# Patient Record
Sex: FEMALE | Race: WHITE | NOT HISPANIC OR LATINO | Employment: OTHER | URBAN - METROPOLITAN AREA
[De-identification: names, ages, dates, MRNs, and addresses within clinical notes are randomized per-mention and may not be internally consistent; named-entity substitution may affect disease eponyms.]

---

## 2017-08-07 ENCOUNTER — TELEPHONE (OUTPATIENT)
Dept: INTERNAL MEDICINE | Facility: CLINIC | Age: 77
End: 2017-08-07

## 2017-08-07 ENCOUNTER — OFFICE VISIT (OUTPATIENT)
Dept: INTERNAL MEDICINE | Facility: CLINIC | Age: 77
End: 2017-08-07
Payer: MEDICARE

## 2017-08-07 VITALS
OXYGEN SATURATION: 97 % | HEIGHT: 65 IN | DIASTOLIC BLOOD PRESSURE: 72 MMHG | BODY MASS INDEX: 22.66 KG/M2 | TEMPERATURE: 97 F | SYSTOLIC BLOOD PRESSURE: 118 MMHG | HEART RATE: 62 BPM | WEIGHT: 136 LBS

## 2017-08-07 DIAGNOSIS — B00.9 HSV-2 INFECTION: ICD-10-CM

## 2017-08-07 DIAGNOSIS — E78.5 HYPERLIPIDEMIA, UNSPECIFIED HYPERLIPIDEMIA TYPE: ICD-10-CM

## 2017-08-07 DIAGNOSIS — K21.9 GASTROESOPHAGEAL REFLUX DISEASE, ESOPHAGITIS PRESENCE NOT SPECIFIED: ICD-10-CM

## 2017-08-07 DIAGNOSIS — Z12.31 ENCOUNTER FOR SCREENING MAMMOGRAM FOR MALIGNANT NEOPLASM OF BREAST: ICD-10-CM

## 2017-08-07 DIAGNOSIS — N32.81 OVERACTIVE BLADDER: Primary | ICD-10-CM

## 2017-08-07 DIAGNOSIS — F41.9 ANXIETY: ICD-10-CM

## 2017-08-07 DIAGNOSIS — M85.80 OSTEOPENIA, UNSPECIFIED LOCATION: ICD-10-CM

## 2017-08-07 DIAGNOSIS — I10 ESSENTIAL HYPERTENSION: ICD-10-CM

## 2017-08-07 DIAGNOSIS — M89.9 BONE DISORDER: ICD-10-CM

## 2017-08-07 DIAGNOSIS — Z91.09 ENVIRONMENTAL ALLERGIES: ICD-10-CM

## 2017-08-07 PROBLEM — F32.A DEPRESSION: Status: ACTIVE | Noted: 2017-08-07

## 2017-08-07 PROCEDURE — 1126F AMNT PAIN NOTED NONE PRSNT: CPT | Mod: ,,, | Performed by: INTERNAL MEDICINE

## 2017-08-07 PROCEDURE — 99204 OFFICE O/P NEW MOD 45 MIN: CPT | Mod: S$PBB,,, | Performed by: INTERNAL MEDICINE

## 2017-08-07 PROCEDURE — 99203 OFFICE O/P NEW LOW 30 MIN: CPT | Mod: PBBFAC,PO | Performed by: INTERNAL MEDICINE

## 2017-08-07 PROCEDURE — 99999 PR PBB SHADOW E&M-NEW PATIENT-LVL III: CPT | Mod: PBBFAC,,, | Performed by: INTERNAL MEDICINE

## 2017-08-07 PROCEDURE — 3008F BODY MASS INDEX DOCD: CPT | Mod: ,,, | Performed by: INTERNAL MEDICINE

## 2017-08-07 PROCEDURE — 1159F MED LIST DOCD IN RCRD: CPT | Mod: ,,, | Performed by: INTERNAL MEDICINE

## 2017-08-07 RX ORDER — TRAZODONE HYDROCHLORIDE 50 MG/1
50 TABLET ORAL NIGHTLY PRN
Qty: 90 TABLET | Refills: 0 | Status: SHIPPED | OUTPATIENT
Start: 2017-08-07 | End: 2017-12-09 | Stop reason: SDUPTHER

## 2017-08-07 RX ORDER — RAMIPRIL 5 MG/1
5 CAPSULE ORAL DAILY
Qty: 90 CAPSULE | Refills: 1 | Status: SHIPPED | OUTPATIENT
Start: 2017-08-07 | End: 2018-04-18 | Stop reason: SDUPTHER

## 2017-08-07 RX ORDER — LORAZEPAM 1 MG/1
1 TABLET ORAL DAILY
COMMUNITY
Start: 2017-07-31 | End: 2017-09-01

## 2017-08-07 RX ORDER — ESCITALOPRAM OXALATE 20 MG/1
20 TABLET ORAL DAILY
Qty: 90 TABLET | Refills: 1 | Status: SHIPPED | OUTPATIENT
Start: 2017-08-07 | End: 2018-02-20 | Stop reason: SDUPTHER

## 2017-08-07 RX ORDER — OXYBUTYNIN CHLORIDE 5 MG/1
5 TABLET, EXTENDED RELEASE ORAL DAILY
COMMUNITY
Start: 2017-07-17 | End: 2017-08-07 | Stop reason: SDUPTHER

## 2017-08-07 RX ORDER — ESCITALOPRAM OXALATE 20 MG/1
1 TABLET ORAL DAILY
COMMUNITY
Start: 2017-07-21 | End: 2017-08-07 | Stop reason: SDUPTHER

## 2017-08-07 RX ORDER — ATORVASTATIN CALCIUM 10 MG/1
10 TABLET, FILM COATED ORAL DAILY
Qty: 90 TABLET | Refills: 1 | Status: SHIPPED | OUTPATIENT
Start: 2017-08-07 | End: 2018-02-08 | Stop reason: SDUPTHER

## 2017-08-07 RX ORDER — OXYBUTYNIN CHLORIDE 5 MG/1
5 TABLET, EXTENDED RELEASE ORAL DAILY
Qty: 90 TABLET | Refills: 1 | Status: SHIPPED | OUTPATIENT
Start: 2017-08-07 | End: 2018-04-03

## 2017-08-07 RX ORDER — PANTOPRAZOLE SODIUM 40 MG/1
1 TABLET, DELAYED RELEASE ORAL 2 TIMES DAILY
COMMUNITY
Start: 2017-07-23 | End: 2017-08-07 | Stop reason: SDUPTHER

## 2017-08-07 RX ORDER — FAMCICLOVIR 500 MG/1
1000 TABLET ORAL 2 TIMES DAILY
Qty: 8 TABLET | Refills: 2 | Status: SHIPPED | OUTPATIENT
Start: 2017-08-07 | End: 2018-01-05 | Stop reason: SDUPTHER

## 2017-08-07 RX ORDER — PANTOPRAZOLE SODIUM 40 MG/1
40 TABLET, DELAYED RELEASE ORAL 2 TIMES DAILY
Qty: 90 TABLET | Refills: 1 | Status: SHIPPED | OUTPATIENT
Start: 2017-08-07 | End: 2018-01-13 | Stop reason: SDUPTHER

## 2017-08-07 RX ORDER — RAMIPRIL 5 MG/1
1 CAPSULE ORAL DAILY
COMMUNITY
Start: 2017-07-17 | End: 2017-08-07 | Stop reason: SDUPTHER

## 2017-08-07 RX ORDER — EPINEPHRINE 0.3 MG/.3ML
INJECTION SUBCUTANEOUS
COMMUNITY
Start: 2016-09-01 | End: 2018-04-03

## 2017-08-07 RX ORDER — FAMCICLOVIR 250 MG/1
1 TABLET ORAL 2 TIMES DAILY
COMMUNITY
Start: 2016-09-26 | End: 2017-08-07 | Stop reason: SDUPTHER

## 2017-08-07 RX ORDER — BETAMETHASONE DIPROPIONATE 0.5 MG/G
CREAM TOPICAL
Refills: 6 | COMMUNITY
Start: 2017-05-04 | End: 2020-10-30

## 2017-08-07 RX ORDER — ATORVASTATIN CALCIUM 10 MG/1
10 TABLET, FILM COATED ORAL DAILY
COMMUNITY
Start: 2017-07-12 | End: 2017-08-07 | Stop reason: SDUPTHER

## 2017-08-07 NOTE — PROGRESS NOTES
Subjective:      Patient ID: Nerissa Burnham is a 77 y.o. female.    Chief Complaint: Establish Care    76 yo with Patient Active Problem List:     Hypertension     Hyperlipidemia     Depression     Osteopenia     HSV-2 infection     Anxiety     GERD (gastroesophageal reflux disease)    Here today for multiple medical problems as outlined below present for years.  She reports approximately 2 HSV outbreaks per year which resolved with Famvir.  She reports that she is compliant with her medications without significant side effects.  She ports that her anxiety worsened after her  became ill 6 or 7 years ago.  Lexapro is working well for her symptoms but she still takes Ativan nightly for sleep.    Past Surgical History:  No date: BREAST SURGERY      Comment: Augmentation  No date: HYSTERECTOMY    Social History    Marital status:              Spouse name:                       Years of education:                 Number of children:               Occupational History    None on file    Social History Main Topics    Smoking status: Never Smoker                                                                Smokeless tobacco: Never Used                        Alcohol use: Yes           8.4 oz/week       Glasses of wine: 14 per week    Drug use: No              Sexual activity: Not on file          Other Topics            Concern    None on file    Social History Narrative    None on file      family history includes Breast cancer in her mother; Cancer in her mother; No Known Problems in her father.    Immunization History    Administered Date(s) Administered    Influenza Split (PF) Quad Greater Than or Equal to 3 YO 10/07/2013, 10/31/2016    Influenza Split 6-35 MO OR > or = to 3 YO 10/11/2012    Pneumococcal Conjugate 01/05/2017    Pneumococcal Polysaccharide 11/17/2011    Tdap 07/08/2014             Hypertension   This is a chronic problem. The current episode started more than 1 year ago. The problem  has been gradually improving since onset. The problem is controlled. Associated symptoms include anxiety. Pertinent negatives include no blurred vision, chest pain, palpitations, peripheral edema or shortness of breath. There are no associated agents to hypertension. Risk factors for coronary artery disease include stress. Past treatments include ACE inhibitors. The current treatment provides significant improvement. There are no compliance problems.  There is no history of kidney disease, CAD/MI, PVD or a thyroid problem.   Hyperlipidemia   This is a chronic problem. The current episode started more than 1 year ago. The problem is controlled. Recent lipid tests were reviewed and are normal. She has no history of diabetes, hypothyroidism or obesity. There are no known factors aggravating her hyperlipidemia. Pertinent negatives include no chest pain or shortness of breath. Current antihyperlipidemic treatment includes statins. The current treatment provides significant improvement of lipids. There are no compliance problems.  Risk factors for coronary artery disease include hypertension and stress.     Review of Systems   Constitutional: Negative for chills and fever.   HENT: Negative for ear pain and sore throat.    Eyes: Negative for blurred vision.   Respiratory: Negative for cough, shortness of breath and wheezing.    Cardiovascular: Negative for chest pain and palpitations.   Gastrointestinal: Negative for abdominal pain and blood in stool.   Genitourinary: Negative for dysuria and hematuria.   Neurological: Negative for seizures and syncope.   Psychiatric/Behavioral: Negative for decreased concentration and hallucinations. The patient is not nervous/anxious.         Lipid panel (08/01/2017 8:15 AM)  Lipid panel (08/01/2017 8:15 AM)   Component Value Ref Range   Cholesterol, Total 181 140 - 200 mg/dL   Triglycerides 103 35 - 150 mg/dL   HDL 73 >50 mg/dL   LDL Calculated 87 60 - 135   Risk 2.48 0.00 - 4.45  "    Lipid panel (08/01/2017 8:15 AM)   Specimen Performing Laboratory   Ser[facility] - Vein [facility]    [address] [place] [address]     Back to top of Lab Results      Comprehensive metabolic panel (08/01/2017 8:15 AM)  Comprehensive metabolic panel (08/01/2017 8:15 AM)   Component Value Ref Range   Glucose 91 <100 mg/dL   BUN 23 (H) 6 - 22 mg/dL   Creatinine, Ser 0.93 0.50 - 1.20 mg/dL   Total Bilirubin 0.7 0.2 - 1.1 mg/dL   Alkaline Phosphatase 60 42 - 121 IU/L   AST 18 10 - 42 IU/L   ALT 11 10 - 60 IU/L   Calcium 10.2 8.4 - 10.5 mg/dL   Sodium 141 134 - 146 meq/L   Potassium 4.6 3.6 - 5.3 meq/L   Chloride 104.0 98.0 - 111.0 meq/dL   CO2 Total 29 20 - 34 meq/L   Total Protein 6.8 6.1 - 8.5 g/dL   Albumin, Ser 4.8 3.2 - 5.5 g/dL   eGFR Non- 59     eGFR  71     Note: INTERPRETATION:   THE eGFR VALUE SHOULD NOT BE USED IN PATIENTS WITH ACUTE RENAL FAILURE.                           STAGE ONE/TWO    >60 eGFR                           STAGE THREE    30-59 eGFR                           STAGE FOUR     15-29 eGFR                           STAGE FIVE       <15 eGFR .       Comprehensive metabolic panel (08/01/2017 8:15 AM)   Specimen Performing Laboratory   Serum - Vein [facility]    [address] [place] [id number]       Comprehensive metabolic panel (08/01/2017 8:15 AM)   Narrative   SLIGHT HEMOLYSIS    Glucose: Fasting Normal Range           Objective:   /72 (BP Location: Right arm, Patient Position: Sitting)   Pulse 62   Temp 97.2 °F (36.2 °C) (Tympanic)   Ht 5' 5" (1.651 m)   Wt 61.7 kg (136 lb 0.4 oz)   SpO2 97%   BMI 22.64 kg/m²     Physical Exam   Constitutional: She is oriented to person, place, and time. She appears well-developed and well-nourished. No distress.   HENT:   Head: Normocephalic and atraumatic.   Mouth/Throat: Oropharynx is clear and moist.   Eyes: EOM are normal. Pupils are equal, round, and reactive to " light.   Neck: Neck supple. Carotid bruit is not present. No thyromegaly present.   Cardiovascular: Normal rate and regular rhythm.    Pulmonary/Chest: Effort normal and breath sounds normal. She has no wheezes. She has no rales.   Abdominal: Soft. Bowel sounds are normal. There is no tenderness.   Lymphadenopathy:     She has no cervical adenopathy.   Neurological: She is alert and oriented to person, place, and time.   Skin: Skin is warm and dry.   Psychiatric: She has a normal mood and affect. Her behavior is normal.       Assessment:     1. Overactive bladder    2. Essential hypertension    3. Hyperlipidemia, unspecified hyperlipidemia type    4. Osteopenia, unspecified location    5. HSV-2 infection    6. Anxiety    7. Gastroesophageal reflux disease, esophagitis presence not specified    8. Environmental allergies    9. Bone disorder    10. Encounter for screening mammogram for malignant neoplasm of breast       Plan:   Overactive bladder  stable  -     oxybutynin (DITROPAN-XL) 5 MG TR24; Take 1 tablet (5 mg total) by mouth once daily.  Dispense: 90 tablet; Refill: 1    Essential hypertension  stable  -     ramipril (ALTACE) 5 MG capsule; Take 1 capsule (5 mg total) by mouth once daily.  Dispense: 90 capsule; Refill: 1    Hyperlipidemia, unspecified hyperlipidemia type  -     atorvastatin (LIPITOR) 10 MG tablet; Take 1 tablet (10 mg total) by mouth once daily.  Dispense: 90 tablet; Refill: 1    Osteopenia, unspecified location  Due for dexa    HSV-2 infection  famvir 1000 mg bid x 1 day prn outbreak  -     famciclovir (FAMVIR) 500 MG tablet; Take 2 tablets (1,000 mg total) by mouth 2 (two) times daily.  Dispense: 8 tablet; Refill: 2    Anxiety  Advised decrease dose of Ativan and try to decrease frequency to when necessary  I wrote down Rozerem and trazodone for patient to check with insurance and cost of these alternatives  -     escitalopram oxalate (LEXAPRO) 20 MG tablet; Take 1 tablet (20 mg total) by  mouth once daily.  Dispense: 90 tablet; Refill: 1    Gastroesophageal reflux disease, esophagitis presence not specified  stable  -     pantoprazole (PROTONIX) 40 MG tablet; Take 1 tablet (40 mg total) by mouth 2 (two) times daily.  Dispense: 90 tablet; Refill: 1    Environmental allergies  History of immunotherapy but has now discontinued    Bone disorder  -     DXA Bone Density Spine And Hip; Future; Expected date: 08/07/2017    Encounter for screening mammogram for malignant neoplasm of breast   -     Mammo Digital Screening Bilateral With CAD; Future; Expected date: 08/07/2017              Return in about 6 months (around 2/7/2018), or if symptoms worsen or fail to improve.

## 2017-08-07 NOTE — TELEPHONE ENCOUNTER
Notified pt that trazodone script has been sent to [facility].  Pt verbalized understanding.

## 2017-08-07 NOTE — TELEPHONE ENCOUNTER
----- Message from Alec Byrnes sent at 8/7/2017 12:06 PM CDT -----  Contact: [phone]   Pt would like to consult with the nurse about Rx medication.  Pt would like trazodone.  Please call back at [phone].  Thanks-[facility].      Pt Uses:  .  [facility]pharm[id number] - [place] - [address]  [address]  [address]  [place] [address]  Phone: [phone] Fax: [phone]

## 2017-08-07 NOTE — TELEPHONE ENCOUNTER
Received colonoscopy report of 05/15/2009 from Dr. Demetris Medrano at [facility].    IMPRESSION:  1.  Mild diverticulosis in the sigmoid colon.  2.  Hemorrhoids, medium internal.  3.  Normal colon otherwise.    RECOMMENDATIONS:  1.  Fiber rich diet.  2.  Repeat colonoscopy in 5 years.

## 2017-08-24 ENCOUNTER — HOSPITAL ENCOUNTER (OUTPATIENT)
Dept: RADIOLOGY | Facility: HOSPITAL | Age: 77
Discharge: HOME | End: 2017-08-24
Attending: INTERNAL MEDICINE
Payer: MEDICARE

## 2017-08-24 DIAGNOSIS — Z12.31 ENCOUNTER FOR SCREENING MAMMOGRAM FOR MALIGNANT NEOPLASM OF BREAST: ICD-10-CM

## 2017-08-24 PROCEDURE — 77063 BREAST TOMOSYNTHESIS BI: CPT | Mod: 26,,, | Performed by: RADIOLOGY

## 2017-08-24 PROCEDURE — 77067 SCR MAMMO BI INCL CAD: CPT | Mod: 26,,, | Performed by: RADIOLOGY

## 2017-08-24 PROCEDURE — 77067 SCR MAMMO BI INCL CAD: CPT | Mod: TC

## 2017-09-01 ENCOUNTER — TELEPHONE (OUTPATIENT)
Dept: INTERNAL MEDICINE | Facility: CLINIC | Age: 77
End: 2017-09-01

## 2017-09-01 ENCOUNTER — OFFICE VISIT (OUTPATIENT)
Dept: INTERNAL MEDICINE | Facility: CLINIC | Age: 77
End: 2017-09-01
Payer: MEDICARE

## 2017-09-01 VITALS
SYSTOLIC BLOOD PRESSURE: 104 MMHG | HEIGHT: 65 IN | BODY MASS INDEX: 22.7 KG/M2 | OXYGEN SATURATION: 96 % | WEIGHT: 136.25 LBS | DIASTOLIC BLOOD PRESSURE: 62 MMHG | HEART RATE: 68 BPM | TEMPERATURE: 97 F

## 2017-09-01 DIAGNOSIS — M85.80 OSTEOPENIA, UNSPECIFIED LOCATION: Primary | ICD-10-CM

## 2017-09-01 PROCEDURE — 99214 OFFICE O/P EST MOD 30 MIN: CPT | Mod: PBBFAC,PO | Performed by: INTERNAL MEDICINE

## 2017-09-01 PROCEDURE — 99999 PR PBB SHADOW E&M-EST. PATIENT-LVL IV: CPT | Mod: PBBFAC,,, | Performed by: INTERNAL MEDICINE

## 2017-09-01 PROCEDURE — 99213 OFFICE O/P EST LOW 20 MIN: CPT | Mod: S$PBB,,, | Performed by: INTERNAL MEDICINE

## 2017-09-01 PROCEDURE — 1159F MED LIST DOCD IN RCRD: CPT | Mod: ,,, | Performed by: INTERNAL MEDICINE

## 2017-09-01 PROCEDURE — 1126F AMNT PAIN NOTED NONE PRSNT: CPT | Mod: ,,, | Performed by: INTERNAL MEDICINE

## 2017-09-01 RX ORDER — ALENDRONATE SODIUM 70 MG/1
70 TABLET ORAL
Qty: 12 TABLET | Refills: 3 | Status: SHIPPED | OUTPATIENT
Start: 2017-09-01 | End: 2018-12-03 | Stop reason: SDUPTHER

## 2017-09-01 NOTE — TELEPHONE ENCOUNTER
Spoke with pt's  and notified him of Dr. Gudino's instructions for pt to get 5351-5825 mg of calcium thru diet, and if not able to get enough calcium thru diet, to take 500-600 mg of calcium supplement and to check vit d level at her convenience.  Pt's  verbalized understanding and stated he will convey message to pt.

## 2017-09-01 NOTE — PROGRESS NOTES
"Subjective:      Patient ID: Nerissa Burnham is a 77 y.o. female.    Chief Complaint: Follow-up (discuss DEXA scan)    78 yo with Patient Active Problem List:     Hypertension     Hyperlipidemia     Depression     HSV-2 infection     Anxiety     GERD (gastroesophageal reflux disease)    Here today for men of new diagnosis of osteopenia with high fracture risk.  Patient reports her mother had osteoporosis.  The patient has had no treatment for this in the past.  She is not planning to have any dental work done in the near future.      Review of Systems   Constitutional: Negative for chills and fever.   Respiratory: Negative for cough.    Cardiovascular: Negative for chest pain.   Gastrointestinal: Negative for abdominal pain.     Objective:   /62 (BP Location: Right arm, Patient Position: Sitting)   Pulse 68   Temp 97.4 °F (36.3 °C) (Tympanic)   Ht 5' 5" (1.651 m)   Wt 61.8 kg (136 lb 3.9 oz)   SpO2 96%   BMI 22.67 kg/m²     Physical Exam   Constitutional: She appears well-developed and well-nourished. No distress.   Cardiovascular: Normal rate.    Pulmonary/Chest: Effort normal.   Neurological: She is alert.   Skin: Skin is warm and dry.   Psychiatric: She has a normal mood and affect. Her behavior is normal.       Assessment:     1. Osteopenia, unspecified location      Plan:   Osteopenia, unspecified location  -     alendronate (FOSAMAX) 70 MG tablet; Take 1 tablet (70 mg total) by mouth every 7 days.  Dispense: 12 tablet; Refill: 3  -     Vitamin D; Future; Expected date: 09/01/2017     6517-8328 mg of calcium daily. Preferably through diet. If use a supplement, take calcium citrate 500-600 mg per dose.       Taking bisphosphonates pills  Always read medicine information closely. Certain bisphosphonates must be taken:  · On an empty stomach.  · With a full glass of water (8 oz) first thing in the morning.  · At least 30 minutes to one hour before any food, drink, or other medications.  · While sitting " or standing. You should not lie down for at least 30 minutes after taking the medicine.    Problem List Items Addressed This Visit        Orthopedic    Osteopenia - Primary    Relevant Medications    alendronate (FOSAMAX) 70 MG tablet    Other Relevant Orders    Vitamin D      Other Visit Diagnoses    None.         Return if symptoms worsen or fail to improve.

## 2017-09-01 NOTE — PATIENT INSTRUCTIONS
Taking bisphosphonates pills  Always read medicine information closely. Certain bisphosphonates must be taken:  · On an empty stomach.  · With a full glass of water (8 oz) first thing in the morning.  · At least 30 minutes to one hour before any food, drink, or other medications.  · While sitting or standing. You should not lie down for at least 30 minutes after taking the medicine.  Newer medicines can be taken weekly or monthly. Talk with your doctor to find out which one is right for you.   Osteoporosis Medications: Bisphosphonates  Depending on your needs, your healthcare provider may prescribe medicines to prevent or treat osteoporosis.    Bisphosphonates  Several medicines make up the class of drugs known as bisphosphonates. Bisphosphonates are the most common type of medicine used to help prevent and treat bone loss. Bisphosphonates are given in pill or injectable form as an IV infusion. They must be taken exactly as directed. Benefits may include:  · Reducing bone loss  · Increasing bone density in the hip and spine  · Reducing risk of fractures in the spine, hip, and wrist  Side effects may include:  · Heartburn  · Nausea  · Abdominal pain  · Bone or muscle pain  Taking bisphosphonates pills  Always read medicine information closely. Certain bisphosphonates must be taken:  · On an empty stomach.  · With a full glass of water (8 oz) first thing in the morning.  · At least 30 minutes to one hour before any food, drink, or other medications.  · While sitting or standing. You should not lie down for at least 30 minutes after taking the medicine.  Newer medicines can be taken weekly or monthly. Talk with your doctor to find out which one is right for you.   Date Last Reviewed: 10/11/2015  © 7686-4253 [employer]. [address] [place] [address], [place] [address]. All rights reserved. This information is not intended as a substitute for professional medical care. Always follow your healthcare professional's  instructions.

## 2017-12-10 RX ORDER — TRAZODONE HYDROCHLORIDE 50 MG/1
TABLET ORAL
Qty: 90 TABLET | Refills: 0 | Status: SHIPPED | OUTPATIENT
Start: 2017-12-10 | End: 2018-03-09 | Stop reason: SDUPTHER

## 2018-01-05 DIAGNOSIS — B00.9 HSV-2 INFECTION: ICD-10-CM

## 2018-01-05 RX ORDER — FAMCICLOVIR 500 MG/1
1000 TABLET ORAL 2 TIMES DAILY
Qty: 8 TABLET | Refills: 2 | Status: SHIPPED | OUTPATIENT
Start: 2018-01-05 | End: 2018-05-14 | Stop reason: SDUPTHER

## 2018-01-13 DIAGNOSIS — K21.9 GASTROESOPHAGEAL REFLUX DISEASE, ESOPHAGITIS PRESENCE NOT SPECIFIED: ICD-10-CM

## 2018-01-13 RX ORDER — PANTOPRAZOLE SODIUM 40 MG/1
TABLET, DELAYED RELEASE ORAL
Qty: 90 TABLET | Refills: 1 | Status: SHIPPED | OUTPATIENT
Start: 2018-01-13 | End: 2018-04-18 | Stop reason: SDUPTHER

## 2018-02-08 DIAGNOSIS — E78.5 HYPERLIPIDEMIA, UNSPECIFIED HYPERLIPIDEMIA TYPE: ICD-10-CM

## 2018-02-09 RX ORDER — ATORVASTATIN CALCIUM 10 MG/1
TABLET, FILM COATED ORAL
Qty: 90 TABLET | Refills: 0 | Status: SHIPPED | OUTPATIENT
Start: 2018-02-09 | End: 2018-04-18 | Stop reason: SDUPTHER

## 2018-02-20 DIAGNOSIS — F41.9 ANXIETY: ICD-10-CM

## 2018-02-20 RX ORDER — ESCITALOPRAM OXALATE 20 MG/1
TABLET ORAL
Qty: 90 TABLET | Refills: 1 | Status: SHIPPED | OUTPATIENT
Start: 2018-02-20 | End: 2018-04-10 | Stop reason: SDUPTHER

## 2018-03-09 RX ORDER — TRAZODONE HYDROCHLORIDE 50 MG/1
TABLET ORAL
Qty: 90 TABLET | Refills: 0 | Status: SHIPPED | OUTPATIENT
Start: 2018-03-09 | End: 2018-04-18 | Stop reason: SDUPTHER

## 2018-04-02 ENCOUNTER — TELEPHONE (OUTPATIENT)
Dept: INTERNAL MEDICINE | Facility: CLINIC | Age: 78
End: 2018-04-02

## 2018-04-02 NOTE — TELEPHONE ENCOUNTER
----- Message from Cassidy Mcallister sent at 4/2/2018  8:15 AM CDT -----  Patient requesting orders for a urine specimen. State she may have a bladder infection. Please adv/call [phone].//thanks. cw

## 2018-04-03 ENCOUNTER — OFFICE VISIT (OUTPATIENT)
Dept: INTERNAL MEDICINE | Facility: CLINIC | Age: 78
End: 2018-04-03
Payer: MEDICARE

## 2018-04-03 ENCOUNTER — LAB VISIT (OUTPATIENT)
Dept: LAB | Facility: HOSPITAL | Age: 78
End: 2018-04-03
Attending: INTERNAL MEDICINE
Payer: MEDICARE

## 2018-04-03 VITALS
OXYGEN SATURATION: 98 % | TEMPERATURE: 97 F | DIASTOLIC BLOOD PRESSURE: 70 MMHG | HEART RATE: 61 BPM | BODY MASS INDEX: 21.33 KG/M2 | HEIGHT: 65 IN | WEIGHT: 128 LBS | SYSTOLIC BLOOD PRESSURE: 112 MMHG

## 2018-04-03 DIAGNOSIS — R39.89 SENSATION OF PRESSURE IN BLADDER AREA: Primary | ICD-10-CM

## 2018-04-03 DIAGNOSIS — R39.89 SENSATION OF PRESSURE IN BLADDER AREA: ICD-10-CM

## 2018-04-03 LAB
BILIRUB UR QL STRIP: NEGATIVE
CLARITY UR: CLEAR
COLOR UR: YELLOW
GLUCOSE UR QL STRIP: NEGATIVE
HGB UR QL STRIP: NEGATIVE
KETONES UR QL STRIP: NEGATIVE
LEUKOCYTE ESTERASE UR QL STRIP: ABNORMAL
MICROSCOPIC COMMENT: NORMAL
NITRITE UR QL STRIP: NEGATIVE
PH UR STRIP: 7 [PH] (ref 5–8)
PROT UR QL STRIP: NEGATIVE
RBC #/AREA URNS HPF: 1 /HPF (ref 0–4)
SP GR UR STRIP: 1.01 (ref 1–1.03)
SQUAMOUS #/AREA URNS HPF: 3 /HPF
URN SPEC COLLECT METH UR: ABNORMAL
WBC #/AREA URNS HPF: 4 /HPF (ref 0–5)

## 2018-04-03 PROCEDURE — 99999 PR PBB SHADOW E&M-EST. PATIENT-LVL III: CPT | Mod: PBBFAC,,, | Performed by: INTERNAL MEDICINE

## 2018-04-03 PROCEDURE — 99213 OFFICE O/P EST LOW 20 MIN: CPT | Mod: PBBFAC,PO | Performed by: INTERNAL MEDICINE

## 2018-04-03 PROCEDURE — 81000 URINALYSIS NONAUTO W/SCOPE: CPT | Mod: PO

## 2018-04-03 PROCEDURE — 99213 OFFICE O/P EST LOW 20 MIN: CPT | Mod: S$PBB,,, | Performed by: INTERNAL MEDICINE

## 2018-04-03 RX ORDER — FAMCICLOVIR 500 MG/1
TABLET ORAL
Refills: 2 | COMMUNITY
Start: 2018-03-18 | End: 2018-04-03 | Stop reason: ALTCHOICE

## 2018-04-03 RX ORDER — CEPHALEXIN 500 MG/1
CAPSULE ORAL
Refills: 0 | COMMUNITY
Start: 2018-03-19 | End: 2018-04-03 | Stop reason: ALTCHOICE

## 2018-04-04 ENCOUNTER — TELEPHONE (OUTPATIENT)
Dept: INTERNAL MEDICINE | Facility: CLINIC | Age: 78
End: 2018-04-04

## 2018-04-04 NOTE — TELEPHONE ENCOUNTER
----- Message from Lula Banda sent at 4/4/2018  1:29 PM CDT -----  Contact: Pt  Please give pt a call at [phone] regarding the results of her test.

## 2018-04-05 NOTE — PROGRESS NOTES
"Subjective:      Patient ID: Nerissa Burnham is a 78 y.o. female.    Chief Complaint: Follow-up    77 yo with Patient Active Problem List:     Hypertension     Hyperlipidemia     Depression     Osteopenia     HSV-2 infection     Anxiety     GERD (gastroesophageal reflux disease)    Here today c/o nocturia, bladder pressure x 3 days. No burning, fever, flank pain. There is mild frequency.       Review of Systems   Constitutional: Negative for chills and fever.   Respiratory: Negative for cough.    Cardiovascular: Negative for chest pain.   Gastrointestinal: Negative for abdominal pain.     Objective:   /70 (BP Location: Right arm, Patient Position: Sitting)   Pulse 61   Temp 97.2 °F (36.2 °C) (Tympanic)   Ht 5' 5" (1.651 m)   Wt 58 kg (127 lb 15.6 oz)   SpO2 98%   BMI 21.30 kg/m²     Physical Exam   Constitutional: She appears well-developed and well-nourished. No distress.   Cardiovascular: Normal rate.    Pulmonary/Chest: Effort normal.   Abdominal: Soft. Bowel sounds are normal. There is no tenderness.   Musculoskeletal: She exhibits no edema.   Neurological: She is alert.   Skin: Skin is warm and dry.   Psychiatric: She has a normal mood and affect. Her behavior is normal.       Assessment:     1. Sensation of pressure in bladder area      Plan:   Sensation of pressure in bladder area  -     Urinalysis; Future; Expected date: 04/03/2018        Lab Frequency Next Occurrence   Vitamin D Once 09/01/2017       Problem List Items Addressed This Visit     None      Visit Diagnoses     Sensation of pressure in bladder area    -  Primary    Relevant Orders    Urinalysis (Completed)          Follow-up in about 4 weeks (around 5/1/2018), or if symptoms worsen or fail to improve.  "

## 2018-04-10 DIAGNOSIS — F41.9 ANXIETY: ICD-10-CM

## 2018-04-10 RX ORDER — ESCITALOPRAM OXALATE 20 MG/1
20 TABLET ORAL DAILY
Qty: 90 TABLET | Refills: 0 | Status: SHIPPED | OUTPATIENT
Start: 2018-04-10 | End: 2018-04-18 | Stop reason: SDUPTHER

## 2018-04-10 NOTE — TELEPHONE ENCOUNTER
Patient states that she has been doubling her dose of Lexapro for a month since her  passed away. She only has 1 week left. Informed pt that prescription was sent in on 2/20/18 for a 90 day supply and 1 refill. Patient stated the pharmacy told her there are no refills on file.

## 2018-04-10 NOTE — TELEPHONE ENCOUNTER
----- Message from Damari MERA Romeo sent at 4/10/2018  8:31 AM CDT -----  Contact: pt   States she's calling to get a refill and can be reached at [phone]//thanks/dbjosh     1. What is the name of the medication you are requesting? lexapro  2. What is the dose? 20mg   3. How do you take the medication? Orally, topically, etc? Orally   4. How often do you take this medication? Once   5. Do you need a 30 day or 90 day supply? 30 days  6. How many refills are you requesting?   7. What is your preferred pharmacy and location of the pharmacy?   8. Who can we contact with further questions? Pt    [id number] - [place] - [address]  [address] [facility].  [address]  [place] [address]  Phone: [phone] Fax: [phone]

## 2018-04-18 ENCOUNTER — OFFICE VISIT (OUTPATIENT)
Dept: INTERNAL MEDICINE | Facility: CLINIC | Age: 78
End: 2018-04-18
Payer: MEDICARE

## 2018-04-18 VITALS
WEIGHT: 127.44 LBS | SYSTOLIC BLOOD PRESSURE: 142 MMHG | BODY MASS INDEX: 21.23 KG/M2 | DIASTOLIC BLOOD PRESSURE: 76 MMHG | OXYGEN SATURATION: 98 % | TEMPERATURE: 98 F | HEART RATE: 68 BPM | HEIGHT: 65 IN

## 2018-04-18 DIAGNOSIS — M81.0 OSTEOPOROSIS, UNSPECIFIED OSTEOPOROSIS TYPE, UNSPECIFIED PATHOLOGICAL FRACTURE PRESENCE: ICD-10-CM

## 2018-04-18 DIAGNOSIS — F32.A DEPRESSION, UNSPECIFIED DEPRESSION TYPE: ICD-10-CM

## 2018-04-18 DIAGNOSIS — F41.9 ANXIETY: ICD-10-CM

## 2018-04-18 DIAGNOSIS — Z23 NEED FOR PNEUMOCOCCAL VACCINATION: ICD-10-CM

## 2018-04-18 DIAGNOSIS — K21.9 GASTROESOPHAGEAL REFLUX DISEASE, ESOPHAGITIS PRESENCE NOT SPECIFIED: ICD-10-CM

## 2018-04-18 DIAGNOSIS — M85.80 OSTEOPENIA, UNSPECIFIED LOCATION: ICD-10-CM

## 2018-04-18 DIAGNOSIS — E78.5 HYPERLIPIDEMIA, UNSPECIFIED HYPERLIPIDEMIA TYPE: ICD-10-CM

## 2018-04-18 DIAGNOSIS — I10 ESSENTIAL HYPERTENSION: Primary | ICD-10-CM

## 2018-04-18 PROCEDURE — 99213 OFFICE O/P EST LOW 20 MIN: CPT | Mod: PBBFAC,PO,25 | Performed by: INTERNAL MEDICINE

## 2018-04-18 PROCEDURE — G0009 ADMIN PNEUMOCOCCAL VACCINE: HCPCS | Mod: PBBFAC,PO

## 2018-04-18 PROCEDURE — 99214 OFFICE O/P EST MOD 30 MIN: CPT | Mod: S$PBB,,, | Performed by: INTERNAL MEDICINE

## 2018-04-18 PROCEDURE — 99999 PR PBB SHADOW E&M-EST. PATIENT-LVL III: CPT | Mod: PBBFAC,,, | Performed by: INTERNAL MEDICINE

## 2018-04-18 RX ORDER — RAMIPRIL 5 MG/1
5 CAPSULE ORAL DAILY
Qty: 90 CAPSULE | Refills: 1 | Status: SHIPPED | OUTPATIENT
Start: 2018-04-18 | End: 2018-11-15 | Stop reason: ALTCHOICE

## 2018-04-18 RX ORDER — ATORVASTATIN CALCIUM 10 MG/1
10 TABLET, FILM COATED ORAL DAILY
Qty: 90 TABLET | Refills: 1 | Status: SHIPPED | OUTPATIENT
Start: 2018-04-18 | End: 2018-11-08 | Stop reason: SDUPTHER

## 2018-04-18 RX ORDER — PANTOPRAZOLE SODIUM 40 MG/1
40 TABLET, DELAYED RELEASE ORAL 2 TIMES DAILY
Qty: 90 TABLET | Refills: 1 | Status: SHIPPED | OUTPATIENT
Start: 2018-04-18 | End: 2018-07-24

## 2018-04-18 RX ORDER — TRAZODONE HYDROCHLORIDE 50 MG/1
50 TABLET ORAL NIGHTLY
Qty: 90 TABLET | Refills: 1 | Status: SHIPPED | OUTPATIENT
Start: 2018-04-18 | End: 2019-02-13 | Stop reason: SDUPTHER

## 2018-04-18 RX ORDER — ESCITALOPRAM OXALATE 20 MG/1
20 TABLET ORAL DAILY
Qty: 90 TABLET | Refills: 1 | Status: SHIPPED | OUTPATIENT
Start: 2018-04-18 | End: 2019-04-18 | Stop reason: SDUPTHER

## 2018-04-18 RX ORDER — VIT C/E/ZN/COPPR/LUTEIN/ZEAXAN 250MG-90MG
1000 CAPSULE ORAL DAILY
COMMUNITY
End: 2023-04-17

## 2018-04-19 ENCOUNTER — PATIENT OUTREACH (OUTPATIENT)
Dept: ADMINISTRATIVE | Facility: HOSPITAL | Age: 78
End: 2018-04-19

## 2018-04-19 NOTE — PROGRESS NOTES
Subjective:      Patient ID: Nerissa Burnham is a 78 y.o. female.    Chief Complaint: Follow-up    77 yo with Patient Active Problem List:     Hypertension     Hyperlipidemia     Depression     Osteopenia     HSV-2 infection     Anxiety     GERD (gastroesophageal reflux disease)    Here today for management of mult med problems as outlined below.  Compliant with meds without significant side effects. Feeling well without new c/o. Non smoker.     Social History    Marital status:              Spouse name:                       Years of education:                 Number of children:               Occupational History    None on file    Social History Main Topics    Smoking status: Never Smoker                                                                Smokeless tobacco: Never Used                        Alcohol use: Yes           8.4 oz/week       Glasses of wine: 14 per week    Drug use: No              Sexual activity: Not on file          Other Topics            Concern    None on file    Social History Narrative    None on file            Review of Systems   Constitutional: Negative for activity change and unexpected weight change.   HENT: Negative for hearing loss, rhinorrhea and trouble swallowing.    Eyes: Negative for discharge and visual disturbance.   Respiratory: Negative for chest tightness and wheezing.    Cardiovascular: Negative for chest pain.   Gastrointestinal: Negative for blood in stool, constipation, diarrhea and vomiting.   Endocrine: Negative for polydipsia and polyuria.   Genitourinary: Negative for difficulty urinating, dysuria, hematuria and menstrual problem.   Musculoskeletal: Negative for arthralgias, joint swelling and neck pain.   Skin: Negative for rash and wound.   Neurological: Negative for weakness and headaches.   Psychiatric/Behavioral: Negative for confusion and dysphoric mood.     Objective:   BP (!) 142/76 (BP Location: Right arm, Patient Position: Sitting)   Pulse 68   " Temp 97.7 °F (36.5 °C) (Tympanic)   Ht 5' 5" (1.651 m)   Wt 57.8 kg (127 lb 6.8 oz)   SpO2 98%   BMI 21.20 kg/m²     Physical Exam   Constitutional: She is oriented to person, place, and time. She appears well-developed and well-nourished. No distress.   HENT:   Head: Normocephalic and atraumatic.   Mouth/Throat: Oropharynx is clear and moist.   Eyes: EOM are normal. Pupils are equal, round, and reactive to light.   Neck: Neck supple. No thyromegaly present.   Cardiovascular: Normal rate and regular rhythm.    Pulmonary/Chest: Breath sounds normal. She has no wheezes. She has no rales.   Abdominal: Soft. Bowel sounds are normal. There is no tenderness.   Musculoskeletal: She exhibits no edema.   Lymphadenopathy:     She has no cervical adenopathy.   Neurological: She is alert and oriented to person, place, and time.   Skin: Skin is warm and dry.   Psychiatric: She has a normal mood and affect. Her behavior is normal.       Assessment:     1. Essential hypertension    2. Need for pneumococcal vaccination    3. Hyperlipidemia, unspecified hyperlipidemia type    4. Osteopenia, unspecified location    5. Gastroesophageal reflux disease, esophagitis presence not specified    6. Depression, unspecified depression type    7. Anxiety    8. Osteoporosis, unspecified osteoporosis type, unspecified pathological fracture presence      Plan:   Essential hypertension  Average bp at goal  -     ramipril (ALTACE) 5 MG capsule; Take 1 capsule (5 mg total) by mouth once daily.  Dispense: 90 capsule; Refill: 1    Need for pneumococcal vaccination  -     (In Office Administered) Pneumococcal Conjugate Vaccine (13 Valent) (IM)    Hyperlipidemia, unspecified hyperlipidemia type  stable  -     atorvastatin (LIPITOR) 10 MG tablet; Take 1 tablet (10 mg total) by mouth once daily.  Dispense: 90 tablet; Refill: 1  -     Lipid panel; Future; Expected date: 10/15/2018  -     TSH; Future; Expected date: 10/15/2018  -     CBC auto " differential; Future; Expected date: 10/15/2018  -     Comprehensive metabolic panel; Future; Expected date: 10/15/2018    Osteopenia, unspecified location  Cont fosamax    Gastroesophageal reflux disease, esophagitis presence not specified  Discussed trying drug holiday  -     pantoprazole (PROTONIX) 40 MG tablet; Take 1 tablet (40 mg total) by mouth 2 (two) times daily.  Dispense: 90 tablet; Refill: 1    Depression, unspecified depression type  stable  -     escitalopram oxalate (LEXAPRO) 20 MG tablet; Take 1 tablet (20 mg total) by mouth once daily.  Dispense: 90 tablet; Refill: 1    Anxiety  stable  -     escitalopram oxalate (LEXAPRO) 20 MG tablet; Take 1 tablet (20 mg total) by mouth once daily.  Dispense: 90 tablet; Refill: 1  -     traZODone (DESYREL) 50 MG tablet; Take 1 tablet (50 mg total) by mouth nightly.  Dispense: 90 tablet; Refill: 1    Osteoporosis, unspecified osteoporosis type, unspecified pathological fracture presence  -     Vitamin D; Future; Expected date: 10/15/2018        Lab Frequency Next Occurrence   Lipid panel Once 10/15/2018   TSH Once 10/15/2018   CBC auto differential Once 10/15/2018   Comprehensive metabolic panel Once 10/15/2018   Vitamin D Once 10/15/2018       Problem List Items Addressed This Visit        Psychiatric    Depression    Relevant Medications    escitalopram oxalate (LEXAPRO) 20 MG tablet    Anxiety    Relevant Medications    escitalopram oxalate (LEXAPRO) 20 MG tablet    traZODone (DESYREL) 50 MG tablet       Cardiac/Vascular    Hypertension - Primary    Relevant Medications    ramipril (ALTACE) 5 MG capsule    Hyperlipidemia    Relevant Medications    atorvastatin (LIPITOR) 10 MG tablet    Other Relevant Orders    Lipid panel    TSH    CBC auto differential    Comprehensive metabolic panel       GI    GERD (gastroesophageal reflux disease)    Relevant Medications    pantoprazole (PROTONIX) 40 MG tablet       Orthopedic    Osteopenia      Other Visit Diagnoses      Need for pneumococcal vaccination        Relevant Orders    (In Office Administered) Pneumococcal Conjugate Vaccine (13 Valent) (IM) (Completed)    Osteoporosis, unspecified osteoporosis type, unspecified pathological fracture presence        Relevant Orders    Vitamin D          Follow-up in about 6 months (around 10/18/2018), or if symptoms worsen or fail to improve.

## 2018-05-14 DIAGNOSIS — B00.9 HSV-2 INFECTION: ICD-10-CM

## 2018-05-14 RX ORDER — FAMCICLOVIR 500 MG/1
1000 TABLET ORAL 2 TIMES DAILY
Qty: 8 TABLET | Refills: 2 | Status: SHIPPED | OUTPATIENT
Start: 2018-05-14 | End: 2018-08-30 | Stop reason: SDUPTHER

## 2018-06-09 RX ORDER — TRAZODONE HYDROCHLORIDE 50 MG/1
TABLET ORAL
Qty: 90 TABLET | Refills: 0 | Status: SHIPPED | OUTPATIENT
Start: 2018-06-09 | End: 2018-07-24 | Stop reason: SDUPTHER

## 2018-07-24 ENCOUNTER — HOSPITAL ENCOUNTER (OUTPATIENT)
Dept: RADIOLOGY | Facility: HOSPITAL | Age: 78
Discharge: HOME | End: 2018-07-24
Attending: INTERNAL MEDICINE
Payer: MEDICARE

## 2018-07-24 ENCOUNTER — OFFICE VISIT (OUTPATIENT)
Dept: INTERNAL MEDICINE | Facility: CLINIC | Age: 78
End: 2018-07-24
Payer: MEDICARE

## 2018-07-24 ENCOUNTER — OFFICE VISIT (OUTPATIENT)
Dept: CARDIOLOGY | Facility: CLINIC | Age: 78
End: 2018-07-24
Payer: MEDICARE

## 2018-07-24 VITALS
BODY MASS INDEX: 22 KG/M2 | SYSTOLIC BLOOD PRESSURE: 118 MMHG | HEART RATE: 66 BPM | HEIGHT: 65 IN | TEMPERATURE: 97 F | OXYGEN SATURATION: 97 % | DIASTOLIC BLOOD PRESSURE: 74 MMHG | WEIGHT: 132.06 LBS

## 2018-07-24 VITALS
HEIGHT: 65 IN | HEART RATE: 61 BPM | WEIGHT: 131.63 LBS | DIASTOLIC BLOOD PRESSURE: 68 MMHG | BODY MASS INDEX: 21.93 KG/M2 | SYSTOLIC BLOOD PRESSURE: 150 MMHG

## 2018-07-24 DIAGNOSIS — Z00.00 PREVENTATIVE HEALTH CARE: ICD-10-CM

## 2018-07-24 DIAGNOSIS — R00.2 PALPITATION: Primary | ICD-10-CM

## 2018-07-24 DIAGNOSIS — I10 ESSENTIAL HYPERTENSION: ICD-10-CM

## 2018-07-24 DIAGNOSIS — R19.7 DIARRHEA, UNSPECIFIED TYPE: Primary | ICD-10-CM

## 2018-07-24 DIAGNOSIS — R00.2 PALPITATIONS: ICD-10-CM

## 2018-07-24 DIAGNOSIS — Z71.84 TRAVEL ADVICE ENCOUNTER: ICD-10-CM

## 2018-07-24 DIAGNOSIS — E78.5 HYPERLIPIDEMIA, UNSPECIFIED HYPERLIPIDEMIA TYPE: ICD-10-CM

## 2018-07-24 PROCEDURE — 93005 ELECTROCARDIOGRAM TRACING: CPT | Mod: PBBFAC,PO | Performed by: INTERNAL MEDICINE

## 2018-07-24 PROCEDURE — 90471 IMMUNIZATION ADMIN: CPT | Mod: PBBFAC,PO

## 2018-07-24 PROCEDURE — 99204 OFFICE O/P NEW MOD 45 MIN: CPT | Mod: S$PBB,,, | Performed by: INTERNAL MEDICINE

## 2018-07-24 PROCEDURE — 99999 PR PBB SHADOW E&M-EST. PATIENT-LVL III: CPT | Mod: PBBFAC,,, | Performed by: INTERNAL MEDICINE

## 2018-07-24 PROCEDURE — 99214 OFFICE O/P EST MOD 30 MIN: CPT | Mod: S$PBB,,, | Performed by: INTERNAL MEDICINE

## 2018-07-24 PROCEDURE — 99999 PR PBB SHADOW E&M-EST. PATIENT-LVL IV: CPT | Mod: PBBFAC,,, | Performed by: INTERNAL MEDICINE

## 2018-07-24 PROCEDURE — 71046 X-RAY EXAM CHEST 2 VIEWS: CPT | Mod: TC,FY,PO

## 2018-07-24 PROCEDURE — 93010 ELECTROCARDIOGRAM REPORT: CPT | Mod: S$PBB,,, | Performed by: INTERNAL MEDICINE

## 2018-07-24 PROCEDURE — 99213 OFFICE O/P EST LOW 20 MIN: CPT | Mod: PBBFAC,25,PO | Performed by: INTERNAL MEDICINE

## 2018-07-24 PROCEDURE — 99214 OFFICE O/P EST MOD 30 MIN: CPT | Mod: PBBFAC,25,27,PO | Performed by: INTERNAL MEDICINE

## 2018-07-24 PROCEDURE — 71046 X-RAY EXAM CHEST 2 VIEWS: CPT | Mod: 26,,, | Performed by: RADIOLOGY

## 2018-07-24 RX ORDER — FAMCICLOVIR 500 MG/1
2 TABLET ORAL
Refills: 2 | COMMUNITY
Start: 2018-07-14 | End: 2019-05-14

## 2018-07-24 NOTE — PROGRESS NOTES
Subjective:   Patient ID:  Nerissa Burnham is a 78 y.o. female who presents for evaluation of Establish Care and Palpitations      79 yo female referred for palpitation.  PMH HTN, HLD and depression.  No smoking.  Two drinking daily.   States that having palpitation if drinking fast.  Now became more frequent.  No chest pain, dyspnea, dizziness, orthopnea.   passed away in . Since then, the palpitation seems worse.         Past Medical History:   Diagnosis Date    Depression     Hyperlipidemia     Hypertension        Past Surgical History:   Procedure Laterality Date    BREAST SURGERY Bilateral     Augmentation    HYSTERECTOMY      OOPHORECTOMY         Social History   Substance Use Topics    Smoking status: Never Smoker    Smokeless tobacco: Never Used    Alcohol use 8.4 oz/week     14 Glasses of wine per week       Family History   Problem Relation Age of Onset    Cancer Mother         Breast    Breast cancer Mother     No Known Problems Father        Review of Systems   Constitution: Negative for decreased appetite, diaphoresis, fever, weakness, malaise/fatigue and night sweats.   HENT: Negative for nosebleeds.    Eyes: Negative for blurred vision and double vision.   Cardiovascular: Positive for palpitations. Negative for chest pain, claudication, dyspnea on exertion, irregular heartbeat, leg swelling, near-syncope, orthopnea, paroxysmal nocturnal dyspnea and syncope.   Respiratory: Negative for cough, shortness of breath, sleep disturbances due to breathing, snoring, sputum production and wheezing.    Endocrine: Negative for cold intolerance and polyuria.   Hematologic/Lymphatic: Does not bruise/bleed easily.   Skin: Negative for rash.   Musculoskeletal: Negative for back pain, falls, joint pain, joint swelling and neck pain.   Gastrointestinal: Negative for abdominal pain, heartburn, nausea and vomiting.   Genitourinary: Negative for dysuria, frequency and hematuria.   Neurological:  Negative for difficulty with concentration, dizziness, focal weakness, headaches, light-headedness, numbness and seizures.   Psychiatric/Behavioral: Negative for depression, memory loss and substance abuse. The patient does not have insomnia.    Allergic/Immunologic: Negative for HIV exposure and hives.       Objective:   Physical Exam   Constitutional: She is oriented to person, place, and time. She appears well-nourished.   HENT:   Head: Normocephalic.   Eyes: Pupils are equal, round, and reactive to light.   Neck: Normal carotid pulses and no JVD present. Carotid bruit is not present. No thyromegaly present.   Cardiovascular: Normal rate, regular rhythm, normal heart sounds and normal pulses.   No extrasystoles are present. PMI is not displaced.  Exam reveals no gallop and no S3.    No murmur heard.  Pulmonary/Chest: Breath sounds normal. No stridor. No respiratory distress.   Abdominal: Soft. Bowel sounds are normal. There is no tenderness. There is no rebound.   Musculoskeletal: Normal range of motion.   Neurological: She is alert and oriented to person, place, and time.   Skin: Skin is intact. No rash noted.   Psychiatric: Her behavior is normal.       No results found for: CHOL  No results found for: HDL  No results found for: LDLCALC  No results found for: TRIG  No results found for: CHOLHDL    Chemistry    No results found for: NA, K, CL, CO2, BUN, CREATININE, GLU No results found for: CALCIUM, ALKPHOS, AST, ALT, BILITOT, ESTGFRAFRICA, EGFRNONAA       No results found for: LABA1C, HGBA1C  No results found for: TSH  No results found for: INR, PROTIME  No results found for: WBC, HGB, HCT, MCV, PLT  BNP  @LABRCNTIP(BNP,BNPTRIAGEBLO)@  CrCl cannot be calculated (No order found.).  X-ray Chest Pa And Lateral    Result Date: 7/24/2018  1.  No acute cardiopulmonary process. Electronically signed by: Amor Jackson DO Date:    07/24/2018 Time:    13:59    No results found in the last 24 hours.  No results found in  the last 24 hours.    Assessment:      1. Palpitation    2. Essential hypertension    3. Hyperlipidemia, unspecified hyperlipidemia type       BP high today    Plan:   Palpitation  -     IN OFFICE EKG 12-LEAD (to Muse)  -     Holter Monitor - 3-14 Day Adult; Future  -     2D echo with color flow doppler; Future    Essential hypertension    Hyperlipidemia, unspecified hyperlipidemia type      DASH  Continue current meds.  Recommend heart-healthy diet, weight control and regular exercise.  Dian. Risk modification.   RTC in 6 months    I have reviewed all pertinent labs and cardiac studies. Plans and recommendations have been formulated under my direct supervision. All questions answered and patient voiced understanding. Patient to continue current medications.     ADDENDUM ON 08/14/2018  HOLTER SHOWED PAF.  ADD TOPROLXL 25 MG DAILY  CHADSVAC SCORE 4  ADD ELIQUIS 5 MG BID

## 2018-07-24 NOTE — LETTER
July 24, 2018      Avelino Gudino MD  [address] [facility]  [place] [address]           [facility] - [facility]  [address] [facility] [address] [place] [address]  Phone: [phone]  Fax: [phone]          Patient: Nerissa Burnham   MR Number: [id number]   YOB: 1940   Date of Visit: 7/24/2018       Dear Dr. Avelino Gudino:    Thank you for referring Nerissa Burnham to me for evaluation. Attached you will find relevant portions of my assessment and plan of care.    If you have questions, please do not hesitate to call me. I look forward to following Nerissa Burnham along with you.    Sincerely,    Duke Cueva MD    Enclosure  CC:  No Recipients    If you would like to receive this communication electronically, please contact [email or web address] or [phone] to request more information on [employer] Link access.    For providers and/or their staff who would like to refer a patient to [facility], please contact us through our one-stop-shop provider referral line, [facility] , at [phone].    If you feel you have received this communication in error or would no longer like to receive these types of communications, please e-mail [email or web address]

## 2018-07-25 NOTE — PROGRESS NOTES
Subjective:      Patient ID: Nerissa Burnham is a 78 y.o. female.    Chief Complaint: Diarrhea and Gas    77 yo with Patient Active Problem List:     Hypertension     Hyperlipidemia     Depression     Osteopenia     HSV-2 infection     Anxiety     GERD (gastroesophageal reflux disease)     Palpitation    Past Medical History:  No date: Depression  No date: Hyperlipidemia  No date: Hypertension    Here today c/o chronic diarrhea. New onset palpitations. patientalso here for travel advice due to upcoming cruise to [place], [place], [place], and [place]. She'll be flying to [place].  She is not planning to participate in any animal or humanitarian aid.  She will be sleeping and eating on the cruise ship or at restaurants.      Diarrhea    This is a chronic problem. The current episode started more than 1 year ago. The problem occurs less than 2 times per day. The problem has been unchanged. The stool consistency is described as watery. The patient states that diarrhea does not awaken her from sleep. Associated symptoms include increased flatus. Pertinent negatives include no abdominal pain, bloating, coughing, fever, headaches, myalgias, sweats, URI, vomiting or weight loss. Nothing aggravates the symptoms. There are no known risk factors. Treatments tried: gas ex. The treatment provided no relief. There is no history of bowel resection, inflammatory bowel disease or a recent abdominal surgery.     Review of Systems   Constitutional: Negative for fever and weight loss.   Respiratory: Negative for cough, shortness of breath and wheezing.    Cardiovascular: Positive for palpitations. Negative for chest pain and leg swelling.   Gastrointestinal: Positive for diarrhea and flatus. Negative for abdominal pain, bloating and vomiting.   Musculoskeletal: Negative for myalgias.   Skin: Negative for rash.   Neurological: Negative for headaches.     Objective:   /74 (BP Location: Right arm, Patient Position:  "Sitting)   Pulse 66   Temp 97.1 °F (36.2 °C) (Tympanic)   Ht 5' 5" (1.651 m)   Wt 59.9 kg (132 lb 0.9 oz)   SpO2 97%   BMI 21.98 kg/m²     Physical Exam   Constitutional: She is oriented to person, place, and time. She appears well-developed and well-nourished. No distress.   HENT:   Head: Normocephalic and atraumatic.   Mouth/Throat: Oropharynx is clear and moist.   Eyes: EOM are normal. Pupils are equal, round, and reactive to light.   Neck: Neck supple. No thyromegaly present.   Cardiovascular: Normal rate and regular rhythm.    Pulmonary/Chest: Effort normal and breath sounds normal. She has no wheezes. She has no rales.   Abdominal: Soft. Bowel sounds are normal. There is no tenderness.   Musculoskeletal: She exhibits no edema.   Lymphadenopathy:     She has no cervical adenopathy.   Neurological: She is alert and oriented to person, place, and time.   Skin: Skin is warm and dry.   Psychiatric: She has a normal mood and affect. Her behavior is normal.       Assessment:     1. Diarrhea, unspecified type    2. Preventative health care    3. Palpitations    4. Travel advice encounter      Plan:   Diarrhea, unspecified type  -     Stool culture; Future; Expected date: 07/24/2018  -     Occult blood x 1, stool; Future; Expected date: 07/24/2018  -     Stool Exam-Ova,Cysts,Parasites; Future; Expected date: 07/24/2018  -     WBC, Stool; Future; Expected date: 07/24/2018  -     Clostridium difficile EIA; Future; Expected date: 07/24/2018  -     Giardia / Cryptosporidum, EIA; Future; Expected date: 07/24/2018  -     Pancreatic elastase, fecal; Future; Expected date: 07/24/2018  -     Celiac Disease Panel; Future; Expected date: 07/24/2018  -     typhoid (VIVOTIF) DR capsule; Take 1 capsule by mouth every other day. For 4 doses. Complete one week prior to travel.  Dispense: 4 capsule; Refill: 0  -     Ambulatory referral to Gastroenterology    Preventative health care  -     (In Office Administered) Hepatitis A " Vaccine (Adult) (IM)    Palpitations  -     Ambulatory referral to Cardiology  -     X-Ray Chest PA And Lateral; Future; Expected date: 07/24/2018  -     Comprehensive metabolic panel; Future; Expected date: 07/24/2018  -     CBC auto differential; Future; Expected date: 07/24/2018  -     TSH; Future; Expected date: 07/24/2018    Travel advice encounter    typhoid vaccine and hep a vaccine  frequent ambulation and calf exercises and graduated compression stockings discussed with pt in effort to avoid dvt and edema.       Lab Frequency Next Occurrence   Lipid panel Once 10/15/2018   TSH Once 10/15/2018   CBC auto differential Once 10/15/2018   Comprehensive metabolic panel Once 10/15/2018   Vitamin D Once 10/15/2018   Stool culture Once 07/24/2018   Occult blood x 1, stool Once 07/24/2018   Stool Exam-Ova,Cysts,Parasites Once 07/24/2018   WBC, Stool Once 07/24/2018   Clostridium difficile EIA Once 07/24/2018   Giardia / Cryptosporidum, EIA Once 07/24/2018   Pancreatic elastase, fecal Once 07/24/2018       Problem List Items Addressed This Visit     None      Visit Diagnoses     Diarrhea, unspecified type    -  Primary    Relevant Medications    typhoid (VIVOTIF) DR weston    Other Relevant Orders    Stool culture    Occult blood x 1, stool    Stool Exam-Ova,Cysts,Parasites    WBC, Stool    Clostridium difficile EIA    Giardia / Cryptosporidum, EIA    Pancreatic elastase, fecal    Celiac Disease Panel    Ambulatory referral to Gastroenterology    Preventative health care        Relevant Orders    (In Office Administered) Hepatitis A Vaccine (Adult) (IM) (Completed)    Palpitations        Relevant Orders    Ambulatory referral to Cardiology    X-Ray Chest PA And Lateral (Completed)    Comprehensive metabolic panel (Completed)    CBC auto differential    TSH (Completed)    Travel advice encounter              Follow-up if symptoms worsen or fail to improve.

## 2018-07-26 ENCOUNTER — LAB VISIT (OUTPATIENT)
Dept: LAB | Facility: HOSPITAL | Age: 78
End: 2018-07-26
Attending: INTERNAL MEDICINE
Payer: MEDICARE

## 2018-07-26 DIAGNOSIS — R19.7 DIARRHEA, UNSPECIFIED TYPE: ICD-10-CM

## 2018-07-26 LAB — OB PNL STL: NEGATIVE

## 2018-07-26 PROCEDURE — 87045 FECES CULTURE AEROBIC BACT: CPT

## 2018-07-26 PROCEDURE — 87427 SHIGA-LIKE TOXIN AG IA: CPT | Mod: 59

## 2018-07-26 PROCEDURE — 87209 SMEAR COMPLEX STAIN: CPT

## 2018-07-26 PROCEDURE — 89055 LEUKOCYTE ASSESSMENT FECAL: CPT

## 2018-07-26 PROCEDURE — 82272 OCCULT BLD FECES 1-3 TESTS: CPT

## 2018-07-26 PROCEDURE — 87328 CRYPTOSPORIDIUM AG IA: CPT

## 2018-07-26 PROCEDURE — 87046 STOOL CULTR AEROBIC BACT EA: CPT

## 2018-07-26 PROCEDURE — 82656 EL-1 FECAL QUAL/SEMIQ: CPT

## 2018-07-27 LAB
CRYPTOSP AG STL QL IA: NEGATIVE
G LAMBLIA AG STL QL IA: NEGATIVE
O+P STL TRI STN: NORMAL
WBC #/AREA STL HPF: NORMAL /[HPF]

## 2018-07-30 ENCOUNTER — CLINICAL SUPPORT (OUTPATIENT)
Dept: CARDIOLOGY | Facility: CLINIC | Age: 78
End: 2018-07-30
Attending: INTERNAL MEDICINE
Payer: MEDICARE

## 2018-07-30 DIAGNOSIS — R00.2 PALPITATION: ICD-10-CM

## 2018-07-30 LAB
AORTIC VALVE REGURGITATION: ABNORMAL
BACTERIA STL CULT: NORMAL
E COLI SXT1 STL QL IA: NEGATIVE
E COLI SXT2 STL QL IA: NEGATIVE
ESTIMATED PA SYSTOLIC PRESSURE: 26.81
MITRAL VALVE MOBILITY: NORMAL
RETIRED EF AND QEF - SEE NOTES: 60 (ref 55–65)

## 2018-07-30 PROCEDURE — 93306 TTE W/DOPPLER COMPLETE: CPT | Mod: PBBFAC,PO | Performed by: NUCLEAR MEDICINE

## 2018-07-30 PROCEDURE — 0298T HOLTER MONITOR - 3-14 DAY ADULT: CPT | Mod: ,,, | Performed by: INTERNAL MEDICINE

## 2018-07-30 PROCEDURE — 0296T HOLTER MONITOR - 3-14 DAY ADULT: CPT | Mod: PBBFAC,PO | Performed by: INTERNAL MEDICINE

## 2018-08-03 LAB — ELASTASE 1, FECAL: 327 MCG/G

## 2018-08-07 ENCOUNTER — TELEPHONE (OUTPATIENT)
Dept: CARDIOLOGY | Facility: CLINIC | Age: 78
End: 2018-08-07

## 2018-08-07 ENCOUNTER — INITIAL CONSULT (OUTPATIENT)
Dept: GASTROENTEROLOGY | Facility: CLINIC | Age: 78
End: 2018-08-07
Payer: MEDICARE

## 2018-08-07 VITALS
HEART RATE: 62 BPM | HEIGHT: 65 IN | SYSTOLIC BLOOD PRESSURE: 140 MMHG | BODY MASS INDEX: 23.28 KG/M2 | DIASTOLIC BLOOD PRESSURE: 68 MMHG | WEIGHT: 139.75 LBS

## 2018-08-07 DIAGNOSIS — R14.3 FLATULENCE: ICD-10-CM

## 2018-08-07 DIAGNOSIS — K52.9 CHRONIC DIARRHEA: Primary | ICD-10-CM

## 2018-08-07 PROCEDURE — 99999 PR PBB SHADOW E&M-EST. PATIENT-LVL III: CPT | Mod: PBBFAC,,, | Performed by: NURSE PRACTITIONER

## 2018-08-07 PROCEDURE — 99213 OFFICE O/P EST LOW 20 MIN: CPT | Mod: PBBFAC,PO | Performed by: NURSE PRACTITIONER

## 2018-08-07 PROCEDURE — 99214 OFFICE O/P EST MOD 30 MIN: CPT | Mod: S$PBB,,, | Performed by: NURSE PRACTITIONER

## 2018-08-07 NOTE — TELEPHONE ENCOUNTER
----- Message from Duke Cueva MD sent at 8/6/2018  6:16 PM CDT -----  Echo showed normal EF, mild LAE and mild AI

## 2018-08-07 NOTE — PROGRESS NOTES
Clinic Consult:  [facility] Consultation Note    Reason for Consult:  The primary encounter diagnosis was Chronic diarrhea. A diagnosis of Flatulence was also pertinent to this visit.    PCP: Avelino Gudino   [address] [place] [address]    HPI:  This is a 78 y.o. female here for evaluation of the above. She was referred to me by Dr. Gudino. She presents to clinic with complaints of chronic diarrhea. Onset was about 2-3 years ago. She reports having 1-2 loose to watery bowel movements per day. She denies any associated abdominal pain, hematochezia, or melena. No nocturnal diarrhea. Symptoms have not worsened since onset. She had two previous colonoscopies but were for screening only. Per patient, last one was with [facility] about 3 years ago. She does have a history of colon polyps. She does admit to having increase gas. She is going on an extended cruise for 2 weeks out of the country. She is concerned about the gas as she will be sharing a room with a friend. She has tried imodium for diarrhea but reports it will constipate her for 3 days.     Review of Systems   Constitutional: Negative for fever, malaise/fatigue and weight loss.   HENT: Negative for sore throat.    Respiratory: Negative for cough and wheezing.    Cardiovascular: Negative for chest pain and palpitations.   Gastrointestinal: Positive for diarrhea. Negative for abdominal pain, blood in stool, constipation, heartburn, melena, nausea and vomiting.        Flatulence    Genitourinary: Negative for dysuria and frequency.   Musculoskeletal: Negative for back pain, joint pain, myalgias and neck pain.   Skin: Negative for itching and rash.   Neurological: Negative for dizziness, speech change, seizures, loss of consciousness and headaches.   Psychiatric/Behavioral: Negative for depression and substance abuse. The patient is not nervous/anxious.        Medical History:  has a past medical history of Depression;  "Hyperlipidemia; and Hypertension.    Surgical History:  has a past surgical history that includes Hysterectomy; Oophorectomy; and Breast surgery (Bilateral).    Family History: family history includes Breast cancer in her mother; Cancer in her mother; No Known Problems in her father..     Social History:  reports that she has never smoked. She has never used smokeless tobacco. She reports that she drinks about 8.4 oz of alcohol per week . She reports that she does not use drugs.    Allergies: Reviewed    Home Medications:   Current Outpatient Prescriptions on File Prior to Visit   Medication Sig Dispense Refill    alendronate (FOSAMAX) 70 MG tablet Take 1 tablet (70 mg total) by mouth every 7 days. 12 tablet 3    atorvastatin (LIPITOR) 10 MG tablet Take 1 tablet (10 mg total) by mouth once daily. 90 tablet 1    betamethasone dipropionate (DIPROLENE) 0.05 % cream Apply topically as needed.  6    cholecalciferol, vitamin D3, (VITAMIN D3) 1,000 unit capsule Take 1,000 Units by mouth once daily.      escitalopram oxalate (LEXAPRO) 20 MG tablet Take 1 tablet (20 mg total) by mouth once daily. 90 tablet 1    famciclovir (FAMVIR) 500 MG tablet Take 2 tablets by mouth as needed.  2    psyllium husk (METAMUCIL ORAL) Take by mouth. 2 wafers once daily      ramipril (ALTACE) 5 MG capsule Take 1 capsule (5 mg total) by mouth once daily. 90 capsule 1    traZODone (DESYREL) 50 MG tablet Take 1 tablet (50 mg total) by mouth nightly. 90 tablet 1    typhoid (VIVOTIF) DR capsule Take 1 capsule by mouth every other day. For 4 doses. Complete one week prior to travel. 4 capsule 0     No current facility-administered medications on file prior to visit.        Physical Exam:  BP (!) 140/68   Pulse 62   Ht 5' 4.5" (1.638 m)   Wt 63.4 kg (139 lb 12.4 oz)   BMI 23.62 kg/m²   Body mass index is 23.62 kg/m².  Physical Exam   Constitutional: She is oriented to person, place, and time and well-developed, well-nourished, and in no " distress. No distress.   HENT:   Head: Normocephalic.   Eyes: Conjunctivae are normal. Pupils are equal, round, and reactive to light.   Cardiovascular: Normal rate, regular rhythm and normal heart sounds.    Pulmonary/Chest: Effort normal and breath sounds normal. No respiratory distress.   Abdominal: Soft. Bowel sounds are normal. She exhibits no distension. There is no tenderness.   Neurological: She is alert and oriented to person, place, and time. No cranial nerve deficit.   Skin: Skin is warm and dry. No rash noted.   Psychiatric: Mood and affect normal.       Labs: Pertinent labs reviewed.  CRC Screening: up to date. Will request records    Assessment:  1. Chronic diarrhea    2. Flatulence         Recommendations:  Chronic diarrhea  - 1-2 loose to watery stools per day for about 2-3 years now  - recent stool studies unrevealing  - unclear etiology but could be IBS vs microscopic colitis.   - may need repeat colonoscopy with biopsies but unable to get procedure scheduled prior to departure for vacation.  - at this time, recommend starting pepto-bismol   - follow up in 8 weeks    Flatulence  - gas X and low FODMAP diet.    Follow-up in about 8 weeks (around 10/2/2018).    Thank you so much for allowing me to participate in the care of SHADI Hanson

## 2018-08-07 NOTE — LETTER
August 7, 2018      Avelino Gudino MD  [address] [place] [address]           [facility]  [address] [place] [address]  Phone: [phone]  Fax: [phone]          Patient: Nerissa Burnham   MR Number: [id number]   YOB: 1940   Date of Visit: 8/7/2018       Dear Dr. Avelino Gudino:    Thank you for referring Nerissa Burnham to me for evaluation. Attached you will find relevant portions of my assessment and plan of care.    If you have questions, please do not hesitate to call me. I look forward to following Nerissa Burnham along with you.    Sincerely,    Teetee Barakat, RAFFAELE    Enclosure  CC:  No Recipients    If you would like to receive this communication electronically, please contact [email or web address] or [phone] to request more information on [employer] Link access.    For providers and/or their staff who would like to refer a patient to [facility], please contact us through our one-stop-shop provider referral line, [facility] , at [phone].    If you feel you have received this communication in error or would no longer like to receive these types of communications, please e-mail [email or web address]

## 2018-08-07 NOTE — TELEPHONE ENCOUNTER
----- Message from Yelena Warren MA sent at 8/7/2018  8:59 AM CDT -----  I have attempted without success to contact this patient by phone left message for pt to call back.

## 2018-08-07 NOTE — TELEPHONE ENCOUNTER
This is a HIPPA violation.  Caller will need to get this information from the patient.     ----- Message from Demetrice Layne sent at 8/7/2018  2:40 PM CDT -----  Contact: Rupa/[employer]  States she needs to get the patients Medicare#. States she has a secure voicemail, if she doesn't answer, please leave a message. Please call Rupa at [phone]. Thank you

## 2018-08-14 ENCOUNTER — TELEPHONE (OUTPATIENT)
Dept: CARDIOLOGY | Facility: CLINIC | Age: 78
End: 2018-08-14

## 2018-08-14 DIAGNOSIS — I48.0 PAF (PAROXYSMAL ATRIAL FIBRILLATION): ICD-10-CM

## 2018-08-14 RX ORDER — METOPROLOL SUCCINATE 25 MG/1
25 TABLET, EXTENDED RELEASE ORAL DAILY
Qty: 30 TABLET | Refills: 11 | Status: SHIPPED | OUTPATIENT
Start: 2018-08-14 | End: 2019-08-14 | Stop reason: SDUPTHER

## 2018-08-15 ENCOUNTER — TELEPHONE (OUTPATIENT)
Dept: CARDIOLOGY | Facility: CLINIC | Age: 78
End: 2018-08-15

## 2018-08-15 NOTE — TELEPHONE ENCOUNTER
----- Message from Duke Cueva MD sent at 8/14/2018 10:24 PM CDT -----  HOLTER SHOWED PAF.  ADD TOPROLXL 25 MG DAILY  CHADSVAC SCORE 4  ADD ELIQUIS 5 MG BID

## 2018-08-15 NOTE — TELEPHONE ENCOUNTER
HOLTER SHOWED PAF.  ADD TOPROLXL 25 MG DAILY  CHADSVAC SCORE 4  ADD ELIQUIS 5 MG BID    PLEASE SCHEDULE A F/U IN 3 MONTHS

## 2018-08-15 NOTE — TELEPHONE ENCOUNTER
Spoke with and clarified Metoprolol was in addition to rampipril    ----- Message from Geri Galindo sent at 8/15/2018  3:47 PM CDT -----  Contact: self   Patient would like to consult with nurse regarding new medication.The bottle says it is a BP medication and would like to know since she is already taking one should she stop the old one and start the new one given. Please call back at [phone].    Thanks,  Geri Galindo

## 2018-08-20 ENCOUNTER — TELEPHONE (OUTPATIENT)
Dept: INTERNAL MEDICINE | Facility: CLINIC | Age: 78
End: 2018-08-20

## 2018-08-20 NOTE — TELEPHONE ENCOUNTER
----- Message from Elise Mcallister sent at 8/20/2018  9:30 AM CDT -----  Contact: pt  The pt states she is having trouble holding her urine and wants to know if she should see a Urologist, the pt request a call at [phone]///[email or web address]

## 2018-08-20 NOTE — TELEPHONE ENCOUNTER
Spoke with pt, she states that she forgot to mention to the doctor at her last visit that she is having trouble holding her urine. Denies any other symptoms. States this has been going on for about a year and a half. Pt wants to know should she be taking anything or does she need to see Urologist. Please advise?

## 2018-08-20 NOTE — TELEPHONE ENCOUNTER
Spoke with pt, notified her that Dr. Gudino recommends she discuss further with a Urologist. Advised pt that our next available Urology appointment is not until 12/11/18. Pt stated she would like to be seen before her cruise in September. Advised pt that she can contact insurance company and see which Urologists are in network and we can fax referral over. Pt verbalized understanding.

## 2018-08-20 NOTE — TELEPHONE ENCOUNTER
----- Message from Damari Walters sent at 8/20/2018  9:43 AM CDT -----  Contact: Pt   States she's rtn nurses call and can be reached at [phone]

## 2018-08-20 NOTE — TELEPHONE ENCOUNTER
Tried to reach home number that was given in call but number is no longer in service. Left message on mobile phone to return call.

## 2018-08-30 DIAGNOSIS — B00.9 HSV-2 INFECTION: ICD-10-CM

## 2018-08-30 RX ORDER — FAMCICLOVIR 500 MG/1
1000 TABLET ORAL 2 TIMES DAILY
Qty: 8 TABLET | Refills: 2 | Status: SHIPPED | OUTPATIENT
Start: 2018-08-30 | End: 2019-01-08 | Stop reason: SDUPTHER

## 2018-09-03 ENCOUNTER — PATIENT MESSAGE (OUTPATIENT)
Dept: GASTROENTEROLOGY | Facility: CLINIC | Age: 78
End: 2018-09-03

## 2018-09-04 DIAGNOSIS — K52.9 CHRONIC DIARRHEA: Primary | ICD-10-CM

## 2018-09-04 RX ORDER — SODIUM, POTASSIUM,MAG SULFATES 17.5-3.13G
SOLUTION, RECONSTITUTED, ORAL ORAL
Qty: 354 ML | Refills: 0 | Status: SHIPPED | OUTPATIENT
Start: 2018-09-04 | End: 2019-11-07

## 2018-09-04 NOTE — PROGRESS NOTES
See patient e-mail for details. Please arrange for  of stool collection kit and schedule colonoscopy with suprep (was sent to pharmacy)

## 2018-09-11 ENCOUNTER — DOCUMENTATION ONLY (OUTPATIENT)
Dept: ENDOSCOPY | Facility: HOSPITAL | Age: 78
End: 2018-09-11

## 2018-09-25 ENCOUNTER — TELEPHONE (OUTPATIENT)
Dept: INTERNAL MEDICINE | Facility: CLINIC | Age: 78
End: 2018-09-25

## 2018-09-25 DIAGNOSIS — Z12.39 BREAST CANCER SCREENING: Primary | ICD-10-CM

## 2018-09-25 NOTE — TELEPHONE ENCOUNTER
----- Message from Lexis Amanda sent at 9/25/2018 12:58 PM CDT -----  Contact: Nerissa [phone]  Patient is requesting a order for mammogram.

## 2018-10-04 ENCOUNTER — HOSPITAL ENCOUNTER (OUTPATIENT)
Dept: RADIOLOGY | Facility: HOSPITAL | Age: 78
Discharge: HOME | End: 2018-10-04
Attending: INTERNAL MEDICINE
Payer: MEDICARE

## 2018-10-04 DIAGNOSIS — Z12.39 BREAST CANCER SCREENING: ICD-10-CM

## 2018-10-04 PROCEDURE — 77067 SCR MAMMO BI INCL CAD: CPT | Mod: 26,,, | Performed by: RADIOLOGY

## 2018-10-04 PROCEDURE — 77063 BREAST TOMOSYNTHESIS BI: CPT | Mod: 26,,, | Performed by: RADIOLOGY

## 2018-10-04 PROCEDURE — 77067 SCR MAMMO BI INCL CAD: CPT | Mod: TC,PO

## 2018-10-04 PROCEDURE — 77063 BREAST TOMOSYNTHESIS BI: CPT | Mod: TC,PO

## 2018-10-06 DIAGNOSIS — F41.9 ANXIETY: ICD-10-CM

## 2018-10-06 RX ORDER — ESCITALOPRAM OXALATE 20 MG/1
TABLET ORAL
Qty: 90 TABLET | Refills: 1 | Status: SHIPPED | OUTPATIENT
Start: 2018-10-06 | End: 2018-10-25 | Stop reason: SDUPTHER

## 2018-10-17 ENCOUNTER — LAB VISIT (OUTPATIENT)
Dept: LAB | Facility: HOSPITAL | Age: 78
End: 2018-10-17
Attending: INTERNAL MEDICINE
Payer: MEDICARE

## 2018-10-17 DIAGNOSIS — E78.5 HYPERLIPIDEMIA, UNSPECIFIED HYPERLIPIDEMIA TYPE: ICD-10-CM

## 2018-10-17 DIAGNOSIS — M81.0 OSTEOPOROSIS, UNSPECIFIED OSTEOPOROSIS TYPE, UNSPECIFIED PATHOLOGICAL FRACTURE PRESENCE: ICD-10-CM

## 2018-10-17 LAB
ALBUMIN SERPL BCP-MCNC: 3.9 G/DL
ALP SERPL-CCNC: 49 U/L
ALT SERPL W/O P-5'-P-CCNC: 9 U/L
ANION GAP SERPL CALC-SCNC: 8 MMOL/L
AST SERPL-CCNC: 17 U/L
BASOPHILS # BLD AUTO: 0.05 K/UL
BASOPHILS NFR BLD: 0.9 %
BILIRUB SERPL-MCNC: 0.6 MG/DL
BUN SERPL-MCNC: 22 MG/DL
CALCIUM SERPL-MCNC: 9.8 MG/DL
CHLORIDE SERPL-SCNC: 106 MMOL/L
CHOLEST SERPL-MCNC: 181 MG/DL
CHOLEST/HDLC SERPL: 2.4 {RATIO}
CO2 SERPL-SCNC: 28 MMOL/L
CREAT SERPL-MCNC: 1 MG/DL
DIFFERENTIAL METHOD: ABNORMAL
EOSINOPHIL # BLD AUTO: 0.2 K/UL
EOSINOPHIL NFR BLD: 4.2 %
ERYTHROCYTE [DISTWIDTH] IN BLOOD BY AUTOMATED COUNT: 12.6 %
EST. GFR  (AFRICAN AMERICAN): >60 ML/MIN/1.73 M^2
EST. GFR  (NON AFRICAN AMERICAN): 54.9 ML/MIN/1.73 M^2
GLUCOSE SERPL-MCNC: 85 MG/DL
HCT VFR BLD AUTO: 40.9 %
HDLC SERPL-MCNC: 74 MG/DL
HDLC SERPL: 40.9 %
HGB BLD-MCNC: 13 G/DL
IMM GRANULOCYTES # BLD AUTO: 0.03 K/UL
IMM GRANULOCYTES NFR BLD AUTO: 0.6 %
LDLC SERPL CALC-MCNC: 91.8 MG/DL
LYMPHOCYTES # BLD AUTO: 1 K/UL
LYMPHOCYTES NFR BLD: 18.5 %
MCH RBC QN AUTO: 31.3 PG
MCHC RBC AUTO-ENTMCNC: 31.8 G/DL
MCV RBC AUTO: 98 FL
MONOCYTES # BLD AUTO: 0.6 K/UL
MONOCYTES NFR BLD: 11 %
NEUTROPHILS # BLD AUTO: 3.5 K/UL
NEUTROPHILS NFR BLD: 64.8 %
NONHDLC SERPL-MCNC: 107 MG/DL
NRBC BLD-RTO: 0 /100 WBC
PLATELET # BLD AUTO: 261 K/UL
PMV BLD AUTO: 11.1 FL
POTASSIUM SERPL-SCNC: 4.8 MMOL/L
PROT SERPL-MCNC: 6.5 G/DL
RBC # BLD AUTO: 4.16 M/UL
SODIUM SERPL-SCNC: 142 MMOL/L
TRIGL SERPL-MCNC: 76 MG/DL
TSH SERPL DL<=0.005 MIU/L-ACNC: 1.98 UIU/ML
WBC # BLD AUTO: 5.45 K/UL

## 2018-10-17 PROCEDURE — 85025 COMPLETE CBC W/AUTO DIFF WBC: CPT

## 2018-10-17 PROCEDURE — 36415 COLL VENOUS BLD VENIPUNCTURE: CPT | Mod: PO

## 2018-10-17 PROCEDURE — 80053 COMPREHEN METABOLIC PANEL: CPT

## 2018-10-17 PROCEDURE — 80061 LIPID PANEL: CPT

## 2018-10-17 PROCEDURE — 82306 VITAMIN D 25 HYDROXY: CPT

## 2018-10-17 PROCEDURE — 84443 ASSAY THYROID STIM HORMONE: CPT

## 2018-10-18 LAB — 25(OH)D3+25(OH)D2 SERPL-MCNC: 47 NG/ML

## 2018-10-25 ENCOUNTER — TELEPHONE (OUTPATIENT)
Dept: INTERNAL MEDICINE | Facility: CLINIC | Age: 78
End: 2018-10-25

## 2018-10-25 ENCOUNTER — LAB VISIT (OUTPATIENT)
Dept: LAB | Facility: HOSPITAL | Age: 78
End: 2018-10-25
Attending: INTERNAL MEDICINE
Payer: MEDICARE

## 2018-10-25 ENCOUNTER — OFFICE VISIT (OUTPATIENT)
Dept: INTERNAL MEDICINE | Facility: CLINIC | Age: 78
End: 2018-10-25
Payer: MEDICARE

## 2018-10-25 VITALS
HEIGHT: 65 IN | OXYGEN SATURATION: 96 % | DIASTOLIC BLOOD PRESSURE: 62 MMHG | TEMPERATURE: 98 F | HEART RATE: 60 BPM | SYSTOLIC BLOOD PRESSURE: 118 MMHG | BODY MASS INDEX: 22.48 KG/M2 | WEIGHT: 134.94 LBS

## 2018-10-25 DIAGNOSIS — R30.0 DYSURIA: Primary | ICD-10-CM

## 2018-10-25 DIAGNOSIS — R35.0 URINARY FREQUENCY: ICD-10-CM

## 2018-10-25 DIAGNOSIS — R30.0 DYSURIA: ICD-10-CM

## 2018-10-25 DIAGNOSIS — N39.0 URINARY TRACT INFECTION WITHOUT HEMATURIA, SITE UNSPECIFIED: Primary | ICD-10-CM

## 2018-10-25 DIAGNOSIS — N39.0 URINARY TRACT INFECTION WITHOUT HEMATURIA, SITE UNSPECIFIED: ICD-10-CM

## 2018-10-25 DIAGNOSIS — E78.5 HYPERLIPIDEMIA, UNSPECIFIED HYPERLIPIDEMIA TYPE: ICD-10-CM

## 2018-10-25 DIAGNOSIS — I48.0 PAF (PAROXYSMAL ATRIAL FIBRILLATION): ICD-10-CM

## 2018-10-25 DIAGNOSIS — F32.A DEPRESSION, UNSPECIFIED DEPRESSION TYPE: ICD-10-CM

## 2018-10-25 DIAGNOSIS — I10 ESSENTIAL HYPERTENSION: ICD-10-CM

## 2018-10-25 DIAGNOSIS — F41.9 ANXIETY: ICD-10-CM

## 2018-10-25 LAB
BACTERIA #/AREA URNS HPF: ABNORMAL /HPF
BILIRUB UR QL STRIP: NEGATIVE
CLARITY UR: ABNORMAL
COLOR UR: YELLOW
GLUCOSE UR QL STRIP: NEGATIVE
HGB UR QL STRIP: ABNORMAL
KETONES UR QL STRIP: NEGATIVE
LEUKOCYTE ESTERASE UR QL STRIP: ABNORMAL
MICROSCOPIC COMMENT: ABNORMAL
NITRITE UR QL STRIP: POSITIVE
PH UR STRIP: 6 [PH] (ref 5–8)
PROT UR QL STRIP: NEGATIVE
RBC #/AREA URNS HPF: 2 /HPF (ref 0–4)
SP GR UR STRIP: 1.02 (ref 1–1.03)
URN SPEC COLLECT METH UR: ABNORMAL
WBC #/AREA URNS HPF: >100 /HPF (ref 0–5)

## 2018-10-25 PROCEDURE — 87088 URINE BACTERIA CULTURE: CPT

## 2018-10-25 PROCEDURE — 99214 OFFICE O/P EST MOD 30 MIN: CPT | Mod: S$PBB,,, | Performed by: INTERNAL MEDICINE

## 2018-10-25 PROCEDURE — 87086 URINE CULTURE/COLONY COUNT: CPT

## 2018-10-25 PROCEDURE — 87186 SC STD MICRODIL/AGAR DIL: CPT

## 2018-10-25 PROCEDURE — 99999 PR PBB SHADOW E&M-EST. PATIENT-LVL IV: CPT | Mod: PBBFAC,,, | Performed by: INTERNAL MEDICINE

## 2018-10-25 PROCEDURE — 87077 CULTURE AEROBIC IDENTIFY: CPT

## 2018-10-25 PROCEDURE — 99214 OFFICE O/P EST MOD 30 MIN: CPT | Mod: PBBFAC,PO | Performed by: INTERNAL MEDICINE

## 2018-10-25 PROCEDURE — 81000 URINALYSIS NONAUTO W/SCOPE: CPT | Mod: PO

## 2018-10-25 RX ORDER — OXYBUTYNIN CHLORIDE 5 MG/1
5 TABLET, EXTENDED RELEASE ORAL
COMMUNITY
Start: 2017-07-17 | End: 2019-04-25

## 2018-10-25 RX ORDER — NITROFURANTOIN 25; 75 MG/1; MG/1
100 CAPSULE ORAL 2 TIMES DAILY
Qty: 14 CAPSULE | Refills: 0 | Status: SHIPPED | OUTPATIENT
Start: 2018-10-25 | End: 2019-04-25

## 2018-10-25 NOTE — PROGRESS NOTES
"Subjective:      Patient ID: Nerissa Burnham is a 78 y.o. female.    Chief Complaint: Dysuria    HPI   Here today for management fo mul tmed problems as outlned below and c/o Burning with urination. Frequency of urination. Urinary symptoms x 5 days. Worsening. Also reports difficulty recalling names of well known friends and objects. No dangerous situations or getting lost. Has left oven on overnight.     Review of Systems   Constitutional: Negative for chills and fever.   HENT: Negative for ear pain and sore throat.    Respiratory: Negative for cough.    Cardiovascular: Negative for chest pain.   Gastrointestinal: Negative for abdominal pain and blood in stool.   Genitourinary: Negative for dysuria and hematuria.   Neurological: Negative for seizures and syncope.     Objective:   /62 (BP Location: Right arm, Patient Position: Sitting)   Pulse 60   Temp 98.2 °F (36.8 °C) (Tympanic)   Ht 5' 4.5" (1.638 m)   Wt 61.2 kg (134 lb 14.7 oz)   SpO2 96%   BMI 22.80 kg/m²     Physical Exam   Constitutional: She appears well-developed and well-nourished. No distress.   HENT:   Head: Normocephalic and atraumatic.   Eyes: Conjunctivae and EOM are normal.   Neck: Normal range of motion. Neck supple.   Cardiovascular: Normal rate and regular rhythm.   Pulmonary/Chest: Effort normal.   Abdominal: Soft. Bowel sounds are normal.   Musculoskeletal: She exhibits no edema.   Neurological: She is alert.   Skin: Skin is warm and dry.   Psychiatric: She has a normal mood and affect. Her behavior is normal.         Assessment:     1. Dysuria    2. Hyperlipidemia, unspecified hyperlipidemia type    3. Essential hypertension    4. PAF (paroxysmal atrial fibrillation)    5. Depression, unspecified depression type    6. Anxiety    7. Urinary frequency      Plan:   Dysuria  -     Urinalysis; Future; Expected date: 10/25/2018  -     nitrofurantoin, macrocrystal-monohydrate, (MACROBID) 100 MG capsule; Take 1 capsule (100 mg total) by " mouth 2 (two) times daily.  Dispense: 14 capsule; Refill: 0    Hyperlipidemia, unspecified hyperlipidemia type  Controlled  Essential hypertension  contolre  PAF (paroxysmal atrial fibrillation)  Controlled    Depression, unspecified depression type    Anxiety  Controlled    Urinary frequency  -     nitrofurantoin, macrocrystal-monohydrate, (MACROBID) 100 MG capsule; Take 1 capsule (100 mg total) by mouth 2 (two) times daily.  Dispense: 14 capsule; Refill: 0        Lab Frequency Next Occurrence   Stool Exam-Ova,Cysts,Parasites Once 09/04/2018   Stool culture Once 09/04/2018   Giardia / Cryptosporidum, EIA Once 09/04/2018   WBC, Stool Once 09/04/2018   Pancreatic elastase, fecal Once 09/04/2018   Fecal fat, qualitative Once 09/04/2018       Problem List Items Addressed This Visit        Psychiatric    Depression    Anxiety       Cardiac/Vascular    Hypertension    Hyperlipidemia    PAF (paroxysmal atrial fibrillation)      Other Visit Diagnoses     Dysuria    -  Primary    Relevant Medications    nitrofurantoin, macrocrystal-monohydrate, (MACROBID) 100 MG capsule    Other Relevant Orders    Urinalysis (Completed)    Urinary frequency        Relevant Medications    nitrofurantoin, macrocrystal-monohydrate, (MACROBID) 100 MG capsule          Follow-up in about 6 months (around 4/25/2019), or if symptoms worsen or fail to improve.

## 2018-10-29 LAB — BACTERIA UR CULT: NORMAL

## 2018-11-08 DIAGNOSIS — E78.5 HYPERLIPIDEMIA, UNSPECIFIED HYPERLIPIDEMIA TYPE: ICD-10-CM

## 2018-11-08 RX ORDER — ATORVASTATIN CALCIUM 10 MG/1
TABLET, FILM COATED ORAL
Qty: 90 TABLET | Refills: 1 | Status: SHIPPED | OUTPATIENT
Start: 2018-11-08 | End: 2019-04-25 | Stop reason: SDUPTHER

## 2018-11-12 ENCOUNTER — PATIENT MESSAGE (OUTPATIENT)
Dept: GASTROENTEROLOGY | Facility: CLINIC | Age: 78
End: 2018-11-12

## 2018-11-15 ENCOUNTER — OFFICE VISIT (OUTPATIENT)
Dept: CARDIOLOGY | Facility: CLINIC | Age: 78
End: 2018-11-15
Payer: MEDICARE

## 2018-11-15 VITALS
HEIGHT: 65 IN | WEIGHT: 132.06 LBS | HEART RATE: 64 BPM | SYSTOLIC BLOOD PRESSURE: 96 MMHG | DIASTOLIC BLOOD PRESSURE: 56 MMHG | BODY MASS INDEX: 22 KG/M2

## 2018-11-15 DIAGNOSIS — I10 ESSENTIAL HYPERTENSION: ICD-10-CM

## 2018-11-15 DIAGNOSIS — R00.2 PALPITATION: ICD-10-CM

## 2018-11-15 DIAGNOSIS — I48.0 PAF (PAROXYSMAL ATRIAL FIBRILLATION): Primary | ICD-10-CM

## 2018-11-15 DIAGNOSIS — E78.5 HYPERLIPIDEMIA, UNSPECIFIED HYPERLIPIDEMIA TYPE: ICD-10-CM

## 2018-11-15 PROCEDURE — 99999 PR PBB SHADOW E&M-EST. PATIENT-LVL III: CPT | Mod: PBBFAC,,, | Performed by: INTERNAL MEDICINE

## 2018-11-15 PROCEDURE — 99213 OFFICE O/P EST LOW 20 MIN: CPT | Mod: PBBFAC,PO | Performed by: INTERNAL MEDICINE

## 2018-11-15 PROCEDURE — 99214 OFFICE O/P EST MOD 30 MIN: CPT | Mod: S$PBB,,, | Performed by: INTERNAL MEDICINE

## 2018-11-15 NOTE — PROGRESS NOTES
Subjective:   Patient ID:  Nerissa Burnham is a 78 y.o. female who presents for follow up of Follow-up      79 yo female referred for palpitation.  PMH PAF, HTN, HLD and depression.  No smoking.  Two drinking daily.   States that having palpitation if drinking fast.  Still palpitation, slightly improved.   No chest pain, dyspnea, dizziness, orthopnea.  Some wheezing and cough recently   passed away in . Since then, the palpitation seems worse.    ECHo showed normal EF,mild AI, LAE and garde I DD  holter showed PAF        Past Medical History:   Diagnosis Date    Depression     Hyperlipidemia     Hypertension        Past Surgical History:   Procedure Laterality Date    BREAST SURGERY Bilateral     Augmentation    HYSTERECTOMY      OOPHORECTOMY         Social History     Tobacco Use    Smoking status: Never Smoker    Smokeless tobacco: Never Used   Substance Use Topics    Alcohol use: Yes     Alcohol/week: 8.4 oz     Types: 14 Glasses of wine per week    Drug use: No       Family History   Problem Relation Age of Onset    Cancer Mother         Breast    Breast cancer Mother     No Known Problems Father     Breast cancer Maternal Aunt          Review of Systems   Constitution: Negative for decreased appetite, diaphoresis, fever, weakness, malaise/fatigue and night sweats.   HENT: Negative for nosebleeds.    Eyes: Negative for blurred vision and double vision.   Cardiovascular: Positive for palpitations. Negative for chest pain, claudication, dyspnea on exertion, irregular heartbeat, leg swelling, near-syncope, orthopnea, paroxysmal nocturnal dyspnea and syncope.   Respiratory: Negative for cough, shortness of breath, sleep disturbances due to breathing, snoring, sputum production and wheezing.    Endocrine: Negative for cold intolerance and polyuria.   Hematologic/Lymphatic: Does not bruise/bleed easily.   Skin: Negative for rash.   Musculoskeletal: Negative for back pain, falls, joint  pain, joint swelling and neck pain.   Gastrointestinal: Negative for abdominal pain, heartburn, nausea and vomiting.   Genitourinary: Negative for dysuria, frequency and hematuria.   Neurological: Negative for difficulty with concentration, dizziness, focal weakness, headaches, light-headedness, numbness and seizures.   Psychiatric/Behavioral: Negative for depression, memory loss and substance abuse. The patient does not have insomnia.    Allergic/Immunologic: Negative for HIV exposure and hives.       Objective:   Physical Exam   Constitutional: She is oriented to person, place, and time. She appears well-nourished.   HENT:   Head: Normocephalic.   Eyes: Pupils are equal, round, and reactive to light.   Neck: Normal carotid pulses and no JVD present. Carotid bruit is not present. No thyromegaly present.   Cardiovascular: Normal rate, regular rhythm, normal heart sounds and normal pulses.  No extrasystoles are present. PMI is not displaced. Exam reveals no gallop and no S3.   No murmur heard.  Pulmonary/Chest: Breath sounds normal. No stridor. No respiratory distress.   Wheezing on right   Abdominal: Soft. Bowel sounds are normal. There is no tenderness. There is no rebound.   Musculoskeletal: Normal range of motion.   Neurological: She is alert and oriented to person, place, and time.   Skin: Skin is intact. No rash noted.   Psychiatric: Her behavior is normal.       Lab Results   Component Value Date    CHOL 181 10/17/2018     Lab Results   Component Value Date    HDL 74 10/17/2018     Lab Results   Component Value Date    LDLCALC 91.8 10/17/2018     Lab Results   Component Value Date    TRIG 76 10/17/2018     Lab Results   Component Value Date    CHOLHDL 40.9 10/17/2018       Chemistry        Component Value Date/Time     10/17/2018 0815    K 4.8 10/17/2018 0815     10/17/2018 0815    CO2 28 10/17/2018 0815    BUN 22 10/17/2018 0815    CREATININE 1.0 10/17/2018 0815    GLU 85 10/17/2018 0815         Component Value Date/Time    CALCIUM 9.8 10/17/2018 0815    ALKPHOS 49 (L) 10/17/2018 0815    AST 17 10/17/2018 0815    ALT 9 (L) 10/17/2018 0815    BILITOT 0.6 10/17/2018 0815    ESTGFRAFRICA >60.0 10/17/2018 0815    EGFRNONAA 54.9 (A) 10/17/2018 0815          No results found for: LABA1C, HGBA1C  Lab Results   Component Value Date    TSH 1.981 10/17/2018     No results found for: INR, PROTIME  Lab Results   Component Value Date    WBC 5.45 10/17/2018    HGB 13.0 10/17/2018    HCT 40.9 10/17/2018    MCV 98 10/17/2018     10/17/2018     BMP  Sodium   Date Value Ref Range Status   10/17/2018 142 136 - 145 mmol/L Final     Potassium   Date Value Ref Range Status   10/17/2018 4.8 3.5 - 5.1 mmol/L Final     Chloride   Date Value Ref Range Status   10/17/2018 106 95 - 110 mmol/L Final     CO2   Date Value Ref Range Status   10/17/2018 28 23 - 29 mmol/L Final     BUN, Bld   Date Value Ref Range Status   10/17/2018 22 8 - 23 mg/dL Final     Creatinine   Date Value Ref Range Status   10/17/2018 1.0 0.5 - 1.4 mg/dL Final     Calcium   Date Value Ref Range Status   10/17/2018 9.8 8.7 - 10.5 mg/dL Final     Anion Gap   Date Value Ref Range Status   10/17/2018 8 8 - 16 mmol/L Final     eGFR if    Date Value Ref Range Status   10/17/2018 >60.0 >60 mL/min/1.73 m^2 Final     eGFR if non    Date Value Ref Range Status   10/17/2018 54.9 (A) >60 mL/min/1.73 m^2 Final     Comment:     Calculation used to obtain the estimated glomerular filtration  rate (eGFR) is the CKD-EPI equation.        BNP  @LABRCNTIP(BNP,BNPTRIAGEBLO)@  @LABRCNTIP(troponini)@  CrCl cannot be calculated (Patient's most recent lab result is older than the maximum 7 days allowed.).  No results found in the last 24 hours.  No results found in the last 24 hours.  No results found in the last 24 hours.    Assessment:      1. PAF (paroxysmal atrial fibrillation)    2. Essential hypertension    3. Hyperlipidemia, unspecified  hyperlipidemia type    4. Palpitation      Palpitation better  Low BP    Plan:   D/c acei  Continue metoprolol and Eliquis  Check BP at home  RTC in 6 months

## 2018-11-19 ENCOUNTER — TELEPHONE (OUTPATIENT)
Dept: CARDIOLOGY | Facility: CLINIC | Age: 78
End: 2018-11-19

## 2018-11-19 DIAGNOSIS — I10 ESSENTIAL HYPERTENSION: Primary | ICD-10-CM

## 2018-11-19 RX ORDER — RAMIPRIL 2.5 MG/1
2.5 CAPSULE ORAL DAILY
Qty: 90 CAPSULE | Refills: 3 | Status: SHIPPED | OUTPATIENT
Start: 2018-11-19 | End: 2019-11-03 | Stop reason: SDUPTHER

## 2018-11-19 NOTE — TELEPHONE ENCOUNTER
Spoke with pt who states her BP is climbing.  Pt was taken off Ramipril because she states her diastolic was low.  Pts current systolic is averaging 150 and diastolic is between 70 to 84.  Will ask dr. Cueva to advise.     ----- Message from Minnie Davenport sent at 11/19/2018 10:43 AM CST -----  states that her b/p has been going up since being off meds effective 11/15..[phone]

## 2018-11-20 ENCOUNTER — TELEPHONE (OUTPATIENT)
Dept: CARDIOLOGY | Facility: CLINIC | Age: 78
End: 2018-11-20

## 2018-12-03 DIAGNOSIS — M85.80 OSTEOPENIA, UNSPECIFIED LOCATION: ICD-10-CM

## 2018-12-03 RX ORDER — ALENDRONATE SODIUM 70 MG/1
70 TABLET ORAL
Qty: 12 TABLET | Refills: 3 | Status: SHIPPED | OUTPATIENT
Start: 2018-12-03 | End: 2019-10-24 | Stop reason: SDUPTHER

## 2018-12-10 ENCOUNTER — TELEPHONE (OUTPATIENT)
Dept: INTERNAL MEDICINE | Facility: CLINIC | Age: 78
End: 2018-12-10

## 2018-12-10 NOTE — TELEPHONE ENCOUNTER
Pt states she needs to talk to someone about getting depression medication. I informed pt that she could come in to see Dr. Gudino, pt verbalized understanding and scheduled appt.

## 2018-12-10 NOTE — TELEPHONE ENCOUNTER
----- Message from Munira Whitfield sent at 12/10/2018  9:23 AM CST -----  Patient would like to consult with someone rg depression..[phone]

## 2018-12-12 ENCOUNTER — TELEPHONE (OUTPATIENT)
Dept: ENDOSCOPY | Facility: HOSPITAL | Age: 78
End: 2018-12-12

## 2019-01-08 DIAGNOSIS — B00.9 HSV-2 INFECTION: ICD-10-CM

## 2019-01-08 RX ORDER — FAMCICLOVIR 500 MG/1
1000 TABLET ORAL 2 TIMES DAILY
Qty: 8 TABLET | Refills: 2 | Status: SHIPPED | OUTPATIENT
Start: 2019-01-08 | End: 2019-01-09

## 2019-02-11 RX ORDER — APIXABAN 5 MG/1
TABLET, FILM COATED ORAL
Qty: 60 TABLET | Refills: 5 | Status: SHIPPED | OUTPATIENT
Start: 2019-02-11 | End: 2019-08-06 | Stop reason: SDUPTHER

## 2019-02-13 DIAGNOSIS — F41.9 ANXIETY: ICD-10-CM

## 2019-02-13 RX ORDER — TRAZODONE HYDROCHLORIDE 50 MG/1
50 TABLET ORAL NIGHTLY
Qty: 90 TABLET | Refills: 1 | Status: SHIPPED | OUTPATIENT
Start: 2019-02-13 | End: 2019-08-04 | Stop reason: SDUPTHER

## 2019-04-18 ENCOUNTER — TELEPHONE (OUTPATIENT)
Dept: INTERNAL MEDICINE | Facility: CLINIC | Age: 79
End: 2019-04-18

## 2019-04-18 DIAGNOSIS — F41.9 ANXIETY: ICD-10-CM

## 2019-04-18 DIAGNOSIS — F32.A DEPRESSION, UNSPECIFIED DEPRESSION TYPE: ICD-10-CM

## 2019-04-18 RX ORDER — ESCITALOPRAM OXALATE 20 MG/1
20 TABLET ORAL DAILY
Qty: 90 TABLET | Refills: 0 | Status: SHIPPED | OUTPATIENT
Start: 2019-04-18 | End: 2019-04-25 | Stop reason: SDUPTHER

## 2019-04-18 NOTE — TELEPHONE ENCOUNTER
Pt stated that she tried to pick her lexapro up and the pharmacy told her that it is too soon to fill the medication. I informed her that she would have to contact her insurance for this problem. She verbalized understanding.

## 2019-04-18 NOTE — TELEPHONE ENCOUNTER
----- Message from Diya Jerod sent at 4/18/2019  9:41 AM CDT -----  Contact: Patient  Type:  RX Refill Request    Who Called: Nerissa  Refill or New Rx: Refill  RX Name and Strength: Lexapro 20 mg  How is the patient currently taking it? (ex. 1XDay): 1x day  Is this a 30 day or 90 day RX: 90  Preferred Pharmacy with phone number:   [facility]/pharm[facility] #[id number] - [place] [address]  [address]  [address]  [place] [place] [address]  Phone: [phone] Fax: [phone]  Local or Mail Order: Local  Ordering Provider: Dr. Gudino  Would the patient rather a call back or a response via [facility]? Call back   Best Call Back Number: Please call her at [phone]  Additional Information: n/a

## 2019-04-25 ENCOUNTER — OFFICE VISIT (OUTPATIENT)
Dept: INTERNAL MEDICINE | Facility: CLINIC | Age: 79
End: 2019-04-25
Payer: MEDICARE

## 2019-04-25 VITALS
OXYGEN SATURATION: 98 % | DIASTOLIC BLOOD PRESSURE: 62 MMHG | HEIGHT: 65 IN | SYSTOLIC BLOOD PRESSURE: 116 MMHG | HEART RATE: 59 BPM | TEMPERATURE: 98 F | WEIGHT: 135.38 LBS | BODY MASS INDEX: 22.56 KG/M2

## 2019-04-25 DIAGNOSIS — B00.9 HSV-2 INFECTION: ICD-10-CM

## 2019-04-25 DIAGNOSIS — M89.9 BONE DISORDER: ICD-10-CM

## 2019-04-25 DIAGNOSIS — I48.0 PAF (PAROXYSMAL ATRIAL FIBRILLATION): ICD-10-CM

## 2019-04-25 DIAGNOSIS — F32.A DEPRESSION, UNSPECIFIED DEPRESSION TYPE: ICD-10-CM

## 2019-04-25 DIAGNOSIS — I10 ESSENTIAL HYPERTENSION: Primary | ICD-10-CM

## 2019-04-25 DIAGNOSIS — M85.80 OSTEOPENIA, UNSPECIFIED LOCATION: ICD-10-CM

## 2019-04-25 DIAGNOSIS — E78.5 HYPERLIPIDEMIA, UNSPECIFIED HYPERLIPIDEMIA TYPE: ICD-10-CM

## 2019-04-25 DIAGNOSIS — F41.9 ANXIETY: ICD-10-CM

## 2019-04-25 PROCEDURE — 99214 PR OFFICE/OUTPT VISIT, EST, LEVL IV, 30-39 MIN: ICD-10-PCS | Mod: S$PBB,,, | Performed by: INTERNAL MEDICINE

## 2019-04-25 PROCEDURE — 99999 PR PBB SHADOW E&M-EST. PATIENT-LVL III: CPT | Mod: PBBFAC,,, | Performed by: INTERNAL MEDICINE

## 2019-04-25 PROCEDURE — 99213 OFFICE O/P EST LOW 20 MIN: CPT | Mod: PBBFAC,PN | Performed by: INTERNAL MEDICINE

## 2019-04-25 PROCEDURE — 99214 OFFICE O/P EST MOD 30 MIN: CPT | Mod: S$PBB,,, | Performed by: INTERNAL MEDICINE

## 2019-04-25 PROCEDURE — 99999 PR PBB SHADOW E&M-EST. PATIENT-LVL III: ICD-10-PCS | Mod: PBBFAC,,, | Performed by: INTERNAL MEDICINE

## 2019-04-25 RX ORDER — ESCITALOPRAM OXALATE 20 MG/1
20 TABLET ORAL DAILY
Qty: 90 TABLET | Refills: 2 | Status: SHIPPED | OUTPATIENT
Start: 2019-04-25 | End: 2019-10-24 | Stop reason: SDUPTHER

## 2019-04-25 RX ORDER — VALACYCLOVIR HYDROCHLORIDE 500 MG/1
500 TABLET, FILM COATED ORAL 2 TIMES DAILY
Qty: 90 TABLET | Refills: 2 | Status: SHIPPED | OUTPATIENT
Start: 2019-04-25 | End: 2019-05-14

## 2019-04-25 RX ORDER — ATORVASTATIN CALCIUM 10 MG/1
10 TABLET, FILM COATED ORAL DAILY
Qty: 90 TABLET | Refills: 2 | Status: SHIPPED | OUTPATIENT
Start: 2019-04-25 | End: 2019-10-21 | Stop reason: SDUPTHER

## 2019-04-25 NOTE — PROGRESS NOTES
"Subjective:      Patient ID: Nerissa Burnham is a 79 y.o. female.    Chief Complaint: Follow-up (6mo )    HPI   80 yo with   Patient Active Problem List   Diagnosis    Hypertension    Hyperlipidemia    Depression    Osteopenia    HSV-2 infection    Anxiety    GERD (gastroesophageal reflux disease)    Palpitation    PAF (paroxysmal atrial fibrillation)     Past Medical History:   Diagnosis Date    Depression     Hyperlipidemia     Hypertension      Here today for management of mult med problems as outlined below.  Compliant with meds without significant side effects.   Feels having hsv flare about every 2 months for about one year. Pain resolves in one day.     Review of Systems   Constitutional: Negative for chills and fever.   HENT: Negative for ear pain and sore throat.    Respiratory: Negative for cough.    Cardiovascular: Negative for chest pain.   Gastrointestinal: Negative for abdominal pain and blood in stool.   Genitourinary: Negative for dysuria and hematuria.   Neurological: Negative for seizures and syncope.     Objective:   /62 (BP Location: Right arm, Patient Position: Sitting, BP Method: Medium (Manual))   Pulse (!) 59   Temp 97.8 °F (36.6 °C) (Tympanic)   Ht 5' 4.5" (1.638 m)   Wt 61.4 kg (135 lb 5.8 oz)   SpO2 98%   BMI 22.88 kg/m²     Physical Exam   Constitutional: She is oriented to person, place, and time. She appears well-developed and well-nourished. No distress.   HENT:   Head: Normocephalic and atraumatic.   Mouth/Throat: Oropharynx is clear and moist.   Eyes: Pupils are equal, round, and reactive to light. EOM are normal.   Neck: Neck supple. No thyromegaly present.   Cardiovascular: Normal rate and regular rhythm.   Pulmonary/Chest: Breath sounds normal. She has no wheezes. She has no rales.   Abdominal: Soft. Bowel sounds are normal. There is no tenderness.   Musculoskeletal: She exhibits no edema.   Lymphadenopathy:     She has no cervical adenopathy.   Neurological: " She is alert and oriented to person, place, and time.   Skin: Skin is warm and dry.   Psychiatric: She has a normal mood and affect. Her behavior is normal.       Assessment:     1. Essential hypertension    2. Hyperlipidemia, unspecified hyperlipidemia type    3. HSV-2 infection    4. Depression, unspecified depression type    5. Anxiety    6. PAF (paroxysmal atrial fibrillation)    7. Osteopenia, unspecified location    8. Bone disorder      Plan:   Essential hypertension  Stable    Hyperlipidemia, unspecified hyperlipidemia type  controlled  -     atorvastatin (LIPITOR) 10 MG tablet; Take 1 tablet (10 mg total) by mouth once daily.  Dispense: 90 tablet; Refill: 2    HSV-2 infection  Frequent outbreaks  Try suppressive therapy  -     valACYclovir (VALTREX) 500 MG tablet; Take 1 tablet (500 mg total) by mouth 2 (two) times daily.  Dispense: 90 tablet; Refill: 2    Depression, unspecified depression type  stable  -     escitalopram oxalate (LEXAPRO) 20 MG tablet; Take 1 tablet (20 mg total) by mouth once daily.  Dispense: 90 tablet; Refill: 2    Anxiety  stable  -     escitalopram oxalate (LEXAPRO) 20 MG tablet; Take 1 tablet (20 mg total) by mouth once daily.  Dispense: 90 tablet; Refill: 2    PAF (paroxysmal atrial fibrillation)  Rate controlled  Osteopenia, unspecified location    Bone disorder  -     DXA Bone Density Spine And Hip; Future; Expected date: 10/25/2019        Lab Frequency Next Occurrence   Stool Exam-Ova,Cysts,Parasites Once 09/04/2018   Stool culture Once 09/04/2018   Giardia / Cryptosporidum, EIA Once 09/04/2018   WBC, Stool Once 09/04/2018   Pancreatic elastase, fecal Once 09/04/2018   Fecal fat, qualitative Once 09/04/2018       Problem List Items Addressed This Visit        Psychiatric    Depression    Relevant Medications    escitalopram oxalate (LEXAPRO) 20 MG tablet    Anxiety    Relevant Medications    escitalopram oxalate (LEXAPRO) 20 MG tablet       Cardiac/Vascular    Hypertension -  Primary    Hyperlipidemia    Relevant Medications    atorvastatin (LIPITOR) 10 MG tablet    PAF (paroxysmal atrial fibrillation)       ID    HSV-2 infection    Relevant Medications    valACYclovir (VALTREX) 500 MG tablet       Orthopedic    Osteopenia    Current Assessment & Plan     Started fosamax 9/2017. dxa due end of 2019.            Other Visit Diagnoses     Bone disorder        Relevant Orders    DXA Bone Density Spine And Hip          Follow up in about 6 months (around 10/25/2019), or if symptoms worsen or fail to improve.

## 2019-04-25 NOTE — PATIENT INSTRUCTIONS
Start valtrex 1000mg daily for 5 days now, then decrease to 500mg daily.  If rash flares then increase valtrex to 1000mg daily for 5 days and notify doctor Shahab.

## 2019-05-09 DIAGNOSIS — I48.0 PAF (PAROXYSMAL ATRIAL FIBRILLATION): Primary | ICD-10-CM

## 2019-05-14 ENCOUNTER — CLINICAL SUPPORT (OUTPATIENT)
Dept: CARDIOLOGY | Facility: CLINIC | Age: 79
End: 2019-05-14
Payer: MEDICARE

## 2019-05-14 ENCOUNTER — OFFICE VISIT (OUTPATIENT)
Dept: CARDIOLOGY | Facility: CLINIC | Age: 79
End: 2019-05-14
Payer: MEDICARE

## 2019-05-14 VITALS
BODY MASS INDEX: 22.88 KG/M2 | HEART RATE: 57 BPM | HEIGHT: 64 IN | DIASTOLIC BLOOD PRESSURE: 66 MMHG | WEIGHT: 134 LBS | SYSTOLIC BLOOD PRESSURE: 118 MMHG

## 2019-05-14 DIAGNOSIS — I10 ESSENTIAL HYPERTENSION: ICD-10-CM

## 2019-05-14 DIAGNOSIS — I48.0 PAF (PAROXYSMAL ATRIAL FIBRILLATION): Primary | ICD-10-CM

## 2019-05-14 DIAGNOSIS — E78.5 HYPERLIPIDEMIA, UNSPECIFIED HYPERLIPIDEMIA TYPE: ICD-10-CM

## 2019-05-14 DIAGNOSIS — I48.0 PAF (PAROXYSMAL ATRIAL FIBRILLATION): ICD-10-CM

## 2019-05-14 DIAGNOSIS — F41.9 ANXIETY: ICD-10-CM

## 2019-05-14 PROCEDURE — 93010 ELECTROCARDIOGRAM REPORT: CPT | Mod: S$PBB,,, | Performed by: INTERNAL MEDICINE

## 2019-05-14 PROCEDURE — 93010 EKG 12-LEAD: ICD-10-PCS | Mod: S$PBB,,, | Performed by: INTERNAL MEDICINE

## 2019-05-14 PROCEDURE — 99999 PR PBB SHADOW E&M-EST. PATIENT-LVL III: ICD-10-PCS | Mod: PBBFAC,,, | Performed by: INTERNAL MEDICINE

## 2019-05-14 PROCEDURE — 99214 PR OFFICE/OUTPT VISIT, EST, LEVL IV, 30-39 MIN: ICD-10-PCS | Mod: S$PBB,,, | Performed by: INTERNAL MEDICINE

## 2019-05-14 PROCEDURE — 99214 OFFICE O/P EST MOD 30 MIN: CPT | Mod: S$PBB,,, | Performed by: INTERNAL MEDICINE

## 2019-05-14 PROCEDURE — 99213 OFFICE O/P EST LOW 20 MIN: CPT | Mod: PBBFAC,25 | Performed by: INTERNAL MEDICINE

## 2019-05-14 PROCEDURE — 99999 PR PBB SHADOW E&M-EST. PATIENT-LVL III: CPT | Mod: PBBFAC,,, | Performed by: INTERNAL MEDICINE

## 2019-05-14 PROCEDURE — 93005 ELECTROCARDIOGRAM TRACING: CPT | Mod: PBBFAC | Performed by: INTERNAL MEDICINE

## 2019-05-14 NOTE — PROGRESS NOTES
Subjective:   Patient ID:  Nerissa Burnham is a 79 y.o. female who presents for follow up of Hypertension (follow up)      79 yo female 6 months f/u  PMH PAF, HTN, HLD and depression.  No smoking.  passed away in . Since then, the palpitation seems worse.    ECHo showed normal EF, mild AI, LAE and garde I DD  holter showed PAF 7% burden in .   States since she decreased drinking, her palpitation improved. Now two glasses wine a day      Past Medical History:   Diagnosis Date    Depression     Hyperlipidemia     Hypertension        Past Surgical History:   Procedure Laterality Date    BREAST SURGERY Bilateral     Augmentation    HYSTERECTOMY      OOPHORECTOMY         Social History     Tobacco Use    Smoking status: Never Smoker    Smokeless tobacco: Never Used   Substance Use Topics    Alcohol use: Yes     Alcohol/week: 8.4 oz     Types: 14 Glasses of wine per week    Drug use: No       Family History   Problem Relation Age of Onset    Cancer Mother         Breast    Breast cancer Mother     No Known Problems Father     Breast cancer Maternal Aunt          Review of Systems   Constitution: Negative for decreased appetite, diaphoresis, fever, malaise/fatigue and night sweats.   HENT: Negative for nosebleeds.    Eyes: Negative for blurred vision and double vision.   Cardiovascular: Positive for palpitations. Negative for chest pain, claudication, dyspnea on exertion, irregular heartbeat, leg swelling, near-syncope, orthopnea, paroxysmal nocturnal dyspnea and syncope.   Respiratory: Negative for cough, shortness of breath, sleep disturbances due to breathing, snoring, sputum production and wheezing.    Endocrine: Negative for cold intolerance and polyuria.   Hematologic/Lymphatic: Does not bruise/bleed easily.   Skin: Negative for rash.   Musculoskeletal: Negative for back pain, falls, joint pain, joint swelling and neck pain.   Gastrointestinal: Negative for abdominal pain,  heartburn, nausea and vomiting.   Genitourinary: Negative for dysuria, frequency and hematuria.   Neurological: Negative for difficulty with concentration, dizziness, focal weakness, headaches, light-headedness, numbness, seizures and weakness.   Psychiatric/Behavioral: Negative for depression, memory loss and substance abuse. The patient does not have insomnia.    Allergic/Immunologic: Negative for HIV exposure and hives.       Objective:   Physical Exam   Constitutional: She is oriented to person, place, and time. She appears well-nourished.   HENT:   Head: Normocephalic.   Eyes: Pupils are equal, round, and reactive to light.   Neck: Normal carotid pulses and no JVD present. Carotid bruit is not present. No thyromegaly present.   Cardiovascular: Normal rate, regular rhythm and normal pulses.  No extrasystoles are present. PMI is not displaced. Exam reveals no gallop and no S3.   Murmur heard.  1/6 ESM on URSB   Pulmonary/Chest: Breath sounds normal. No stridor. No respiratory distress.   Wheezing on right   Abdominal: Soft. Bowel sounds are normal. There is no tenderness. There is no rebound.   Musculoskeletal: Normal range of motion.   Neurological: She is alert and oriented to person, place, and time.   Skin: Skin is intact. No rash noted.   Psychiatric: Her behavior is normal.       Lab Results   Component Value Date    CHOL 181 10/17/2018     Lab Results   Component Value Date    HDL 74 10/17/2018     Lab Results   Component Value Date    LDLCALC 91.8 10/17/2018     Lab Results   Component Value Date    TRIG 76 10/17/2018     Lab Results   Component Value Date    CHOLHDL 40.9 10/17/2018       Chemistry        Component Value Date/Time     10/17/2018 0815    K 4.8 10/17/2018 0815     10/17/2018 0815    CO2 28 10/17/2018 0815    BUN 22 10/17/2018 0815    CREATININE 1.0 10/17/2018 0815    GLU 85 10/17/2018 0815        Component Value Date/Time    CALCIUM 9.8 10/17/2018 0815    ALKPHOS 49 (L)  10/17/2018 0815    AST 17 10/17/2018 0815    ALT 9 (L) 10/17/2018 0815    BILITOT 0.6 10/17/2018 0815    ESTGFRAFRICA >60.0 10/17/2018 0815    EGFRNONAA 54.9 (A) 10/17/2018 0815          No results found for: LABA1C, HGBA1C  Lab Results   Component Value Date    TSH 1.981 10/17/2018     No results found for: INR, PROTIME  Lab Results   Component Value Date    WBC 5.45 10/17/2018    HGB 13.0 10/17/2018    HCT 40.9 10/17/2018    MCV 98 10/17/2018     10/17/2018     BMP  Sodium   Date Value Ref Range Status   10/17/2018 142 136 - 145 mmol/L Final     Potassium   Date Value Ref Range Status   10/17/2018 4.8 3.5 - 5.1 mmol/L Final     Chloride   Date Value Ref Range Status   10/17/2018 106 95 - 110 mmol/L Final     CO2   Date Value Ref Range Status   10/17/2018 28 23 - 29 mmol/L Final     BUN, Bld   Date Value Ref Range Status   10/17/2018 22 8 - 23 mg/dL Final     Creatinine   Date Value Ref Range Status   10/17/2018 1.0 0.5 - 1.4 mg/dL Final     Calcium   Date Value Ref Range Status   10/17/2018 9.8 8.7 - 10.5 mg/dL Final     Anion Gap   Date Value Ref Range Status   10/17/2018 8 8 - 16 mmol/L Final     eGFR if    Date Value Ref Range Status   10/17/2018 >60.0 >60 mL/min/1.73 m^2 Final     eGFR if non    Date Value Ref Range Status   10/17/2018 54.9 (A) >60 mL/min/1.73 m^2 Final     Comment:     Calculation used to obtain the estimated glomerular filtration  rate (eGFR) is the CKD-EPI equation.        BNP  @LABRCNTIP(BNP,BNPTRIAGEBLO)@  @LABRCNTIP(troponini)@  CrCl cannot be calculated (Patient's most recent lab result is older than the maximum 7 days allowed.).  No results found in the last 24 hours.  No results found in the last 24 hours.  No results found in the last 24 hours.    Assessment:      1. PAF (paroxysmal atrial fibrillation)    2. Essential hypertension    3. Hyperlipidemia, unspecified hyperlipidemia type    4. Anxiety        Plan:   Continue Eliquis 5 mg bid,  metoprolol and ramipril. Also statin  Avoid caffeine and alcohol  Continue current meds.  Recommend heart-healthy diet, weight control and regular exercise.  Dian. Risk modification.   RTC in 6 months    I have reviewed all pertinent labs and cardiac studies. Plans and recommendations have been formulated under my direct supervision. All questions answered and patient voiced understanding. Patient to continue current medications.

## 2019-07-10 ENCOUNTER — TELEPHONE (OUTPATIENT)
Dept: CARDIOLOGY | Facility: CLINIC | Age: 79
End: 2019-07-10

## 2019-07-10 NOTE — TELEPHONE ENCOUNTER
Spoke with pt who stated she was bleeding rectally.  Pt stated that she has had spot bleeding for a few days, but has Hemorids and thought that may be it.  Yesterday, she bled for a few hours before it stopped.  Pt stated she didn't bleed this morning.  Pt stated Dr Cueva told her to call if she had any bleeding issues.  Will let Dr Cueva know and ask to advise.     ----- Message from Mel Newsome sent at 7/10/2019  9:51 AM CDT -----  Contact: pt   Call pt in regards to some bleeding that she is having.    [phone] (home)

## 2019-07-29 DIAGNOSIS — B00.9 HSV-2 INFECTION: ICD-10-CM

## 2019-07-29 RX ORDER — VALACYCLOVIR HYDROCHLORIDE 500 MG/1
500 TABLET, FILM COATED ORAL 2 TIMES DAILY
Qty: 90 TABLET | Refills: 2 | Status: SHIPPED | OUTPATIENT
Start: 2019-07-29 | End: 2019-10-24

## 2019-07-29 NOTE — TELEPHONE ENCOUNTER
----- Message from Vicki Tellez sent at 7/29/2019 10:20 AM CDT -----  Contact: self  Type:  RX Refill Request    Who Called: pt  Refill or New Rx:refill  RX Name and Strength:valacyclovir-500mg  How is the patient currently taking it? (ex. 1XDay):1xday  Is this a 30 day or 90 day RX:90  Preferred Pharmacy with phone number:  [facility]/pharmacy #[id number] - [place] - [address].  [address]  [address]  [facility] [place] [address]  Phone: [phone] Fax: [phone]  Local or Mail Order:local  Ordering Provider:stacie  Would the patient rather a call back or a response via [facility]? Call back  Best Call Back Number:call back  Additional Information: none    Thanks,  Vicki Tellez

## 2019-08-04 DIAGNOSIS — F41.9 ANXIETY: ICD-10-CM

## 2019-08-05 RX ORDER — TRAZODONE HYDROCHLORIDE 50 MG/1
50 TABLET ORAL NIGHTLY
Qty: 90 TABLET | Refills: 1 | Status: SHIPPED | OUTPATIENT
Start: 2019-08-05 | End: 2020-01-31

## 2019-08-07 RX ORDER — APIXABAN 5 MG/1
TABLET, FILM COATED ORAL
Qty: 60 TABLET | Refills: 5 | Status: SHIPPED | OUTPATIENT
Start: 2019-08-07 | End: 2020-01-15

## 2019-08-14 RX ORDER — METOPROLOL SUCCINATE 25 MG/1
TABLET, EXTENDED RELEASE ORAL
Qty: 90 TABLET | Refills: 3 | Status: SHIPPED | OUTPATIENT
Start: 2019-08-14 | End: 2020-11-12 | Stop reason: SDUPTHER

## 2019-10-17 ENCOUNTER — APPOINTMENT (OUTPATIENT)
Dept: RADIOLOGY | Facility: HOSPITAL | Age: 79
End: 2019-10-17
Attending: INTERNAL MEDICINE
Payer: MEDICARE

## 2019-10-17 DIAGNOSIS — M89.9 BONE DISORDER: ICD-10-CM

## 2019-10-17 PROCEDURE — 77080 DXA BONE DENSITY AXIAL: CPT | Mod: 26,,, | Performed by: RADIOLOGY

## 2019-10-17 PROCEDURE — 77080 DXA BONE DENSITY AXIAL: CPT | Mod: TC

## 2019-10-17 PROCEDURE — 77080 DEXA BONE DENSITY SPINE HIP: ICD-10-PCS | Mod: 26,,, | Performed by: RADIOLOGY

## 2019-10-21 DIAGNOSIS — E78.5 HYPERLIPIDEMIA, UNSPECIFIED HYPERLIPIDEMIA TYPE: ICD-10-CM

## 2019-10-21 RX ORDER — ATORVASTATIN CALCIUM 10 MG/1
TABLET, FILM COATED ORAL
Qty: 90 TABLET | Refills: 2 | Status: SHIPPED | OUTPATIENT
Start: 2019-10-21 | End: 2020-10-12

## 2019-10-24 ENCOUNTER — OFFICE VISIT (OUTPATIENT)
Dept: INTERNAL MEDICINE | Facility: CLINIC | Age: 79
End: 2019-10-24
Payer: MEDICARE

## 2019-10-24 VITALS
HEART RATE: 59 BPM | DIASTOLIC BLOOD PRESSURE: 62 MMHG | SYSTOLIC BLOOD PRESSURE: 130 MMHG | OXYGEN SATURATION: 98 % | WEIGHT: 133.63 LBS | TEMPERATURE: 97 F | BODY MASS INDEX: 22.93 KG/M2

## 2019-10-24 DIAGNOSIS — M85.80 OSTEOPENIA, UNSPECIFIED LOCATION: ICD-10-CM

## 2019-10-24 DIAGNOSIS — I10 ESSENTIAL HYPERTENSION: Primary | ICD-10-CM

## 2019-10-24 DIAGNOSIS — F32.A DEPRESSION, UNSPECIFIED DEPRESSION TYPE: ICD-10-CM

## 2019-10-24 DIAGNOSIS — E78.5 HYPERLIPIDEMIA, UNSPECIFIED HYPERLIPIDEMIA TYPE: ICD-10-CM

## 2019-10-24 DIAGNOSIS — I48.0 PAF (PAROXYSMAL ATRIAL FIBRILLATION): ICD-10-CM

## 2019-10-24 DIAGNOSIS — F41.9 ANXIETY: ICD-10-CM

## 2019-10-24 DIAGNOSIS — N39.41 URGE INCONTINENCE: ICD-10-CM

## 2019-10-24 DIAGNOSIS — K92.1 HEMATOCHEZIA: ICD-10-CM

## 2019-10-24 PROCEDURE — 99999 PR PBB SHADOW E&M-EST. PATIENT-LVL IV: CPT | Mod: PBBFAC,,, | Performed by: INTERNAL MEDICINE

## 2019-10-24 PROCEDURE — 99214 PR OFFICE/OUTPT VISIT, EST, LEVL IV, 30-39 MIN: ICD-10-PCS | Mod: S$PBB,,, | Performed by: INTERNAL MEDICINE

## 2019-10-24 PROCEDURE — 99214 OFFICE O/P EST MOD 30 MIN: CPT | Mod: PBBFAC | Performed by: INTERNAL MEDICINE

## 2019-10-24 PROCEDURE — 99214 OFFICE O/P EST MOD 30 MIN: CPT | Mod: S$PBB,,, | Performed by: INTERNAL MEDICINE

## 2019-10-24 PROCEDURE — 99999 PR PBB SHADOW E&M-EST. PATIENT-LVL IV: ICD-10-PCS | Mod: PBBFAC,,, | Performed by: INTERNAL MEDICINE

## 2019-10-24 RX ORDER — ESCITALOPRAM OXALATE 20 MG/1
20 TABLET ORAL DAILY
Qty: 90 TABLET | Refills: 2 | Status: SHIPPED | OUTPATIENT
Start: 2019-10-24 | End: 2020-10-30 | Stop reason: SDUPTHER

## 2019-10-24 RX ORDER — OXYBUTYNIN CHLORIDE 5 MG/1
5 TABLET, EXTENDED RELEASE ORAL DAILY
Qty: 90 TABLET | Refills: 0 | Status: SHIPPED | OUTPATIENT
Start: 2019-10-24 | End: 2020-01-11

## 2019-10-24 RX ORDER — ALENDRONATE SODIUM 70 MG/1
70 TABLET ORAL
Qty: 12 TABLET | Refills: 3 | Status: SHIPPED | OUTPATIENT
Start: 2019-10-24 | End: 2020-10-30 | Stop reason: SDUPTHER

## 2019-10-24 RX ORDER — FAMCICLOVIR 500 MG/1
1000 TABLET ORAL
COMMUNITY
End: 2020-10-30

## 2019-10-24 NOTE — PATIENT INSTRUCTIONS
Urinary Incontinence, Female (Adult)  Urinary incontinence means loss of control of the bladder. This problem affects many women, especially as they get older. If you have incontinence, you may be embarrassed to ask for help. But know that this problem can be treated.     Types of Incontinence  There are different types of incontinence. Two of the main types are described here. You can have more than one type.  Stress incontinence: With this type, urine leaks when pressure (stress) is put on the bladder. This may happen when you cough, sneeze, or laugh. Stress incontinence most often occurs because the pelvic floor muscles that support the bladder and urethra are weak. This can happen after pregnancy and vaginal childbirth or a hysterectomy. It can also be due to excess body weight or hormone changes.  Urge incontinence (also called overactive bladder): With this type, a sudden urge to urinate is felt often. This may happen even though there may not be much urine in the bladder. The need to urinate often during the night is common. Urge incontinence most often occurs because of bladder spasms. This may be due to bladder irritation or infection. Damage to bladder nerves or pelvic muscles, constipation, and certain medicines can also lead to urge incontinence.  Treatment of urinary incontinence depends on the cause. Further evaluation is needed to find the type you have. This will likely include an exam and certain tests. Based on the results, you and your healthcare provider can then plan treatment. Until a diagnosis is made, the home care tips below can help relieve symptoms.  Home care  · Do pelvic floor muscle (Kegel) exercises, if they are prescribed. The pelvic floor muscles help support the bladder and urethra. Many women find that their symptoms improve when doing special exercises that strengthen these muscles. To do the exercises:  ¨ Contract the muscles you would use to stop your stream of urine, but do  this when youre not urinating. Hold for 10 seconds, then relax. Repeat 10 to 20 times in a row, at least 3 times a day. Your provider may give you other instructions for how to do the exercises and how often.  · Keep a bladder diary. This helps track how often and how much you urinate over a set period of time. Bring this diary with you to your next visit with the provider. The information can help your provider learn more about your bladder problem.  · Lose weight, if advised to by your provider. Excess weight puts pressure on the bladder. Your provider can help you create a weight-loss plan thats right for you. This may include exercising more and making certain diet changes.  · Avoid foods and drinks that may irritate the bladder. These can include alcohol and caffeinated drinks.  · Quit smoking. Smoking and other tobacco use can lead to chronic cough that strains the pelvic floor muscles. Smoking may also damage the bladder and urethra. Talk with your provider about treatments or methods you can use to quit smoking.  · If drinking large amounts of fluid causes you to have symptoms, you may be advised to limit your fluid intake. You may also be advised to drink most of your fluids during the day and to limit fluids at night.  · If youre worried about urine leakage or accidents, you may wear absorbent pads to catch urine. Change the pads often. This helps reduce discomfort. It may also reduce the risk of skin or bladder infections.  Follow-up care  Follow up with your healthcare provider as directed. If testing was done, youll be told the results as soon as they are ready. It may take some to find the right treatment for your problem. Work closely with your provider to ensure you get the best care for your needs. Your treatment plan may include special therapies or medicines. Certain procedures or surgery may also be options. Be sure to discuss any questions you have with your provider.  When to seek medical  advice  Call the healthcare provider right away if any of these occur:  · Fever of 100.4°F (38°C) or higher, or as directed by your provider  · Bladder pain or fullness  · Abdominal swelling  · Nausea or vomiting  · Back pain  · Weakness, dizziness or fainting  Date Last Reviewed: 7/26/2015  © 0328-7160 [employer]. [address] [place] [address], [place] [address]. All rights reserved. This information is not intended as a substitute for professional medical care. Always follow your healthcare professional's instructions.        Treating Incontinence in Women: Nonsurgical Methods    The best treatment for you will depend on the type of incontinence you have. Your symptoms, age, and any underlying problems that are found also affect your treatment. While some types of incontinence may eventually require surgery, nonsurgical treatments may be effective in many cases. Nonsurgical treatments include lifestyle changes, muscle-strengthening exercises, and medicines.  Nonsurgical Treatments  Treatment for stress urinary incontinence includes:  · Bladder training  · Lifestyle changes such as weight loss and increased activity if incontinence is due to being overweight  · Medicines, if bladder training has not helped  · Pelvic floor muscle exercises  Lifestyle changes  · Losing weight. Excess weight puts extra pressure on the pelvic floor muscles. Exercising and eating right can help you lose weight. This helps other treatments work better.  · Making certain diet changes. Some foods may make you need to urinate more, so it may be good to avoid them. These include caffeinated drinks and alcohol. Ask your healthcare provider whether these or other diet changes might be helpful.  · Quitting smoking. Smoking can lead to a chronic cough that strains pelvic floor muscles. Smoking may also damage the bladder and urethra.  Pelvic floor musle exercises  There are exercises you can do to help strengthen your pelvic floor  muscles. The pelvic floor muscles act as a sling to help hold the bladder and urethra in place. These muscles also help keep the urethra closed. Weak pelvic floor muscles may allow urine to leak. To strengthen the pelvic floor muscles, do the exercises daily. In a few months, the muscles will be stronger and tighter. This can help prevent urine leakage.  Date Last Reviewed: 1/1/2017  © 4911-4403 [employer]. [address] [place] [address], [place] [address]. All rights reserved. This information is not intended as a substitute for professional medical care. Always follow your healthcare professional's instructions.

## 2019-10-24 NOTE — PROGRESS NOTES
Subjective:      Patient ID: Nerissa Burnham is a 79 y.o. female.    Chief Complaint: Follow-up    HPI   78 yo with   Patient Active Problem List   Diagnosis    Hypertension    Hyperlipidemia    Depression    Osteopenia    HSV-2 infection    Anxiety    GERD (gastroesophageal reflux disease)    Palpitation    PAF (paroxysmal atrial fibrillation)     Past Medical History:   Diagnosis Date    Depression     Hyperlipidemia     Hypertension      Here today for management of multiple medical problems as outlined below.  She also reports new complaint urge incontinence.  This has been occurring for approximately 1-2 months.  Nocturia approximately once a nightly.  No other associated symptoms.  Review of Systems   Constitutional: Negative for activity change and unexpected weight change.   HENT: Negative for hearing loss, rhinorrhea and trouble swallowing.    Eyes: Negative for discharge and visual disturbance.   Respiratory: Negative for chest tightness and wheezing.    Cardiovascular: Negative for chest pain and palpitations.   Gastrointestinal: Positive for blood in stool and diarrhea. Negative for constipation and vomiting.   Endocrine: Negative for polydipsia and polyuria.   Genitourinary: Positive for urgency. Negative for decreased urine volume, difficulty urinating, dysuria, enuresis, flank pain, frequency, hematuria, menstrual problem, pelvic pain, vaginal bleeding, vaginal discharge and vaginal pain.   Musculoskeletal: Negative for arthralgias, joint swelling and neck pain.   Neurological: Negative for weakness and headaches.   Psychiatric/Behavioral: Negative for confusion and dysphoric mood. The patient is not nervous/anxious.      Objective:   /62 (BP Location: Left arm, Patient Position: Sitting, BP Method: Medium (Manual))   Pulse (!) 59   Temp 97.4 °F (36.3 °C) (Tympanic)   Wt 60.6 kg (133 lb 9.6 oz)   SpO2 98%   BMI 22.93 kg/m²     Physical Exam   Constitutional: She is oriented to  person, place, and time. She appears well-developed and well-nourished. No distress.   HENT:   Head: Normocephalic and atraumatic.   Mouth/Throat: Oropharynx is clear and moist.   Eyes: Pupils are equal, round, and reactive to light. EOM are normal.   Neck: Neck supple. No thyromegaly present.   Cardiovascular: Normal rate and regular rhythm.   Pulmonary/Chest: Breath sounds normal. She has no wheezes. She has no rales.   Abdominal: Soft. Bowel sounds are normal. There is no tenderness.   Musculoskeletal: She exhibits no edema.   Lymphadenopathy:     She has no cervical adenopathy.   Neurological: She is alert and oriented to person, place, and time.   Skin: Skin is warm and dry.   Psychiatric: She has a normal mood and affect. Her behavior is normal.       Assessment:     1. Essential hypertension    2. Hyperlipidemia, unspecified hyperlipidemia type    3. Depression, unspecified depression type    4. Anxiety    5. PAF (paroxysmal atrial fibrillation)    6. Osteopenia, unspecified location    7. Hematochezia    8. Urge incontinence      Plan:   Essential hypertension  Controlled.  Continue current medications  -     Comprehensive metabolic panel; Future; Expected date: 10/24/2019  -     CBC auto differential; Future; Expected date: 10/24/2019    Hyperlipidemia, unspecified hyperlipidemia type  Controlled.  Continue current medications  -     TSH; Future; Expected date: 10/24/2019  -     Lipid panel; Future; Expected date: 10/24/2019    Depression, unspecified depression type  Stable.  Continue current medication  -     escitalopram oxalate (LEXAPRO) 20 MG tablet; Take 1 tablet (20 mg total) by mouth once daily.  Dispense: 90 tablet; Refill: 2    Anxiety  Stable.  Continue current medications  -     escitalopram oxalate (LEXAPRO) 20 MG tablet; Take 1 tablet (20 mg total) by mouth once daily.  Dispense: 90 tablet; Refill: 2    PAF (paroxysmal atrial fibrillation)  Rate controlled.  Up-to-date with cards  Osteopenia,  unspecified location  Improved after 2 years Fosamax.  Continues with high fracture risk.  Continue Fosamax for now.  -     Vitamin D; Future; Expected date: 10/24/2019  -     alendronate (FOSAMAX) 70 MG tablet; Take 1 tablet (70 mg total) by mouth every 7 days.  Dispense: 12 tablet; Refill: 3    Hematochezia  -     Ambulatory Referral to Gastroenterology    Urge incontinence  Bladder training discussed and patient information regarding urgent continence and bladder training/exercise is selective for patient.  -     oxybutynin (DITROPAN-XL) 5 MG TR24; Take 1 tablet (5 mg total) by mouth once daily.  Dispense: 90 tablet; Refill: 0  -     Urinalysis; Future; Expected date: 10/24/2019        Lab Frequency Next Occurrence   Giardia / Cryptosporidum, EIA Once 09/04/2018   Pancreatic elastase, fecal Once 09/04/2018   Fecal fat, qualitative Once 09/04/2018   Comprehensive metabolic panel Once 10/24/2019   CBC auto differential Once 10/24/2019   TSH Once 10/24/2019   Lipid panel Once 10/24/2019   Vitamin D Once 10/24/2019   Urinalysis Once 10/24/2019       Problem List Items Addressed This Visit        Psychiatric    Depression    Relevant Medications    escitalopram oxalate (LEXAPRO) 20 MG tablet    Anxiety    Relevant Medications    escitalopram oxalate (LEXAPRO) 20 MG tablet       Cardiac/Vascular    Hypertension - Primary    Relevant Orders    Comprehensive metabolic panel    CBC auto differential    Hyperlipidemia    Relevant Orders    TSH    Lipid panel    PAF (paroxysmal atrial fibrillation)       Orthopedic    Osteopenia    Relevant Medications    alendronate (FOSAMAX) 70 MG tablet    Other Relevant Orders    Vitamin D      Other Visit Diagnoses     Hematochezia        Relevant Orders    Ambulatory Referral to Gastroenterology    Urge incontinence        Relevant Medications    oxybutynin (DITROPAN-XL) 5 MG TR24    Other Relevant Orders    Urinalysis          Follow up in about 6 months (around 4/24/2020), or if  symptoms worsen or fail to improve.

## 2019-10-25 ENCOUNTER — LAB VISIT (OUTPATIENT)
Dept: LAB | Facility: HOSPITAL | Age: 79
End: 2019-10-25
Attending: INTERNAL MEDICINE
Payer: MEDICARE

## 2019-10-25 DIAGNOSIS — I10 ESSENTIAL HYPERTENSION: ICD-10-CM

## 2019-10-25 DIAGNOSIS — E78.5 HYPERLIPIDEMIA, UNSPECIFIED HYPERLIPIDEMIA TYPE: ICD-10-CM

## 2019-10-25 DIAGNOSIS — M85.80 OSTEOPENIA, UNSPECIFIED LOCATION: ICD-10-CM

## 2019-10-25 LAB
25(OH)D3+25(OH)D2 SERPL-MCNC: 44 NG/ML (ref 30–96)
ALBUMIN SERPL BCP-MCNC: 4 G/DL (ref 3.5–5.2)
ALP SERPL-CCNC: 40 U/L (ref 55–135)
ALT SERPL W/O P-5'-P-CCNC: 10 U/L (ref 10–44)
ANION GAP SERPL CALC-SCNC: 7 MMOL/L (ref 8–16)
AST SERPL-CCNC: 16 U/L (ref 10–40)
BASOPHILS # BLD AUTO: 0.04 K/UL (ref 0–0.2)
BASOPHILS NFR BLD: 0.8 % (ref 0–1.9)
BILIRUB SERPL-MCNC: 0.4 MG/DL (ref 0.1–1)
BUN SERPL-MCNC: 23 MG/DL (ref 8–23)
CALCIUM SERPL-MCNC: 9.6 MG/DL (ref 8.7–10.5)
CHLORIDE SERPL-SCNC: 109 MMOL/L (ref 95–110)
CHOLEST SERPL-MCNC: 155 MG/DL (ref 120–199)
CHOLEST/HDLC SERPL: 2.7 {RATIO} (ref 2–5)
CO2 SERPL-SCNC: 28 MMOL/L (ref 23–29)
CREAT SERPL-MCNC: 1 MG/DL (ref 0.5–1.4)
DIFFERENTIAL METHOD: ABNORMAL
EOSINOPHIL # BLD AUTO: 0.1 K/UL (ref 0–0.5)
EOSINOPHIL NFR BLD: 2.5 % (ref 0–8)
ERYTHROCYTE [DISTWIDTH] IN BLOOD BY AUTOMATED COUNT: 12.4 % (ref 11.5–14.5)
EST. GFR  (AFRICAN AMERICAN): >60 ML/MIN/1.73 M^2
EST. GFR  (NON AFRICAN AMERICAN): 54.5 ML/MIN/1.73 M^2
GLUCOSE SERPL-MCNC: 95 MG/DL (ref 70–110)
HCT VFR BLD AUTO: 38.8 % (ref 37–48.5)
HDLC SERPL-MCNC: 58 MG/DL (ref 40–75)
HDLC SERPL: 37.4 % (ref 20–50)
HGB BLD-MCNC: 12.6 G/DL (ref 12–16)
IMM GRANULOCYTES # BLD AUTO: 0.02 K/UL (ref 0–0.04)
IMM GRANULOCYTES NFR BLD AUTO: 0.4 % (ref 0–0.5)
LDLC SERPL CALC-MCNC: 86.2 MG/DL (ref 63–159)
LYMPHOCYTES # BLD AUTO: 1.2 K/UL (ref 1–4.8)
LYMPHOCYTES NFR BLD: 23.8 % (ref 18–48)
MCH RBC QN AUTO: 31.8 PG (ref 27–31)
MCHC RBC AUTO-ENTMCNC: 32.5 G/DL (ref 32–36)
MCV RBC AUTO: 98 FL (ref 82–98)
MONOCYTES # BLD AUTO: 0.6 K/UL (ref 0.3–1)
MONOCYTES NFR BLD: 11.6 % (ref 4–15)
NEUTROPHILS # BLD AUTO: 3.1 K/UL (ref 1.8–7.7)
NEUTROPHILS NFR BLD: 60.9 % (ref 38–73)
NONHDLC SERPL-MCNC: 97 MG/DL
NRBC BLD-RTO: 0 /100 WBC
PLATELET # BLD AUTO: 245 K/UL (ref 150–350)
PMV BLD AUTO: 11 FL (ref 9.2–12.9)
POTASSIUM SERPL-SCNC: 4.6 MMOL/L (ref 3.5–5.1)
PROT SERPL-MCNC: 6.3 G/DL (ref 6–8.4)
RBC # BLD AUTO: 3.96 M/UL (ref 4–5.4)
SODIUM SERPL-SCNC: 144 MMOL/L (ref 136–145)
TRIGL SERPL-MCNC: 54 MG/DL (ref 30–150)
TSH SERPL DL<=0.005 MIU/L-ACNC: 1.86 UIU/ML (ref 0.4–4)
WBC # BLD AUTO: 5.16 K/UL (ref 3.9–12.7)

## 2019-10-25 PROCEDURE — 84443 ASSAY THYROID STIM HORMONE: CPT

## 2019-10-25 PROCEDURE — 80061 LIPID PANEL: CPT

## 2019-10-25 PROCEDURE — 85025 COMPLETE CBC W/AUTO DIFF WBC: CPT

## 2019-10-25 PROCEDURE — 82306 VITAMIN D 25 HYDROXY: CPT

## 2019-10-25 PROCEDURE — 80053 COMPREHEN METABOLIC PANEL: CPT

## 2019-10-25 PROCEDURE — 36415 COLL VENOUS BLD VENIPUNCTURE: CPT

## 2019-10-29 ENCOUNTER — TELEPHONE (OUTPATIENT)
Dept: INTERNAL MEDICINE | Facility: CLINIC | Age: 79
End: 2019-10-29

## 2019-10-29 ENCOUNTER — PATIENT OUTREACH (OUTPATIENT)
Dept: ADMINISTRATIVE | Facility: HOSPITAL | Age: 79
End: 2019-10-29

## 2019-10-29 DIAGNOSIS — Z12.31 ENCOUNTER FOR SCREENING MAMMOGRAM FOR MALIGNANT NEOPLASM OF BREAST: Primary | ICD-10-CM

## 2019-10-29 NOTE — TELEPHONE ENCOUNTER
----- Message from Shannon Piña sent at 10/29/2019  1:25 PM CDT -----  Contact: Patient  .Type:  Mammogram    Caller is requesting to schedule their annual mammogram appointment.  Order is not listed in EPIC.  Please enter order and contact patient to schedule.  Name of Caller: Nerissa- Patient  Where would they like the mammogram performed?[facility] [place] [facility]  Would the patient rather a call back or a response via [facility]? .[phone] ([place])   Best Call Back Number:.[phone] (home)     Additional Information:

## 2019-10-31 ENCOUNTER — EXTERNAL CHRONIC CARE MANAGEMENT (OUTPATIENT)
Dept: PRIMARY CARE CLINIC | Facility: CLINIC | Age: 79
End: 2019-10-31
Payer: MEDICARE

## 2019-10-31 PROCEDURE — 99490 PR CHRONIC CARE MGMT, 1ST 20 MIN: ICD-10-PCS | Mod: S$PBB,,, | Performed by: INTERNAL MEDICINE

## 2019-10-31 PROCEDURE — 99490 CHRNC CARE MGMT STAFF 1ST 20: CPT | Mod: S$PBB,,, | Performed by: INTERNAL MEDICINE

## 2019-10-31 PROCEDURE — 99490 CHRNC CARE MGMT STAFF 1ST 20: CPT | Mod: PBBFAC | Performed by: INTERNAL MEDICINE

## 2019-11-03 RX ORDER — RAMIPRIL 2.5 MG/1
2.5 CAPSULE ORAL DAILY
Qty: 90 CAPSULE | Refills: 3 | Status: SHIPPED | OUTPATIENT
Start: 2019-11-03 | End: 2020-11-30 | Stop reason: SDUPTHER

## 2019-11-07 ENCOUNTER — TELEPHONE (OUTPATIENT)
Dept: GASTROENTEROLOGY | Facility: CLINIC | Age: 79
End: 2019-11-07

## 2019-11-07 ENCOUNTER — OFFICE VISIT (OUTPATIENT)
Dept: GASTROENTEROLOGY | Facility: CLINIC | Age: 79
End: 2019-11-07
Payer: MEDICARE

## 2019-11-07 ENCOUNTER — HOSPITAL ENCOUNTER (OUTPATIENT)
Dept: RADIOLOGY | Facility: HOSPITAL | Age: 79
Discharge: HOME | End: 2019-11-07
Attending: INTERNAL MEDICINE
Payer: MEDICARE

## 2019-11-07 VITALS
HEIGHT: 65 IN | SYSTOLIC BLOOD PRESSURE: 100 MMHG | HEART RATE: 60 BPM | BODY MASS INDEX: 22.34 KG/M2 | WEIGHT: 134.06 LBS | DIASTOLIC BLOOD PRESSURE: 60 MMHG

## 2019-11-07 DIAGNOSIS — R15.2 INCONTINENCE OF FECES WITH FECAL URGENCY: ICD-10-CM

## 2019-11-07 DIAGNOSIS — R15.9 INCONTINENCE OF FECES WITH FECAL URGENCY: ICD-10-CM

## 2019-11-07 DIAGNOSIS — K52.9 CHRONIC DIARRHEA: Primary | ICD-10-CM

## 2019-11-07 DIAGNOSIS — Z79.01 ANTICOAGULANT LONG-TERM USE: ICD-10-CM

## 2019-11-07 DIAGNOSIS — K62.5 RECTAL BLEEDING: ICD-10-CM

## 2019-11-07 DIAGNOSIS — Z12.31 ENCOUNTER FOR SCREENING MAMMOGRAM FOR MALIGNANT NEOPLASM OF BREAST: ICD-10-CM

## 2019-11-07 PROCEDURE — 99213 OFFICE O/P EST LOW 20 MIN: CPT | Mod: PBBFAC | Performed by: NURSE PRACTITIONER

## 2019-11-07 PROCEDURE — 99999 PR PBB SHADOW E&M-EST. PATIENT-LVL III: ICD-10-PCS | Mod: PBBFAC,,, | Performed by: NURSE PRACTITIONER

## 2019-11-07 PROCEDURE — 77063 BREAST TOMOSYNTHESIS BI: CPT | Mod: 26,,, | Performed by: RADIOLOGY

## 2019-11-07 PROCEDURE — 77067 MAMMO DIGITAL SCREENING BILAT WITH TOMOSYNTHESIS_CAD: ICD-10-PCS | Mod: 26,,, | Performed by: RADIOLOGY

## 2019-11-07 PROCEDURE — 99999 PR PBB SHADOW E&M-EST. PATIENT-LVL III: CPT | Mod: PBBFAC,,, | Performed by: NURSE PRACTITIONER

## 2019-11-07 PROCEDURE — 77067 SCR MAMMO BI INCL CAD: CPT | Mod: 26,,, | Performed by: RADIOLOGY

## 2019-11-07 PROCEDURE — 77067 SCR MAMMO BI INCL CAD: CPT | Mod: TC

## 2019-11-07 PROCEDURE — 99214 PR OFFICE/OUTPT VISIT, EST, LEVL IV, 30-39 MIN: ICD-10-PCS | Mod: S$PBB,,, | Performed by: NURSE PRACTITIONER

## 2019-11-07 PROCEDURE — 99214 OFFICE O/P EST MOD 30 MIN: CPT | Mod: S$PBB,,, | Performed by: NURSE PRACTITIONER

## 2019-11-07 PROCEDURE — 77063 MAMMO DIGITAL SCREENING BILAT WITH TOMOSYNTHESIS_CAD: ICD-10-PCS | Mod: 26,,, | Performed by: RADIOLOGY

## 2019-11-07 RX ORDER — SODIUM, POTASSIUM,MAG SULFATES 17.5-3.13G
SOLUTION, RECONSTITUTED, ORAL ORAL
Qty: 354 ML | Refills: 0 | Status: ON HOLD | OUTPATIENT
Start: 2019-11-07 | End: 2020-01-10 | Stop reason: HOSPADM

## 2019-11-07 NOTE — LETTER
November 7, 2019      Avelino Gudino MD  [address] [place] [address]           [facility]  [address] [facility] [place] [address]  Phone: [phone]  Fax: [phone]          Patient: Nerissa Burnham   MR Number: [id number]   YOB: 1940   Date of Visit: 11/7/2019       Dear Dr. Avelino Gudino:    Thank you for referring Nerissa Burnham to me for evaluation. Attached you will find relevant portions of my assessment and plan of care.    If you have questions, please do not hesitate to call me. I look forward to following Nerissa Burnham along with you.    Sincerely,    Teetee Barakat, RAFFAELE    Enclosure  CC:  No Recipients    If you would like to receive this communication electronically, please contact [email or web address] or [phone] to request more information on [employer] Link access.    For providers and/or their staff who would like to refer a patient to [facility], please contact us through our one-stop-shop provider referral line, [facility], at [phone].    If you feel you have received this communication in error or would no longer like to receive these types of communications, please e-mail [email or web address]

## 2019-11-07 NOTE — PROGRESS NOTES
Clinic Follow Up:  [facility] Follow Up Note    Reason for Follow Up:  The primary encounter diagnosis was Chronic diarrhea. Diagnoses of Rectal bleeding, Incontinence of feces with fecal urgency, and Anticoagulant long-term use were also pertinent to this visit.    PCP: Avelino Gudino   [address] [facility] [address] [place] [address]    HPI:  This is a 79 y.o. female here for follow up of the above. She reports having intermittent diarrhea. She has diarrhea about once every 1-2 weeks. On those days she will have between 1-3 loose stools on those days. She describes no control over her bowels when this happens. She has been having rectal bleeding for about 5 months now. Blood is bright red. This occurs mostly when she has diarrhea. She does not notice blood when she is passing formed stools. Her last colonoscopy was about 4 years ago at  Clinic. She does have a history of colon polyps.     Review of Systems   Constitutional: Negative for activity change and appetite change.        As per interval history above   Respiratory: Negative for cough and shortness of breath.    Cardiovascular: Negative for chest pain.   Gastrointestinal: Positive for diarrhea. Negative for abdominal pain, constipation, nausea and vomiting.        Rectal bleeding    Skin: Negative for color change and rash.       Medical History:  Past Medical History:   Diagnosis Date    Depression     Hyperlipidemia     Hypertension        Surgical History:   Past Surgical History:   Procedure Laterality Date    BREAST SURGERY Bilateral     Augmentation    HYSTERECTOMY      OOPHORECTOMY         Family History:   Family History   Problem Relation Age of Onset    Cancer Mother         Breast    Breast cancer Mother     No Known Problems Father     Breast cancer Maternal Aunt        Social History:   Social History     Tobacco Use    Smoking status: Never Smoker    Smokeless tobacco: Never Used   Substance Use Topics     "Alcohol use: Yes     Alcohol/week: 14.0 standard drinks     Types: 14 Glasses of wine per week    Drug use: No       Allergies: Review of patient's allergies indicates:  No Known Allergies    Home Medications:  Current Outpatient Medications on File Prior to Visit   Medication Sig Dispense Refill    alendronate (FOSAMAX) 70 MG tablet Take 1 tablet (70 mg total) by mouth every 7 days. 12 tablet 3    atorvastatin (LIPITOR) 10 MG tablet TAKE 1 TABLET BY MOUTH EVERY DAY 90 tablet 2    cholecalciferol, vitamin D3, (VITAMIN D3) 1,000 unit capsule Take 1,000 Units by mouth once daily.      ELIQUIS 5 mg Tab TAKE 1 TABLET BY MOUTH TWICE A DAY 60 tablet 5    escitalopram oxalate (LEXAPRO) 20 MG tablet Take 1 tablet (20 mg total) by mouth once daily. 90 tablet 2    famciclovir (FAMVIR) 500 MG tablet Take 1,000 mg by mouth as needed.      metoprolol succinate (TOPROL-XL) 25 MG 24 hr tablet TAKE 1 TABLET BY MOUTH EVERY DAY 90 tablet 3    oxybutynin (DITROPAN-XL) 5 MG TR24 Take 1 tablet (5 mg total) by mouth once daily. 90 tablet 0    ramipril (ALTACE) 2.5 MG capsule TAKE 1 CAPSULE (2.5 MG TOTAL) BY MOUTH ONCE DAILY. 90 capsule 3    traZODone (DESYREL) 50 MG tablet TAKE 1 TABLET (50 MG TOTAL) BY MOUTH NIGHTLY. 90 tablet 1    betamethasone dipropionate (DIPROLENE) 0.05 % cream Apply topically as needed.  6    [DISCONTINUED] SUPREP BOWEL PREP KIT 17.5-3.13-1.6 gram SolR Use as directed (Patient not taking: Reported on 10/24/2019) 354 mL 0     No current facility-administered medications on file prior to visit.        /60   Pulse 60   Ht 5' 5" (1.651 m)   Wt 60.8 kg (134 lb 0.6 oz)   BMI 22.31 kg/m²   Body mass index is 22.31 kg/m².  Physical Exam   Constitutional: She is oriented to person, place, and time and well-developed, well-nourished, and in no distress. No distress.   HENT:   Head: Normocephalic.   Eyes: Pupils are equal, round, and reactive to light. Conjunctivae are normal.   Cardiovascular: Normal " rate, regular rhythm and normal heart sounds.   Pulmonary/Chest: Effort normal and breath sounds normal. No respiratory distress.   Abdominal: Soft. Bowel sounds are normal. She exhibits no distension. There is no tenderness.   Neurological: She is alert and oriented to person, place, and time. No cranial nerve deficit.   Skin: Skin is warm and dry. No rash noted.   Psychiatric: Mood and affect normal.       Labs: Pertinent labs reviewed.    Assessment:  1. Chronic diarrhea    2. Rectal bleeding    3. Incontinence of feces with fecal urgency    4. Anticoagulant long-term use        Recommendations:  - recommend proceeding with colonoscopy with biopsies to check for microscopic colitis as cause of diarrhea  - also with rectal bleeding  - incontinence likely secondary to diarrhea. Can consider pelvic floor therapy in the future if symptoms persist  - on Eliquis. Need to send request to cardiology for approval to hold prior to procedure.   -     Case request GI: COLONOSCOPY  -     SUPREP BOWEL PREP KIT 17.5-3.13-1.6 gram SolR; Use as directed  Dispense: 354 mL; Refill: 0    Return to Clinic:  Follow up to be determined after procedure.    Thank you for the opportunity to participate in the care of this patient.  SHADI Rasmussen

## 2019-11-08 ENCOUNTER — TELEPHONE (OUTPATIENT)
Dept: GASTROENTEROLOGY | Facility: CLINIC | Age: 79
End: 2019-11-08

## 2019-11-08 NOTE — TELEPHONE ENCOUNTER
Pt advised per simon Jenkins to stop Eliquis 2 days prior to procedure. Pt verbalized understanding.

## 2019-11-14 ENCOUNTER — TELEPHONE (OUTPATIENT)
Dept: INTERNAL MEDICINE | Facility: CLINIC | Age: 79
End: 2019-11-14

## 2019-11-14 NOTE — TELEPHONE ENCOUNTER
Pt stated she received a message stating her urine had an infection. I informed her that her urine testing was normal. She verbalized understanding.   Spoke with mom Katlyn, she is aware of MD message below and will contact [facility] for appointment.

## 2019-11-14 NOTE — TELEPHONE ENCOUNTER
----- Message from Maliha Ortez sent at 11/14/2019 12:52 PM CST -----  Contact: Pt  Pt asked that nurse return her call regarding test results and medication that was supposed to be prescribed, please contact pt at ([phone])

## 2019-11-30 ENCOUNTER — EXTERNAL CHRONIC CARE MANAGEMENT (OUTPATIENT)
Dept: PRIMARY CARE CLINIC | Facility: CLINIC | Age: 79
End: 2019-11-30

## 2019-12-10 ENCOUNTER — OFFICE VISIT (OUTPATIENT)
Dept: CARDIOLOGY | Facility: CLINIC | Age: 79
End: 2019-12-10
Payer: MEDICARE

## 2019-12-10 VITALS
SYSTOLIC BLOOD PRESSURE: 102 MMHG | BODY MASS INDEX: 22.38 KG/M2 | OXYGEN SATURATION: 94 % | HEART RATE: 57 BPM | WEIGHT: 134.5 LBS | DIASTOLIC BLOOD PRESSURE: 58 MMHG

## 2019-12-10 DIAGNOSIS — I48.0 PAF (PAROXYSMAL ATRIAL FIBRILLATION): Primary | ICD-10-CM

## 2019-12-10 DIAGNOSIS — E78.49 OTHER HYPERLIPIDEMIA: ICD-10-CM

## 2019-12-10 DIAGNOSIS — I10 ESSENTIAL HYPERTENSION: ICD-10-CM

## 2019-12-10 PROCEDURE — 1159F MED LIST DOCD IN RCRD: CPT | Mod: ,,, | Performed by: INTERNAL MEDICINE

## 2019-12-10 PROCEDURE — 99999 PR PBB SHADOW E&M-EST. PATIENT-LVL III: ICD-10-PCS | Mod: PBBFAC,,, | Performed by: INTERNAL MEDICINE

## 2019-12-10 PROCEDURE — 99214 PR OFFICE/OUTPT VISIT, EST, LEVL IV, 30-39 MIN: ICD-10-PCS | Mod: S$PBB,,, | Performed by: INTERNAL MEDICINE

## 2019-12-10 PROCEDURE — 1126F AMNT PAIN NOTED NONE PRSNT: CPT | Mod: ,,, | Performed by: INTERNAL MEDICINE

## 2019-12-10 PROCEDURE — 99214 OFFICE O/P EST MOD 30 MIN: CPT | Mod: S$PBB,,, | Performed by: INTERNAL MEDICINE

## 2019-12-10 PROCEDURE — 1159F PR MEDICATION LIST DOCUMENTED IN MEDICAL RECORD: ICD-10-PCS | Mod: ,,, | Performed by: INTERNAL MEDICINE

## 2019-12-10 PROCEDURE — 99213 OFFICE O/P EST LOW 20 MIN: CPT | Mod: PBBFAC | Performed by: INTERNAL MEDICINE

## 2019-12-10 PROCEDURE — 1126F PR PAIN SEVERITY QUANTIFIED, NO PAIN PRESENT: ICD-10-PCS | Mod: ,,, | Performed by: INTERNAL MEDICINE

## 2019-12-10 PROCEDURE — 99999 PR PBB SHADOW E&M-EST. PATIENT-LVL III: CPT | Mod: PBBFAC,,, | Performed by: INTERNAL MEDICINE

## 2019-12-10 NOTE — PROGRESS NOTES
Subjective:   Patient ID:  Nerissa Burnham is a 79 y.o. female who presents for follow up of No chief complaint on file.      78 yo female 6 months f/u  PMH PAF, HTN, HLD and depression.  No smoking.  passed away in . Since then, the palpitation seems worse.    ECHo showed normal EF, mild AI, LAE and garde I DD  holter showed PAF 7% burden in . ToprolXL 25 mg added.  States since she decreased drinking, her palpitation improved. Now two glasses wine a day  No faint, dizziness and chest pain/dyspnea.  Sometime feels knees imbalanced but no fall. More at night when uses the bathroom.        Past Medical History:   Diagnosis Date    Depression     Hyperlipidemia     Hypertension        Past Surgical History:   Procedure Laterality Date    AUGMENTATION OF BREAST      BREAST SURGERY Bilateral     Augmentation    HYSTERECTOMY      OOPHORECTOMY         Social History     Tobacco Use    Smoking status: Never Smoker    Smokeless tobacco: Never Used   Substance Use Topics    Alcohol use: Yes     Alcohol/week: 14.0 standard drinks     Types: 14 Glasses of wine per week    Drug use: No       Family History   Problem Relation Age of Onset    Cancer Mother         Breast    Breast cancer Mother     No Known Problems Father     Breast cancer Maternal Aunt          Review of Systems   Constitution: Negative for decreased appetite, diaphoresis, fever, malaise/fatigue and night sweats.   HENT: Negative for nosebleeds.    Eyes: Negative for blurred vision and double vision.   Cardiovascular: Positive for palpitations. Negative for chest pain, claudication, dyspnea on exertion, irregular heartbeat, leg swelling, near-syncope, orthopnea, paroxysmal nocturnal dyspnea and syncope.   Respiratory: Negative for cough, shortness of breath, sleep disturbances due to breathing, snoring, sputum production and wheezing.    Endocrine: Negative for cold intolerance and polyuria.   Hematologic/Lymphatic: Does  not bruise/bleed easily.   Skin: Negative for rash.   Musculoskeletal: Negative for back pain, falls, joint pain, joint swelling and neck pain.   Gastrointestinal: Negative for abdominal pain, heartburn, nausea and vomiting.   Genitourinary: Negative for dysuria, frequency and hematuria.   Neurological: Negative for difficulty with concentration, dizziness, focal weakness, headaches, light-headedness, numbness, seizures and weakness.   Psychiatric/Behavioral: Negative for depression, memory loss and substance abuse. The patient does not have insomnia.    Allergic/Immunologic: Negative for HIV exposure and hives.       Objective:   Physical Exam   Constitutional: She is oriented to person, place, and time. She appears well-nourished.   HENT:   Head: Normocephalic.   Eyes: Pupils are equal, round, and reactive to light.   Neck: Normal carotid pulses and no JVD present. Carotid bruit is not present. No thyromegaly present.   Cardiovascular: Normal rate, regular rhythm and normal pulses.  No extrasystoles are present. PMI is not displaced. Exam reveals no gallop and no S3.   Murmur heard.  1/6 ESM on URSB   Pulmonary/Chest: Breath sounds normal. No stridor. No respiratory distress.   Wheezing on right   Abdominal: Soft. Bowel sounds are normal. There is no tenderness. There is no rebound.   Musculoskeletal: Normal range of motion.   Neurological: She is alert and oriented to person, place, and time.   Skin: Skin is intact. No rash noted.   Psychiatric: Her behavior is normal.       Lab Results   Component Value Date    CHOL 155 10/25/2019    CHOL 181 10/17/2018     Lab Results   Component Value Date    HDL 58 10/25/2019    HDL 74 10/17/2018     Lab Results   Component Value Date    LDLCALC 86.2 10/25/2019    LDLCALC 91.8 10/17/2018     Lab Results   Component Value Date    TRIG 54 10/25/2019    TRIG 76 10/17/2018     Lab Results   Component Value Date    CHOLHDL 37.4 10/25/2019    CHOLHDL 40.9 10/17/2018       Chemistry         Component Value Date/Time     10/25/2019 0824    K 4.6 10/25/2019 0824     10/25/2019 0824    CO2 28 10/25/2019 0824    BUN 23 10/25/2019 0824    CREATININE 1.0 10/25/2019 0824    GLU 95 10/25/2019 0824        Component Value Date/Time    CALCIUM 9.6 10/25/2019 0824    ALKPHOS 40 (L) 10/25/2019 0824    AST 16 10/25/2019 0824    ALT 10 10/25/2019 0824    BILITOT 0.4 10/25/2019 0824    ESTGFRAFRICA >60.0 10/25/2019 0824    EGFRNONAA 54.5 (A) 10/25/2019 0824          No results found for: LABA1C, HGBA1C  Lab Results   Component Value Date    TSH 1.862 10/25/2019     No results found for: INR, PROTIME  Lab Results   Component Value Date    WBC 5.16 10/25/2019    HGB 12.6 10/25/2019    HCT 38.8 10/25/2019    MCV 98 10/25/2019     10/25/2019     BMP  Sodium   Date Value Ref Range Status   10/25/2019 144 136 - 145 mmol/L Final     Potassium   Date Value Ref Range Status   10/25/2019 4.6 3.5 - 5.1 mmol/L Final     Chloride   Date Value Ref Range Status   10/25/2019 109 95 - 110 mmol/L Final     CO2   Date Value Ref Range Status   10/25/2019 28 23 - 29 mmol/L Final     BUN, Bld   Date Value Ref Range Status   10/25/2019 23 8 - 23 mg/dL Final     Creatinine   Date Value Ref Range Status   10/25/2019 1.0 0.5 - 1.4 mg/dL Final     Calcium   Date Value Ref Range Status   10/25/2019 9.6 8.7 - 10.5 mg/dL Final     Anion Gap   Date Value Ref Range Status   10/25/2019 7 (L) 8 - 16 mmol/L Final     eGFR if    Date Value Ref Range Status   10/25/2019 >60.0 >60 mL/min/1.73 m^2 Final     eGFR if non    Date Value Ref Range Status   10/25/2019 54.5 (A) >60 mL/min/1.73 m^2 Final     Comment:     Calculation used to obtain the estimated glomerular filtration  rate (eGFR) is the CKD-EPI equation.        BNP  @LABRCNTIP(BNP,BNPTRIAGEBLO)@  @LABRCNTIP(troponini)@  CrCl cannot be calculated (Patient's most recent lab result is older than the maximum 7 days allowed.).  No results found  in the last 24 hours.  No results found in the last 24 hours.  No results found in the last 24 hours.    Assessment:      1. PAF (paroxysmal atrial fibrillation)    2. Essential hypertension    3. Other hyperlipidemia      PAF on sinus  No active bleeding  BP and LDL wnl  A1c wnl  Plan:   Continue ELiquis 5mg bid, BB, ACEi and statin  DM Rx per PCP  DASH  Recommend heart-healthy diet, weight control and regular exercise.  Dian. Risk modification.   RTC in 6 months    I have reviewed all pertinent labs and cardiac studies. Plans and recommendations have been formulated under my direct supervision. All questions answered and patient voiced understanding. Patient to continue current medications.

## 2019-12-31 ENCOUNTER — EXTERNAL CHRONIC CARE MANAGEMENT (OUTPATIENT)
Dept: PRIMARY CARE CLINIC | Facility: CLINIC | Age: 79
End: 2019-12-31
Payer: MEDICARE

## 2019-12-31 PROCEDURE — 99490 CHRNC CARE MGMT STAFF 1ST 20: CPT | Mod: PBBFAC | Performed by: INTERNAL MEDICINE

## 2019-12-31 PROCEDURE — 99490 PR CHRONIC CARE MGMT, 1ST 20 MIN: ICD-10-PCS | Mod: S$PBB,,, | Performed by: INTERNAL MEDICINE

## 2019-12-31 PROCEDURE — 99490 CHRNC CARE MGMT STAFF 1ST 20: CPT | Mod: S$PBB,,, | Performed by: INTERNAL MEDICINE

## 2020-01-10 ENCOUNTER — ANESTHESIA (OUTPATIENT)
Dept: ENDOSCOPY | Facility: HOSPITAL | Age: 80
End: 2020-01-10
Payer: MEDICARE

## 2020-01-10 ENCOUNTER — ANESTHESIA EVENT (OUTPATIENT)
Dept: ENDOSCOPY | Facility: HOSPITAL | Age: 80
End: 2020-01-10
Payer: MEDICARE

## 2020-01-10 ENCOUNTER — HOSPITAL ENCOUNTER (OUTPATIENT)
Facility: HOSPITAL | Age: 80
Discharge: HOME | End: 2020-01-10
Attending: INTERNAL MEDICINE | Admitting: INTERNAL MEDICINE
Payer: MEDICARE

## 2020-01-10 VITALS
RESPIRATION RATE: 18 BRPM | HEART RATE: 69 BPM | SYSTOLIC BLOOD PRESSURE: 139 MMHG | DIASTOLIC BLOOD PRESSURE: 66 MMHG | BODY MASS INDEX: 21.31 KG/M2 | WEIGHT: 127.88 LBS | OXYGEN SATURATION: 98 % | HEIGHT: 65 IN | TEMPERATURE: 98 F

## 2020-01-10 DIAGNOSIS — R19.7 DIARRHEA: Primary | ICD-10-CM

## 2020-01-10 PROCEDURE — 45380 PR COLONOSCOPY,BIOPSY: ICD-10-PCS | Mod: ,,, | Performed by: INTERNAL MEDICINE

## 2020-01-10 PROCEDURE — 88305 TISSUE EXAM BY PATHOLOGIST: CPT | Performed by: PATHOLOGY

## 2020-01-10 PROCEDURE — 45380 COLONOSCOPY AND BIOPSY: CPT | Mod: ,,, | Performed by: INTERNAL MEDICINE

## 2020-01-10 PROCEDURE — 63600175 PHARM REV CODE 636 W HCPCS: Performed by: INTERNAL MEDICINE

## 2020-01-10 PROCEDURE — 45380 COLONOSCOPY AND BIOPSY: CPT | Performed by: INTERNAL MEDICINE

## 2020-01-10 PROCEDURE — 27201012 HC FORCEPS, HOT/COLD, DISP: Performed by: INTERNAL MEDICINE

## 2020-01-10 PROCEDURE — 88305 TISSUE EXAM BY PATHOLOGIST: CPT | Mod: 26,,, | Performed by: PATHOLOGY

## 2020-01-10 PROCEDURE — 25000003 PHARM REV CODE 250: Performed by: NURSE ANESTHETIST, CERTIFIED REGISTERED

## 2020-01-10 PROCEDURE — 37000009 HC ANESTHESIA EA ADD 15 MINS: Performed by: INTERNAL MEDICINE

## 2020-01-10 PROCEDURE — 37000008 HC ANESTHESIA 1ST 15 MINUTES: Performed by: INTERNAL MEDICINE

## 2020-01-10 PROCEDURE — 88305 TISSUE EXAM BY PATHOLOGIST: ICD-10-PCS | Mod: 26,,, | Performed by: PATHOLOGY

## 2020-01-10 PROCEDURE — 63600175 PHARM REV CODE 636 W HCPCS: Performed by: NURSE ANESTHETIST, CERTIFIED REGISTERED

## 2020-01-10 RX ORDER — LIDOCAINE HYDROCHLORIDE 10 MG/ML
INJECTION, SOLUTION EPIDURAL; INFILTRATION; INTRACAUDAL; PERINEURAL
Status: DISCONTINUED | OUTPATIENT
Start: 2020-01-10 | End: 2020-01-10

## 2020-01-10 RX ORDER — SODIUM CHLORIDE, SODIUM LACTATE, POTASSIUM CHLORIDE, CALCIUM CHLORIDE 600; 310; 30; 20 MG/100ML; MG/100ML; MG/100ML; MG/100ML
INJECTION, SOLUTION INTRAVENOUS CONTINUOUS
Status: DISCONTINUED | OUTPATIENT
Start: 2020-01-10 | End: 2020-01-10 | Stop reason: HOSPADM

## 2020-01-10 RX ORDER — PROPOFOL 10 MG/ML
VIAL (ML) INTRAVENOUS
Status: DISCONTINUED | OUTPATIENT
Start: 2020-01-10 | End: 2020-01-10

## 2020-01-10 RX ADMIN — LIDOCAINE HYDROCHLORIDE 50 MG: 10 INJECTION, SOLUTION EPIDURAL; INFILTRATION; INTRACAUDAL; PERINEURAL at 08:01

## 2020-01-10 RX ADMIN — PROPOFOL 100 MG: 10 INJECTION, EMULSION INTRAVENOUS at 08:01

## 2020-01-10 RX ADMIN — SODIUM CHLORIDE, SODIUM LACTATE, POTASSIUM CHLORIDE, AND CALCIUM CHLORIDE: 600; 310; 30; 20 INJECTION, SOLUTION INTRAVENOUS at 08:01

## 2020-01-10 RX ADMIN — PROPOFOL 50 MG: 10 INJECTION, EMULSION INTRAVENOUS at 08:01

## 2020-01-10 NOTE — [employer] PATIENT INSTRUCTIONS
Discharge Summary/Instructions after an Endoscopic Procedure  Patient Name: Nerissa Burnham  Patient MRN: [id number]  Patient YOB: 1940  Friday, January 10, 2020 Donna Pruitt MD  RESTRICTIONS:  During your procedure today, you received medications for sedation.  These   medications may affect your judgment, balance and coordination.  Therefore,   for 24 hours, you have the following restrictions:   - DO NOT drive a car, operate machinery, make legal/financial decisions,   sign important papers or drink alcohol.    ACTIVITY:  Today: no heavy lifting, straining or running due to procedural   sedation/anesthesia.  The following day: return to full activity including work.  DIET:  Eat and drink normally unless instructed otherwise.     TREATMENT FOR COMMON SIDE EFFECTS:  - Mild abdominal pain, nausea, belching, bloating or excessive gas:  rest,   eat lightly and use a heating pad.  - Sore Throat: treat with throat lozenges and/or gargle with warm salt   water.  - Because air was used during the procedure, expelling large amounts of air   from your rectum or belching is normal.  - If a bowel prep was taken, you may not have a bowel movement for 1-3 days.    This is normal.  SYMPTOMS TO WATCH FOR AND REPORT TO YOUR PHYSICIAN:  1. Abdominal pain or bloating, other than gas cramps.  2. Chest pain.  3. Back pain.  4. Signs of infection such as: chills or fever occurring within 24 hours   after the procedure.  5. Rectal bleeding, which would show as bright red, maroon, or black stools.   (A tablespoon of blood from the rectum is not serious, especially if   hemorrhoids are present.)  6. Vomiting.  7. Weakness or dizziness.  GO DIRECTLY TO THE NEAREST EMERGENCY ROOM IF YOU HAVE ANY OF THE FOLLOWING:      Difficulty breathing              Chills and/or fever over 101 F   Persistent vomiting and/or vomiting blood   Severe abdominal pain   Severe chest pain   Black, tarry stools   Bleeding- more than one  tablespoon   Any other symptom or condition that you feel may need urgent attention  Your doctor recommends these additional instructions:  If any biopsies were taken, your doctors clinic will contact you in 1 to 2   weeks with any results.  - Patient has a contact number available for emergencies.  The signs and   symptoms of potential delayed complications were discussed with the   patient.  Return to normal activities tomorrow.  Written discharge   instructions were provided to the patient.   - Discharge patient to home (via wheelchair).   - Resume previous diet today.   - Continue present medications.   - Resume Eliquis (apixaban) at prior dose today.   - Repeat colonoscopy is not recommended due to current age (66 years or   older) for screening purposes.   - Await pathology results.  For questions, problems or results please call your physician Donna Pruitt MD at Work:  [phone]  If you have any questions about the above instructions, call the GI   department at [phone] or call the endoscopy unit at [phone]   from 7am until 3 pm.  [facility] [place], EMERGENCY ROOM PHONE NUMBER:   [phone]  IF A COMPLICATION OR EMERGENCY SITUATION ARISES AND YOU ARE UNABLE TO REACH   YOUR PHYSICIAN - GO DIRECTLY TO THE EMERGENCY ROOM.  I have read or have had read to me these discharge instructions for my   procedure and have received a written copy.  I understand these   instructions and will follow-up with my physician if I have any questions.     __________________________________       _____________________________________  Nurse Signature                                          Patient/Designated   Responsible Party Signature  MD Donna Alarcon MD  1/10/2020 9:02:05 AM  This report has been verified and signed electronically.  [employer]

## 2020-01-10 NOTE — ANESTHESIA PREPROCEDURE EVALUATION
01/10/2020  Nerissa Burnham is a 79 y.o., female.    Anesthesia Evaluation    I have reviewed the Patient Summary Reports.    I have reviewed the Nursing Notes.   I have reviewed the Medications.     Review of Systems  Cardiovascular:   Hypertension    Hepatic/GI:   GERD    Psych:   Psychiatric History          Physical Exam  General:  Well nourished    Airway/Jaw/Neck:  Airway Findings: Mouth Opening: Normal Tongue: Normal  General Airway Assessment: Adult  Mallampati: II  TM Distance: Normal, at least 6 cm            Mental Status:  Mental Status Findings:  Cooperative, Alert and Oriented         Anesthesia Plan  Type of Anesthesia, risks & benefits discussed:  Anesthesia Type:  MAC  Patient's Preference:   Intra-op Monitoring Plan:   Intra-op Monitoring Plan Comments:   Post Op Pain Control Plan: IV/PO Opioids PRN  Post Op Pain Control Plan Comments:   Induction:   IV  Beta Blocker:  Patient is on a Beta-Blocker and has received one dose within the past 24 hours (No further documentation required).       Informed Consent: Patient understands risks and agrees with Anesthesia plan.  Questions answered. Anesthesia consent signed with patient.  ASA Score: 2     Day of Surgery Review of History & Physical: I have interviewed and examined the patient. I have reviewed the patient's H&P dated:    H&P update referred to the surgeon.         Ready For Surgery From Anesthesia Perspective.

## 2020-01-10 NOTE — TRANSFER OF CARE
"Anesthesia Transfer of Care Note    Patient: Nerissa Burnham    Procedure(s) Performed: Procedure(s) (LRB):  COLONOSCOPY (N/A)    Patient location: PACU    Anesthesia Type: MAC    Transport from OR: Transported from OR on room air with adequate spontaneous ventilation    Post pain: adequate analgesia    Post assessment: no apparent anesthetic complications    Post vital signs: stable    Level of consciousness: awake    Nausea/Vomiting: no nausea/vomiting    Complications: none    Transfer of care protocol was followed      Last vitals:   Visit Vitals  BP (!) 151/59 (BP Location: Left arm, Patient Position: Lying)   Pulse 70   Temp 36.7 °C (98 °F) (Temporal)   Resp 17   Ht 5' 5" (1.651 m)   Wt 58 kg (127 lb 13.9 oz)   SpO2 97%   Breastfeeding? No   BMI 21.28 kg/m²     "

## 2020-01-10 NOTE — DISCHARGE SUMMARY
Endoscopy Discharge Summary      Admit Date: 1/10/2020    Discharge Date and Time:  1/10/2020 8:58 AM    Attending Physician: Donna Pruitt MD     Discharge Physician: Donna Pruitt MD     Principal Admitting Diagnoses: Diarrhea         Discharge Diagnosis: The encounter diagnosis was Diarrhea.     Discharged Condition: Good    Indication for Admission: Diarrhea     Hospital Course: Patient was admitted for an inpatient procedure and tolerated the procedure well with no complications.    Significant Diagnostic Studies: Colonoscopy with biopsy    Pathology (if any):  Specimen (12h ago, onward)    None          Estimated Blood Loss: 1 ml.    Discussed with: patient.    Disposition: Home.    Follow Up/Patient Instructions:   Current Discharge Medication List      CONTINUE these medications which have NOT CHANGED    Details   atorvastatin (LIPITOR) 10 MG tablet TAKE 1 TABLET BY MOUTH EVERY DAY  Qty: 90 tablet, Refills: 2    Comments: DX Code Needed  .  Associated Diagnoses: Hyperlipidemia, unspecified hyperlipidemia type      cholecalciferol, vitamin D3, (VITAMIN D3) 1,000 unit capsule Take 1,000 Units by mouth once daily.      escitalopram oxalate (LEXAPRO) 20 MG tablet Take 1 tablet (20 mg total) by mouth once daily.  Qty: 90 tablet, Refills: 2    Associated Diagnoses: Anxiety; Depression, unspecified depression type      famciclovir (FAMVIR) 500 MG tablet Take 1,000 mg by mouth as needed.      metoprolol succinate (TOPROL-XL) 25 MG 24 hr tablet TAKE 1 TABLET BY MOUTH EVERY DAY  Qty: 90 tablet, Refills: 3      oxybutynin (DITROPAN-XL) 5 MG TR24 Take 1 tablet (5 mg total) by mouth once daily.  Qty: 90 tablet, Refills: 0    Associated Diagnoses: Urge incontinence      ramipril (ALTACE) 2.5 MG capsule TAKE 1 CAPSULE (2.5 MG TOTAL) BY MOUTH ONCE DAILY.  Qty: 90 capsule, Refills: 3      traZODone (DESYREL) 50 MG tablet TAKE 1 TABLET (50 MG TOTAL) BY MOUTH NIGHTLY.  Qty: 90 tablet, Refills: 1    Associated Diagnoses:  Anxiety      alendronate (FOSAMAX) 70 MG tablet Take 1 tablet (70 mg total) by mouth every 7 days.  Qty: 12 tablet, Refills: 3    Associated Diagnoses: Osteopenia, unspecified location      betamethasone dipropionate (DIPROLENE) 0.05 % cream Apply topically as needed.  Refills: 6      ELIQUIS 5 mg Tab TAKE 1 TABLET BY MOUTH TWICE A DAY  Qty: 60 tablet, Refills: 5         STOP taking these medications       SUPREP BOWEL PREP KIT 17.5-3.13-1.6 gram SolR Comments:   Reason for Stopping:               Discharge Procedure Orders   Diet general     Call MD for:  temperature >100.4     Call MD for:  persistent nausea and vomiting     Call MD for:  severe uncontrolled pain     Call MD for:  difficulty breathing, headache or visual disturbances     Activity as tolerated       Follow-up Information     Donna Pruitt MD. Call in 1 week.    Specialty:  Gastroenterology  Why:  For pathology results  Contact information:  [id number] [facility]  [place] [id number] [phone]

## 2020-01-10 NOTE — DISCHARGE INSTRUCTIONS
Colonoscopy     A camera attached to a flexible tube with a viewing lens is used to take video pictures.     Colonoscopy is a test to view the inside of your lower digestive tract (colon and rectum). Sometimes it can show the last part of the small intestine (ileum). During the test, small pieces of tissue may be removed for testing. This is called a biopsy. Small growths, such as polyps, may also be removed.   Why is colonoscopy done?  The test is done to help look for colon cancer. And it can help find the source of abdominal pain, bleeding, and changes in bowel habits. It may be needed once a year, depending on factors such as your:  · Age  · Health history  · Family health history  · Symptoms  · Results from any prior colonoscopy  Risks and possible complications  These include:  · Bleeding               · A puncture or tear in the colon   · Risks of anesthesia  · A cancer lesion not being seen  Getting ready   To prepare for the test:  · Talk with your healthcare provider about the risks of the test (see below). Also ask your healthcare provider about alternatives to the test.  · Tell your healthcare provider about any medicines you take. Also tell him or her about any health conditions you may have.  · Make sure your rectum and colon are empty for the test. Follow the diet and bowel prep instructions exactly. If you dont, the test may need to be rescheduled.  · Plan for a friend or family member to drive you home after the test.     Colonoscopy provides an inside view of the entire colon.     You may discuss the results with your doctor right away or at a future visit.  During the test   The test is usually done in the hospital on an outpatient basis. This means you go home the same day. The procedure takes about 30 minutes. During that time:  · You are given relaxing (sedating) medicine through an IV line. You may be drowsy, or fully asleep.  · The healthcare provider will first give you a physical exam to  check for anal and rectal problems.  · Then the anus is lubricated and the scope inserted.  · If you are awake, you may have a feeling similar to needing to have a bowel movement. You may also feel pressure as air is pumped into the colon. Its OK to pass gas during the procedure.  · Biopsy, polyp removal, or other treatments may be done during the test.  After the test   You may have gas right after the test. It can help to try to pass it to help prevent later bloating. Your healthcare provider may discuss the results with you right away. Or you may need to schedule a follow-up visit to talk about the results. After the test, you can go back to your normal eating and other activities. You may be tired from the sedation and need to rest for a few hours.  Date Last Reviewed: 11/1/2016 © 2000-2017 [employer]. [address] [place] [address]. All rights reserved. This information is not intended as a substitute for professional medical care. Always follow your healthcare professional's instructions.        Hemorrhoids    Hemorrhoids are swollen and inflamed veins inside the rectum and near the anus. The rectum is the last several inches of the colon. The anus is the passage between the rectum and the outside of the body.  Causes  The veins can become swollen due to increased pressure in them. This is most often caused by:  · Chronic constipation or diarrhea  · Straining when having a bowel movement  · Sitting too long on the toilet  · A low-fiber diet  · Pregnancy  Symptoms  · Bleeding from the rectum (this may be noticeable after bowel movements)  · Lump near the anus  · Itching around the anus  · Pain around the anus  There are different types of hemorrhoids. Depending on the type you have and the severity, you may be able to treat yourself at home. In some cases, a procedure may be the best treatment option. Your healthcare provider can tell you more about this, if needed.  Home  care  General care  · To get relief from pain or itching, try:  ¨ Topical products. Your healthcare provider may prescribe or recommend creams, ointments, or pads that can be applied to the hemorrhoid. Use these exactly as directed.  ¨ Medicines. Your healthcare provider may recommend stool softeners, suppositories, or laxatives to help manage constipation. Use these exactly as directed.  ¨ Sitz baths. A sitz bath involves sitting in a few inches of warm bath water. Be careful not to make the water so hot that you burn yourself--test it before sitting in it. Soak for about 10 to 15 minutes a few times a day. This may help relieve pain.  Tips to help prevent hemorrhoids  · Eat more fiber. Fiber adds bulk to stool and absorbs water as it moves through your colon. This makes stool softer and easier to pass.  ¨ Increase the fiber in your diet with more fiber-rich foods. These include fresh fruit, vegetables, and whole grains.  ¨ Take a fiber supplement or bulking agent, if advised to by your provider. These include products such as psyllium or methylcellulose.  · Drink plenty of water, if directed to by your provider. This can help keep stool soft.  · Be more active. Frequent exercise aids digestion and helps prevent constipation. It may also help make bowel movements more regular.  · Dont strain during bowel movements. This can make hemorrhoids more likely. Also, dont sit on the toilet for long periods of time.  Follow-up care  Follow up with your healthcare provider, or as advised. If a culture or imaging tests were done, you will be notified of the results when they are ready. This may take a few days or longer.  When to seek medical advice  Call your healthcare provider right away if any of these occur:  · Increased bleeding from the rectum  · Increased pain around the rectum or anus  · Weakness or dizziness  Call 911  Call 911 or return to the emergency department right away if any of these occur:  · Trouble  breathing or swallowing  · Fainting or loss of consciousness  · Unusually fast heart rate  · Vomiting blood  · Large amounts of blood in stool  Date Last Reviewed: 6/22/2015 © 2000-2017 [employer]. [address] [place] [address], [place] [address]. All rights reserved. This information is not intended as a substitute for professional medical care. Always follow your healthcare professional's instructions.

## 2020-01-10 NOTE — ANESTHESIA RELEASE NOTE
"Anesthesia Release from PACU Note    Patient: Nerissa Burnham    Procedure(s) Performed: Procedure(s) (LRB):  COLONOSCOPY (N/A)    Anesthesia type: MAC    Post pain: Adequate analgesia    Post assessment: no apparent anesthetic complications    Last Vitals:   Visit Vitals  BP (!) 151/59 (BP Location: Left arm, Patient Position: Lying)   Pulse 70   Temp 36.7 °C (98 °F) (Temporal)   Resp 17   Ht 5' 5" (1.651 m)   Wt 58 kg (127 lb 13.9 oz)   SpO2 97%   Breastfeeding? No   BMI 21.28 kg/m²       Post vital signs: stable    Level of consciousness: awake    Nausea/Vomiting: no nausea/no vomiting    Complications: none    Airway Patency: patent    Respiratory: unassisted, spontaneous ventilation    Cardiovascular: stable and blood pressure at baseline    Hydration: euvolemic  "

## 2020-01-10 NOTE — ANESTHESIA POSTPROCEDURE EVALUATION
Anesthesia Post Evaluation    Patient: Nerissa Burnham    Procedure(s) Performed: Procedure(s) (LRB):  COLONOSCOPY (N/A)    Final Anesthesia Type: MAC    Patient location during evaluation: PACU  Patient participation: Yes- Able to Participate  Level of consciousness: awake and alert  Post-procedure vital signs: reviewed and stable  Pain management: adequate  Airway patency: patent    PONV status at discharge: No PONV  Anesthetic complications: no      Cardiovascular status: blood pressure returned to baseline  Respiratory status: unassisted and spontaneous ventilation  Hydration status: euvolemic  Follow-up not needed.          Vitals Value Taken Time   /59 1/10/2020  8:58 AM   Temp 36.7 °C (98 °F) 1/10/2020  8:58 AM   Pulse 70 1/10/2020  8:58 AM   Resp 17 1/10/2020  8:58 AM   SpO2 97 % 1/10/2020  8:58 AM         No case tracking events are documented in the log.      Pain/Erika Score: Erika Score: 9 (1/10/2020  8:58 AM)

## 2020-01-10 NOTE — H&P
PRE PROCEDURE H&P    Patient Name: Nerissa Burnham  MRN: [id number]  : 1940  Date of Procedure:  1/10/2020  Referring Physician: Teetee Barakat NP  Primary Physician: Avelino Gudino MD  Procedure Physician: Donna Pruitt MD       Planned Procedure: Colonoscopy  Diagnosis: rectal bleeding  Diarrhea   Chief Complaint: Same as above    HPI: Patient is an 79 y.o. female is here for the above.     Last colonoscopy: 5 years ago   Family history: negative  Anticoagulation: eliquis, held 2 days ago    Past Medical History:   Past Medical History:   Diagnosis Date    Depression     Encounter for blood transfusion     Hyperlipidemia     Hypertension         Past Surgical History:  Past Surgical History:   Procedure Laterality Date    AUGMENTATION OF BREAST      BREAST SURGERY Bilateral     Augmentation    HYSTERECTOMY      OOPHORECTOMY          Home Medications:  Prior to Admission medications    Medication Sig Start Date End Date Taking? Authorizing Provider   atorvastatin (LIPITOR) 10 MG tablet TAKE 1 TABLET BY MOUTH EVERY DAY 10/21/19  Yes Avelino Gudino MD   cholecalciferol, vitamin D3, (VITAMIN D3) 1,000 unit capsule Take 1,000 Units by mouth once daily.   Yes Historical Provider, MD   escitalopram oxalate (LEXAPRO) 20 MG tablet Take 1 tablet (20 mg total) by mouth once daily. 10/24/19  Yes Avelino Gudino MD   famciclovir (FAMVIR) 500 MG tablet Take 1,000 mg by mouth as needed.   Yes Historical Provider, MD   metoprolol succinate (TOPROL-XL) 25 MG 24 hr tablet TAKE 1 TABLET BY MOUTH EVERY DAY 19  Yes Duke Cueva MD   oxybutynin (DITROPAN-XL) 5 MG TR24 Take 1 tablet (5 mg total) by mouth once daily. 10/24/19 10/23/20 Yes Avelino Gudino MD   ramipril (ALTACE) 2.5 MG capsule TAKE 1 CAPSULE (2.5 MG TOTAL) BY MOUTH ONCE DAILY. 11/3/19 11/2/20 Yes Duke Cueva MD   traZODone (DESYREL) 50 MG tablet TAKE 1 TABLET (50 MG TOTAL) BY MOUTH NIGHTLY. 19  Yes Avelino Gudino MD   alendronate (FOSAMAX) 70  MG tablet Take 1 tablet (70 mg total) by mouth every 7 days.  Patient not taking: Reported on 12/10/2019 10/24/19 10/23/20  Avelino Gudino MD   betamethasone dipropionate (DIPROLENE) 0.05 % cream Apply topically as needed. 5/4/17   Historical Provider, MD TORRES 5 mg Tab TAKE 1 TABLET BY MOUTH TWICE A DAY 8/7/19   Duke Cueva MD   SUPREP BOWEL PREP KIT 17.5-3.13-1.6 gram SolR Use as directed  Patient not taking: Reported on 12/10/2019 11/7/19   Teetee Barakat NP        Allergies:  Review of patient's allergies indicates:  No Known Allergies     Social History:   Social History     Socioeconomic History    Marital status:      Spouse name: Not on file    Number of children: Not on file    Years of education: Not on file    Highest education level: Not on file   Occupational History    Not on file   Social Needs    Financial resource strain: Not on file    Food insecurity:     Worry: Not on file     Inability: Not on file    Transportation needs:     Medical: Not on file     Non-medical: Not on file   Tobacco Use    Smoking status: Never Smoker    Smokeless tobacco: Never Used   Substance and Sexual Activity    Alcohol use: Yes     Comment: socially    Drug use: No    Sexual activity: Not on file   Lifestyle    Physical activity:     Days per week: Not on file     Minutes per session: Not on file    Stress: Not on file   Relationships    Social connections:     Talks on phone: Not on file     Gets together: Not on file     Attends Sabianism service: Not on file     Active member of club or organization: Not on file     Attends meetings of clubs or organizations: Not on file     Relationship status: Not on file   Other Topics Concern    Not on file   Social History Narrative    Not on file       Family History:  Family History   Problem Relation Age of Onset    Cancer Mother         Breast    Breast cancer Mother     No Known Problems Father     Breast cancer Maternal Aunt   "      ROS: No acute cardiac events, no acute respiratory complaints.     Physical Exam (all patients):    BP (!) 142/54 (BP Location: Left arm, Patient Position: Lying)   Pulse 69   Temp 98.2 °F (36.8 °C) (Temporal)   Resp 17   Ht 5' 5" (1.651 m)   Wt 58 kg (127 lb 13.9 oz)   SpO2 98%   Breastfeeding? No   BMI 21.28 kg/m²   Lungs: Clear to auscultation bilaterally, respirations unlabored  Heart: Regular rate and rhythm, S1 and S2 normal, no obvious murmurs  Abdomen:         Soft, non-tender, bowel sounds normal, no masses, no organomegaly    Lab Results   Component Value Date    WBC 5.16 10/25/2019    MCV 98 10/25/2019    RDW 12.4 10/25/2019     10/25/2019    GLU 95 10/25/2019    BUN 23 10/25/2019     10/25/2019    K 4.6 10/25/2019     10/25/2019        SEDATION PLAN: per anesthesia      History reviewed, vital signs satisfactory, cardiopulmonary status satisfactory, sedation options, risks and plans have been discussed with the patient  All their questions were answered and the patient agrees to the sedation procedures as planned and the patient is deemed an appropriate candidate for the sedation as planned.    Procedure explained to patient, informed consent obtained and placed in chart.    Donna Pruitt  1/10/2020  8:32 AM   "

## 2020-01-11 DIAGNOSIS — N39.41 URGE INCONTINENCE: ICD-10-CM

## 2020-01-11 RX ORDER — OXYBUTYNIN CHLORIDE 5 MG/1
TABLET, EXTENDED RELEASE ORAL
Qty: 90 TABLET | Refills: 0 | Status: SHIPPED | OUTPATIENT
Start: 2020-01-11 | End: 2020-04-20

## 2020-01-13 ENCOUNTER — TELEPHONE (OUTPATIENT)
Dept: CARDIOLOGY | Facility: CLINIC | Age: 80
End: 2020-01-13

## 2020-01-13 DIAGNOSIS — I48.0 PAF (PAROXYSMAL ATRIAL FIBRILLATION): Primary | ICD-10-CM

## 2020-01-13 LAB
FINAL PATHOLOGIC DIAGNOSIS: NORMAL
GROSS: NORMAL

## 2020-01-13 NOTE — TELEPHONE ENCOUNTER
----- Message from Yara Davila sent at 1/13/2020  2:36 PM CST -----  Contact: pt   Pt stated she will like a call back about her medication, she can be reached at [phone] Thanks

## 2020-01-13 NOTE — TELEPHONE ENCOUNTER
Rosibel Cueva, the pt is requesting an alternative medication for eliquis. Pt states that her out of pocket is costing to much. Says she is taking it 2 times a day. Please Advise.

## 2020-01-15 ENCOUNTER — TELEPHONE (OUTPATIENT)
Dept: CARDIOLOGY | Facility: CLINIC | Age: 80
End: 2020-01-15

## 2020-01-27 ENCOUNTER — TELEPHONE (OUTPATIENT)
Dept: CARDIOLOGY | Facility: CLINIC | Age: 80
End: 2020-01-27

## 2020-01-27 NOTE — TELEPHONE ENCOUNTER
Patient wanted to confirm that she is taking her medication correctly. She wanted to confirm the dosage of Xarelto 20mg BID and questioned if she should still be taking her metoprolol.    ----- Message from Lula Banda sent at 1/27/2020  1:08 PM CST -----  Contact: Pt  Please give pt a call at  [phone] (home) regarding some questions she needs to ask the nurse

## 2020-01-28 NOTE — TELEPHONE ENCOUNTER
I have attempted without success to contact this patient by phone to clarify medication directions. Left voicemail to call back.

## 2020-01-31 ENCOUNTER — EXTERNAL CHRONIC CARE MANAGEMENT (OUTPATIENT)
Dept: PRIMARY CARE CLINIC | Facility: CLINIC | Age: 80
End: 2020-01-31
Payer: MEDICARE

## 2020-01-31 DIAGNOSIS — F41.9 ANXIETY: ICD-10-CM

## 2020-01-31 PROCEDURE — 99490 PR CHRONIC CARE MGMT, 1ST 20 MIN: ICD-10-PCS | Mod: S$PBB,,, | Performed by: INTERNAL MEDICINE

## 2020-01-31 PROCEDURE — 99490 CHRNC CARE MGMT STAFF 1ST 20: CPT | Mod: PBBFAC | Performed by: INTERNAL MEDICINE

## 2020-01-31 PROCEDURE — 99490 CHRNC CARE MGMT STAFF 1ST 20: CPT | Mod: S$PBB,,, | Performed by: INTERNAL MEDICINE

## 2020-01-31 RX ORDER — TRAZODONE HYDROCHLORIDE 50 MG/1
50 TABLET ORAL NIGHTLY
Qty: 90 TABLET | Refills: 1 | Status: SHIPPED | OUTPATIENT
Start: 2020-01-31 | End: 2020-09-13

## 2020-02-03 DIAGNOSIS — B00.9 HSV-2 INFECTION: ICD-10-CM

## 2020-02-03 RX ORDER — VALACYCLOVIR HYDROCHLORIDE 500 MG/1
500 TABLET, FILM COATED ORAL 2 TIMES DAILY
Qty: 90 TABLET | Refills: 2 | Status: SHIPPED | OUTPATIENT
Start: 2020-02-03 | End: 2020-05-24

## 2020-02-03 NOTE — TELEPHONE ENCOUNTER
----- Message from Lorena Correa sent at 2/3/2020 10:19 AM CST -----  Contact: Self- [phone]  .Type:  RX Refill Request    Who Called: Nerissa Burnham  Refill or New Rx:Refill   RX Name and Strength:Valacyclovir 500mg   How is the patient currently taking it? (ex. 1XDay):1xday   Is this a 30 day or 90 day RX:90day   Preferred Pharmacy with phone number:./  [facility]/pharmacy #[id number] - [place], [place] - [address].  [address]  [address]  [place] [address]  Phone: [phone] Fax: [phone]      Local or Mail Order:Local   Ordering Provider:   Would the patient rather a call back or a response via [facility]? Call back   Best Call Back Number:.[phone] (home)   Additional Information:     Thank You,   Lorena Correa

## 2020-02-29 ENCOUNTER — EXTERNAL CHRONIC CARE MANAGEMENT (OUTPATIENT)
Dept: PRIMARY CARE CLINIC | Facility: CLINIC | Age: 80
End: 2020-02-29
Payer: MEDICARE

## 2020-02-29 PROCEDURE — 99490 PR CHRONIC CARE MGMT, 1ST 20 MIN: ICD-10-PCS | Mod: S$PBB,,, | Performed by: INTERNAL MEDICINE

## 2020-02-29 PROCEDURE — 99490 CHRNC CARE MGMT STAFF 1ST 20: CPT | Mod: PBBFAC | Performed by: INTERNAL MEDICINE

## 2020-02-29 PROCEDURE — 99490 CHRNC CARE MGMT STAFF 1ST 20: CPT | Mod: S$PBB,,, | Performed by: INTERNAL MEDICINE

## 2020-03-31 ENCOUNTER — EXTERNAL CHRONIC CARE MANAGEMENT (OUTPATIENT)
Dept: PRIMARY CARE CLINIC | Facility: CLINIC | Age: 80
End: 2020-03-31
Payer: MEDICARE

## 2020-03-31 PROCEDURE — 99490 PR CHRONIC CARE MGMT, 1ST 20 MIN: ICD-10-PCS | Mod: S$PBB,,, | Performed by: INTERNAL MEDICINE

## 2020-03-31 PROCEDURE — 99490 CHRNC CARE MGMT STAFF 1ST 20: CPT | Mod: S$PBB,,, | Performed by: INTERNAL MEDICINE

## 2020-03-31 PROCEDURE — 99490 CHRNC CARE MGMT STAFF 1ST 20: CPT | Mod: PBBFAC | Performed by: INTERNAL MEDICINE

## 2020-04-20 DIAGNOSIS — N39.41 URGE INCONTINENCE: ICD-10-CM

## 2020-04-20 RX ORDER — OXYBUTYNIN CHLORIDE 5 MG/1
TABLET, EXTENDED RELEASE ORAL
Qty: 90 TABLET | Refills: 0 | Status: SHIPPED | OUTPATIENT
Start: 2020-04-20 | End: 2020-07-13

## 2020-04-22 ENCOUNTER — TELEPHONE (OUTPATIENT)
Dept: INTERNAL MEDICINE | Facility: CLINIC | Age: 80
End: 2020-04-22

## 2020-04-22 NOTE — TELEPHONE ENCOUNTER
----- Message from Talia Campbell sent at 4/22/2020  8:22 AM CDT -----  Contact: self  Type:  Patient Returning Call    Who Called:Nerissa  Who Left Message for Patient:  Does the patient know what this is regarding?:  Would the patient rather a call back or a response via [facility]? call  Best Call Back Number:[phone]  Additional Information:

## 2020-04-24 ENCOUNTER — OFFICE VISIT (OUTPATIENT)
Dept: INTERNAL MEDICINE | Facility: CLINIC | Age: 80
End: 2020-04-24
Payer: MEDICARE

## 2020-04-24 DIAGNOSIS — Z83.2 FAMILY HISTORY OF FACTOR V LEIDEN MUTATION: ICD-10-CM

## 2020-04-24 DIAGNOSIS — E78.49 OTHER HYPERLIPIDEMIA: ICD-10-CM

## 2020-04-24 DIAGNOSIS — F32.A DEPRESSION, UNSPECIFIED DEPRESSION TYPE: ICD-10-CM

## 2020-04-24 DIAGNOSIS — I10 ESSENTIAL HYPERTENSION: Primary | ICD-10-CM

## 2020-04-24 DIAGNOSIS — M85.80 OSTEOPENIA, UNSPECIFIED LOCATION: ICD-10-CM

## 2020-04-24 DIAGNOSIS — F41.9 ANXIETY: ICD-10-CM

## 2020-04-24 DIAGNOSIS — I48.0 PAF (PAROXYSMAL ATRIAL FIBRILLATION): ICD-10-CM

## 2020-04-24 PROCEDURE — 99214 OFFICE O/P EST MOD 30 MIN: CPT | Mod: 95,,, | Performed by: INTERNAL MEDICINE

## 2020-04-24 PROCEDURE — 99214 PR OFFICE/OUTPT VISIT, EST, LEVL IV, 30-39 MIN: ICD-10-PCS | Mod: 95,,, | Performed by: INTERNAL MEDICINE

## 2020-04-24 NOTE — PROGRESS NOTES
Subjective:      Patient ID: Nerissa Burnham is a 80 y.o. female.    Chief Complaint: Follow-up    HPI   The patient location is: home  The chief complaint leading to consultation is: mult med problems  Visit type: audiovisual  Total time spent with patient: 20 min  Each patient to whom he or she provides medical services by telemedicine is:  (1) informed of the relationship between the physician and patient and the respective role of any other health care provider with respect to management of the patient; and (2) notified that he or she may decline to receive medical services by telemedicine and may withdraw from such care at any time.    Notes:     81 yo with   Patient Active Problem List   Diagnosis    Hypertension    Hyperlipidemia    Depression    Osteopenia    HSV-2 infection    Anxiety    GERD (gastroesophageal reflux disease)    Palpitation    PAF (paroxysmal atrial fibrillation)    Diarrhea    Family history of factor V Leiden mutation     Past Medical History:   Diagnosis Date    Depression     Encounter for blood transfusion     Hyperlipidemia     Hypertension        Review of Systems   Constitutional: Negative for activity change and unexpected weight change.   HENT: Negative for hearing loss, rhinorrhea and trouble swallowing.    Eyes: Negative for discharge and visual disturbance.   Respiratory: Negative for chest tightness and wheezing.    Cardiovascular: Negative for palpitations.   Gastrointestinal: Negative for blood in stool, constipation and diarrhea.   Endocrine: Negative for polydipsia and polyuria.   Genitourinary: Negative for difficulty urinating, dysuria, hematuria and menstrual problem.   Psychiatric/Behavioral: Negative for confusion and dysphoric mood. The patient is not nervous/anxious.      Objective:   There were no vitals taken for this visit.    Physical Exam   Constitutional: She is oriented to person, place, and time. She appears well-developed and well-nourished.    Eyes: Conjunctivae and EOM are normal.   Pulmonary/Chest: Effort normal.   Neurological: She is alert and oriented to person, place, and time.   Psychiatric: She has a normal mood and affect. Her behavior is normal.       Assessment:     1. Essential hypertension    2. PAF (paroxysmal atrial fibrillation)    3. Depression, unspecified depression type    4. Anxiety    5. Other hyperlipidemia    6. Osteopenia, unspecified location    7. Family history of factor V Leiden mutation      Plan:   Essential hypertension  controlled  -     Comprehensive metabolic panel; Future; Expected date: 10/21/2020  -     TSH; Future; Expected date: 10/21/2020  -     CBC auto differential; Future; Expected date: 10/21/2020    PAF (paroxysmal atrial fibrillation)  asymptomatie  Depression, unspecified depression type  stable  Anxiety  stable  Other hyperlipidemia  -     Lipid panel; Future; Expected date: 10/21/2020    Osteopenia, unspecified location  -     Vitamin D; Future; Expected date: 10/21/2020    Family history of factor V Leiden mutation  Sister and niece recently diagnosed  Pt without h/o clotting  Pt currently anticoagulated due to afib  -     FACTOR 5 LEIDEN; Future; Expected date: 04/24/2020    up to date with dxa        Problem List Items Addressed This Visit        Psychiatric    Depression    Anxiety       Cardiac/Vascular    Hypertension - Primary    Relevant Orders    Comprehensive metabolic panel    TSH    CBC auto differential    Hyperlipidemia    Relevant Orders    Lipid panel    PAF (paroxysmal atrial fibrillation)       Orthopedic    Osteopenia    Relevant Orders    Vitamin D       Other    Family history of factor V Leiden mutation    Relevant Orders    FACTOR 5 LEIDEN          No follow-ups on file.

## 2020-04-30 ENCOUNTER — EXTERNAL CHRONIC CARE MANAGEMENT (OUTPATIENT)
Dept: PRIMARY CARE CLINIC | Facility: CLINIC | Age: 80
End: 2020-04-30
Payer: MEDICARE

## 2020-04-30 PROCEDURE — 99490 CHRNC CARE MGMT STAFF 1ST 20: CPT | Mod: S$PBB,,, | Performed by: INTERNAL MEDICINE

## 2020-04-30 PROCEDURE — 99490 PR CHRONIC CARE MGMT, 1ST 20 MIN: ICD-10-PCS | Mod: S$PBB,,, | Performed by: INTERNAL MEDICINE

## 2020-04-30 PROCEDURE — 99490 CHRNC CARE MGMT STAFF 1ST 20: CPT | Mod: PBBFAC | Performed by: INTERNAL MEDICINE

## 2020-05-23 DIAGNOSIS — B00.9 HSV-2 INFECTION: ICD-10-CM

## 2020-05-24 RX ORDER — VALACYCLOVIR HYDROCHLORIDE 500 MG/1
TABLET, FILM COATED ORAL
Qty: 180 TABLET | Refills: 1 | Status: SHIPPED | OUTPATIENT
Start: 2020-05-24 | End: 2020-10-30 | Stop reason: SDUPTHER

## 2020-05-31 ENCOUNTER — EXTERNAL CHRONIC CARE MANAGEMENT (OUTPATIENT)
Dept: PRIMARY CARE CLINIC | Facility: CLINIC | Age: 80
End: 2020-05-31
Payer: MEDICARE

## 2020-05-31 PROCEDURE — 99490 CHRNC CARE MGMT STAFF 1ST 20: CPT | Mod: PBBFAC | Performed by: INTERNAL MEDICINE

## 2020-05-31 PROCEDURE — 99490 CHRNC CARE MGMT STAFF 1ST 20: CPT | Mod: S$PBB,,, | Performed by: INTERNAL MEDICINE

## 2020-05-31 PROCEDURE — 99490 PR CHRONIC CARE MGMT, 1ST 20 MIN: ICD-10-PCS | Mod: S$PBB,,, | Performed by: INTERNAL MEDICINE

## 2020-06-01 ENCOUNTER — TELEPHONE (OUTPATIENT)
Dept: INTERNAL MEDICINE | Facility: CLINIC | Age: 80
End: 2020-06-01

## 2020-06-01 NOTE — TELEPHONE ENCOUNTER
----- Message from Margo Mullen sent at 6/1/2020  8:44 AM CDT -----  Contact: pt   Would like to consult with nurse regarding something personal, will not give any additional information. Please give a call back at [phone].            Thanks,  Margo ROMO

## 2020-06-29 ENCOUNTER — LAB VISIT (OUTPATIENT)
Dept: LAB | Facility: HOSPITAL | Age: 80
End: 2020-06-29
Attending: INTERNAL MEDICINE
Payer: MEDICARE

## 2020-06-29 DIAGNOSIS — Z83.2 FAMILY HISTORY OF FACTOR V LEIDEN MUTATION: ICD-10-CM

## 2020-06-29 PROCEDURE — 36415 COLL VENOUS BLD VENIPUNCTURE: CPT

## 2020-06-30 ENCOUNTER — EXTERNAL CHRONIC CARE MANAGEMENT (OUTPATIENT)
Dept: PRIMARY CARE CLINIC | Facility: CLINIC | Age: 80
End: 2020-06-30
Payer: MEDICARE

## 2020-06-30 PROCEDURE — 99490 CHRNC CARE MGMT STAFF 1ST 20: CPT | Mod: S$PBB,,, | Performed by: INTERNAL MEDICINE

## 2020-06-30 PROCEDURE — 99490 CHRNC CARE MGMT STAFF 1ST 20: CPT | Mod: PBBFAC | Performed by: INTERNAL MEDICINE

## 2020-06-30 PROCEDURE — 99490 PR CHRONIC CARE MGMT, 1ST 20 MIN: ICD-10-PCS | Mod: S$PBB,,, | Performed by: INTERNAL MEDICINE

## 2020-07-07 LAB — F5 P.R506Q BLD/T QL: ABNORMAL

## 2020-07-07 PROCEDURE — 81241 F5 GENE: CPT

## 2020-07-31 ENCOUNTER — EXTERNAL CHRONIC CARE MANAGEMENT (OUTPATIENT)
Dept: PRIMARY CARE CLINIC | Facility: CLINIC | Age: 80
End: 2020-07-31
Payer: MEDICARE

## 2020-07-31 PROCEDURE — 99490 CHRNC CARE MGMT STAFF 1ST 20: CPT | Mod: PBBFAC | Performed by: INTERNAL MEDICINE

## 2020-07-31 PROCEDURE — 99490 PR CHRONIC CARE MGMT, 1ST 20 MIN: ICD-10-PCS | Mod: S$PBB,,, | Performed by: INTERNAL MEDICINE

## 2020-07-31 PROCEDURE — 99490 CHRNC CARE MGMT STAFF 1ST 20: CPT | Mod: S$PBB,,, | Performed by: INTERNAL MEDICINE

## 2020-08-31 ENCOUNTER — EXTERNAL CHRONIC CARE MANAGEMENT (OUTPATIENT)
Dept: PRIMARY CARE CLINIC | Facility: CLINIC | Age: 80
End: 2020-08-31
Payer: MEDICARE

## 2020-08-31 PROCEDURE — 99490 CHRNC CARE MGMT STAFF 1ST 20: CPT | Mod: PBBFAC | Performed by: INTERNAL MEDICINE

## 2020-08-31 PROCEDURE — 99490 PR CHRONIC CARE MGMT, 1ST 20 MIN: ICD-10-PCS | Mod: S$PBB,,, | Performed by: INTERNAL MEDICINE

## 2020-08-31 PROCEDURE — 99490 CHRNC CARE MGMT STAFF 1ST 20: CPT | Mod: S$PBB,,, | Performed by: INTERNAL MEDICINE

## 2020-09-04 ENCOUNTER — PATIENT OUTREACH (OUTPATIENT)
Dept: ADMINISTRATIVE | Facility: OTHER | Age: 80
End: 2020-09-04

## 2020-09-09 DIAGNOSIS — I48.0 PAF (PAROXYSMAL ATRIAL FIBRILLATION): Primary | ICD-10-CM

## 2020-09-10 ENCOUNTER — OFFICE VISIT (OUTPATIENT)
Dept: CARDIOLOGY | Facility: CLINIC | Age: 80
End: 2020-09-10
Payer: MEDICARE

## 2020-09-10 ENCOUNTER — HOSPITAL ENCOUNTER (OUTPATIENT)
Dept: CARDIOLOGY | Facility: HOSPITAL | Age: 80
Discharge: HOME | End: 2020-09-10
Attending: INTERNAL MEDICINE
Payer: MEDICARE

## 2020-09-10 VITALS
BODY MASS INDEX: 22.13 KG/M2 | OXYGEN SATURATION: 98 % | HEART RATE: 57 BPM | SYSTOLIC BLOOD PRESSURE: 134 MMHG | WEIGHT: 133 LBS | DIASTOLIC BLOOD PRESSURE: 62 MMHG

## 2020-09-10 DIAGNOSIS — I35.1 NONRHEUMATIC AORTIC VALVE INSUFFICIENCY: ICD-10-CM

## 2020-09-10 DIAGNOSIS — Z83.2 FAMILY HISTORY OF FACTOR V LEIDEN MUTATION: ICD-10-CM

## 2020-09-10 DIAGNOSIS — E78.2 MIXED HYPERLIPIDEMIA: ICD-10-CM

## 2020-09-10 DIAGNOSIS — I48.0 PAF (PAROXYSMAL ATRIAL FIBRILLATION): ICD-10-CM

## 2020-09-10 DIAGNOSIS — I48.0 PAF (PAROXYSMAL ATRIAL FIBRILLATION): Primary | ICD-10-CM

## 2020-09-10 DIAGNOSIS — I10 ESSENTIAL HYPERTENSION: ICD-10-CM

## 2020-09-10 PROCEDURE — 99213 OFFICE O/P EST LOW 20 MIN: CPT | Mod: PBBFAC,25 | Performed by: INTERNAL MEDICINE

## 2020-09-10 PROCEDURE — 99214 OFFICE O/P EST MOD 30 MIN: CPT | Mod: S$PBB,,, | Performed by: INTERNAL MEDICINE

## 2020-09-10 PROCEDURE — 93010 EKG 12-LEAD: ICD-10-PCS | Mod: ,,, | Performed by: INTERNAL MEDICINE

## 2020-09-10 PROCEDURE — 93010 ELECTROCARDIOGRAM REPORT: CPT | Mod: ,,, | Performed by: INTERNAL MEDICINE

## 2020-09-10 PROCEDURE — 93005 ELECTROCARDIOGRAM TRACING: CPT

## 2020-09-10 PROCEDURE — 99214 PR OFFICE/OUTPT VISIT, EST, LEVL IV, 30-39 MIN: ICD-10-PCS | Mod: S$PBB,,, | Performed by: INTERNAL MEDICINE

## 2020-09-10 PROCEDURE — 99999 PR PBB SHADOW E&M-EST. PATIENT-LVL III: ICD-10-PCS | Mod: PBBFAC,,, | Performed by: INTERNAL MEDICINE

## 2020-09-10 PROCEDURE — 99999 PR PBB SHADOW E&M-EST. PATIENT-LVL III: CPT | Mod: PBBFAC,,, | Performed by: INTERNAL MEDICINE

## 2020-09-10 RX ORDER — LANOLIN ALCOHOL/MO/W.PET/CERES
1000 CREAM (GRAM) TOPICAL DAILY
COMMUNITY
Start: 2020-08-28 | End: 2021-10-14

## 2020-09-10 NOTE — PROGRESS NOTES
Subjective:   Patient ID:  Nerissa Burnham is a 80 y.o. female who presents for follow up of No chief complaint on file.      81 yo female 6 months f/u  PMH PAF, HTN, HLD and depression.  No smoking.  passed away in . Since then, the palpitation seems worse.    ECHo showed normal EF, mild AI, LAE and garde I DD  holter showed PAF 7% burden in . ToprolXL 25 mg added.    Palpitation once a month. And could last for 3 hours. No associated dizziness faint and SOB. Lying on left side made it worse. Occurred at night.  Quit alcohol now.  No faint, dizziness and chest pain/dyspnea.  Sometime feels knees imbalanced but no fall. More at night when uses the bathroom.  Today EKG NSR   BP LDL and BS controlled        Past Medical History:   Diagnosis Date    Depression     Encounter for blood transfusion     Hyperlipidemia     Hypertension        Past Surgical History:   Procedure Laterality Date    AUGMENTATION OF BREAST      BREAST SURGERY Bilateral     Augmentation    COLONOSCOPY N/A 1/10/2020    Procedure: COLONOSCOPY;  Surgeon: Donna Pruitt MD;  Location: [facility];  Service: Endoscopy;  Laterality: N/A;    HYSTERECTOMY      OOPHORECTOMY         Social History     Tobacco Use    Smoking status: Never Smoker    Smokeless tobacco: Never Used   Substance Use Topics    Alcohol use: Yes     Frequency: 2-4 times a month     Drinks per session: 1 or 2     Binge frequency: Never     Comment: socially    Drug use: No       Family History   Problem Relation Age of Onset    Cancer Mother         Breast    Breast cancer Mother     No Known Problems Father     Breast cancer Maternal Aunt          Review of Systems   Constitution: Negative for decreased appetite, diaphoresis, fever, malaise/fatigue and night sweats.   HENT: Negative for nosebleeds.    Eyes: Negative for blurred vision and double vision.   Cardiovascular: Positive for palpitations. Negative for chest pain, claudication,  dyspnea on exertion, irregular heartbeat, leg swelling, near-syncope, orthopnea, paroxysmal nocturnal dyspnea and syncope.   Respiratory: Negative for cough, shortness of breath, sleep disturbances due to breathing, snoring, sputum production and wheezing.    Endocrine: Negative for cold intolerance and polyuria.   Hematologic/Lymphatic: Does not bruise/bleed easily.   Skin: Negative for rash.   Musculoskeletal: Negative for back pain, falls, joint pain, joint swelling and neck pain.   Gastrointestinal: Negative for abdominal pain, heartburn, nausea and vomiting.   Genitourinary: Negative for dysuria, frequency and hematuria.   Neurological: Negative for difficulty with concentration, dizziness, focal weakness, headaches, light-headedness, numbness, seizures and weakness.   Psychiatric/Behavioral: Negative for depression, memory loss and substance abuse. The patient does not have insomnia.    Allergic/Immunologic: Negative for HIV exposure and hives.       Objective:   Physical Exam   Constitutional: She is oriented to person, place, and time. She appears well-nourished.   HENT:   Head: Normocephalic.   Eyes: Pupils are equal, round, and reactive to light.   Neck: Normal carotid pulses and no JVD present. Carotid bruit is not present. No thyromegaly present.   Cardiovascular: Normal rate, regular rhythm and normal pulses.  No extrasystoles are present. PMI is not displaced. Exam reveals no gallop and no S3.   Murmur heard.  1/6 ESM on URSB   Pulmonary/Chest: Breath sounds normal. No stridor. No respiratory distress.   Wheezing on right   Abdominal: Soft. Bowel sounds are normal. There is no abdominal tenderness. There is no rebound.   Musculoskeletal: Normal range of motion.   Neurological: She is alert and oriented to person, place, and time.   Skin: Skin is intact. No rash noted.   Psychiatric: Her behavior is normal.       Lab Results   Component Value Date    CHOL 155 10/25/2019    CHOL 181 10/17/2018     Lab  Results   Component Value Date    HDL 58 10/25/2019    HDL 74 10/17/2018     Lab Results   Component Value Date    LDLCALC 86.2 10/25/2019    LDLCALC 91.8 10/17/2018     Lab Results   Component Value Date    TRIG 54 10/25/2019    TRIG 76 10/17/2018     Lab Results   Component Value Date    CHOLHDL 37.4 10/25/2019    CHOLHDL 40.9 10/17/2018       Chemistry        Component Value Date/Time     10/25/2019 0824    K 4.6 10/25/2019 0824     10/25/2019 0824    CO2 28 10/25/2019 0824    BUN 23 10/25/2019 0824    CREATININE 1.0 10/25/2019 0824    GLU 95 10/25/2019 0824        Component Value Date/Time    CALCIUM 9.6 10/25/2019 0824    ALKPHOS 40 (L) 10/25/2019 0824    AST 16 10/25/2019 0824    ALT 10 10/25/2019 0824    BILITOT 0.4 10/25/2019 0824    ESTGFRAFRICA >60.0 10/25/2019 0824    EGFRNONAA 54.5 (A) 10/25/2019 0824          No results found for: LABA1C, HGBA1C  Lab Results   Component Value Date    TSH 1.862 10/25/2019     No results found for: INR, PROTIME  Lab Results   Component Value Date    WBC 5.16 10/25/2019    HGB 12.6 10/25/2019    HCT 38.8 10/25/2019    MCV 98 10/25/2019     10/25/2019     BMP  Sodium   Date Value Ref Range Status   10/25/2019 144 136 - 145 mmol/L Final     Potassium   Date Value Ref Range Status   10/25/2019 4.6 3.5 - 5.1 mmol/L Final     Chloride   Date Value Ref Range Status   10/25/2019 109 95 - 110 mmol/L Final     CO2   Date Value Ref Range Status   10/25/2019 28 23 - 29 mmol/L Final     BUN, Bld   Date Value Ref Range Status   10/25/2019 23 8 - 23 mg/dL Final     Creatinine   Date Value Ref Range Status   10/25/2019 1.0 0.5 - 1.4 mg/dL Final     Calcium   Date Value Ref Range Status   10/25/2019 9.6 8.7 - 10.5 mg/dL Final     Anion Gap   Date Value Ref Range Status   10/25/2019 7 (L) 8 - 16 mmol/L Final     eGFR if    Date Value Ref Range Status   10/25/2019 >60.0 >60 mL/min/1.73 m^2 Final     eGFR if non    Date Value Ref Range  Status   10/25/2019 54.5 (A) >60 mL/min/1.73 m^2 Final     Comment:     Calculation used to obtain the estimated glomerular filtration  rate (eGFR) is the CKD-EPI equation.        BNP  @LABRCNTIP(BNP,BNPTRIAGEBLO)@  @LABRCNTIP(troponini)@  CrCl cannot be calculated (Patient's most recent lab result is older than the maximum 7 days allowed.).  No results found in the last 24 hours.  No results found in the last 24 hours.  No results found in the last 24 hours.    Assessment:      1. PAF (paroxysmal atrial fibrillation)    2. Essential hypertension    3. Mixed hyperlipidemia    4. Family history of factor V Leiden mutation    5. Nonrheumatic aortic valve insufficiency      EKG sinus bradycardia HR 53 bpm  Plan:   Repeat echo for AI  Continue ToprolXL 25 mg Ramilrpil 2.5 mg and Statin  Counseled DASH  Check Lipid profile in 6 months  Recommend heart-healthy diet, weight control and regular exercise.  Dina. Risk modification.   RTC in 6 months    I have reviewed all pertinent labs and cardiac studies independently. Plans and recommendations have been formulated under my direct supervision. All questions answered and patient voiced understanding.   If symptoms persist go to the ED

## 2020-09-11 ENCOUNTER — HOSPITAL ENCOUNTER (OUTPATIENT)
Dept: CARDIOLOGY | Facility: HOSPITAL | Age: 80
Discharge: HOME | End: 2020-09-11
Attending: INTERNAL MEDICINE
Payer: MEDICARE

## 2020-09-11 VITALS — HEIGHT: 65 IN | BODY MASS INDEX: 22.13 KG/M2

## 2020-09-11 DIAGNOSIS — I35.1 NONRHEUMATIC AORTIC VALVE INSUFFICIENCY: ICD-10-CM

## 2020-09-11 PROCEDURE — 93306 TTE W/DOPPLER COMPLETE: CPT

## 2020-09-11 PROCEDURE — 93306 TTE W/DOPPLER COMPLETE: CPT | Mod: 26,,, | Performed by: INTERNAL MEDICINE

## 2020-09-11 PROCEDURE — 93306 ECHO (CUPID ONLY): ICD-10-PCS | Mod: 26,,, | Performed by: INTERNAL MEDICINE

## 2020-09-13 LAB
AV INDEX (PROSTH): 0.76
AV MEAN GRADIENT: 9 MMHG
AV PEAK GRADIENT: 17 MMHG
AV VALVE AREA: 2.33 CM2
AV VELOCITY RATIO: 0.76
CV ECHO LV RWT: 0.58 CM
DOP CALC AO PEAK VEL: 2.06 M/S
DOP CALC AO VTI: 51.59 CM
DOP CALC LVOT AREA: 3.1 CM2
DOP CALC LVOT DIAMETER: 1.98 CM
DOP CALC LVOT PEAK VEL: 1.57 M/S
DOP CALC LVOT STROKE VOLUME: 119.99 CM3
DOP CALC RVOT PEAK VEL: 1.24 M/S
DOP CALC RVOT VTI: 28.4 CM
DOP CALCLVOT PEAK VEL VTI: 38.99 CM
E WAVE DECELERATION TIME: 242.65 MSEC
E/A RATIO: 0.69
E/E' RATIO: 12.31 M/S
ECHO LV POSTERIOR WALL: 1.11 CM (ref 0.6–1.1)
FRACTIONAL SHORTENING: 36 % (ref 28–44)
INTERVENTRICULAR SEPTUM: 1.24 CM (ref 0.6–1.1)
LA MAJOR: 4.86 CM
LA MINOR: 4.7 CM
LA WIDTH: 3.57 CM
LEFT ATRIUM SIZE: 3.66 CM
LEFT ATRIUM VOLUME: 53.07 CM3
LEFT INTERNAL DIMENSION IN SYSTOLE: 2.45 CM (ref 2.1–4)
LEFT VENTRICLE DIASTOLIC VOLUME: 62.04 ML
LEFT VENTRICLE SYSTOLIC VOLUME: 21.23 ML
LEFT VENTRICULAR INTERNAL DIMENSION IN DIASTOLE: 3.8 CM (ref 3.5–6)
LEFT VENTRICULAR MASS: 148.48 G
LV LATERAL E/E' RATIO: 11.43 M/S
LV SEPTAL E/E' RATIO: 13.33 M/S
MV PEAK A VEL: 1.16 M/S
MV PEAK E VEL: 0.8 M/S
PISA TR MAX VEL: 2.88 M/S
PV MEAN GRADIENT: 3.26 MMHG
PV PEAK VELOCITY: 1.24 CM/S
RA MAJOR: 4.16 CM
RA WIDTH: 2.65 CM
RIGHT VENTRICULAR END-DIASTOLIC DIMENSION: 2.43 CM
SINUS: 2.24 CM
STJ: 2.38 CM
TDI LATERAL: 0.07 M/S
TDI SEPTAL: 0.06 M/S
TDI: 0.07 M/S
TR MAX PG: 33 MMHG
TRICUSPID ANNULAR PLANE SYSTOLIC EXCURSION: 2.1 CM

## 2020-09-15 ENCOUNTER — TELEPHONE (OUTPATIENT)
Dept: CARDIOLOGY | Facility: CLINIC | Age: 80
End: 2020-09-15

## 2020-09-15 NOTE — TELEPHONE ENCOUNTER
LVM to return call to the clinic to return call to the clinic.    Echo showed  Normal EF and mild AI

## 2020-09-29 ENCOUNTER — PATIENT MESSAGE (OUTPATIENT)
Dept: OTHER | Facility: OTHER | Age: 80
End: 2020-09-29

## 2020-09-30 ENCOUNTER — EXTERNAL CHRONIC CARE MANAGEMENT (OUTPATIENT)
Dept: PRIMARY CARE CLINIC | Facility: CLINIC | Age: 80
End: 2020-09-30
Payer: MEDICARE

## 2020-09-30 PROCEDURE — 99490 CHRNC CARE MGMT STAFF 1ST 20: CPT | Mod: PBBFAC | Performed by: INTERNAL MEDICINE

## 2020-09-30 PROCEDURE — 99490 CHRNC CARE MGMT STAFF 1ST 20: CPT | Mod: S$PBB,,, | Performed by: INTERNAL MEDICINE

## 2020-09-30 PROCEDURE — 99490 PR CHRONIC CARE MGMT, 1ST 20 MIN: ICD-10-PCS | Mod: S$PBB,,, | Performed by: INTERNAL MEDICINE

## 2020-10-28 ENCOUNTER — TELEPHONE (OUTPATIENT)
Dept: CARDIOLOGY | Facility: CLINIC | Age: 80
End: 2020-10-28

## 2020-10-28 NOTE — TELEPHONE ENCOUNTER
Pt contacted about results, pt verbalized understanding.            ----- Message from Latrice Child sent at 10/28/2020 10:35 AM CDT -----  Regarding: JIMMY GOULD/ CHELO WILLIAMSON  Contact: [facility]  PLEASE CALL PATIENT WITH ECHO RESULTS FROM 9/11/20 @ [phone]. THANKS        Echo showed  Normal EF and mild AI

## 2020-10-29 ENCOUNTER — LAB VISIT (OUTPATIENT)
Dept: LAB | Facility: HOSPITAL | Age: 80
End: 2020-10-29
Attending: INTERNAL MEDICINE
Payer: MEDICARE

## 2020-10-29 DIAGNOSIS — M85.80 OSTEOPENIA, UNSPECIFIED LOCATION: ICD-10-CM

## 2020-10-29 DIAGNOSIS — I10 ESSENTIAL HYPERTENSION: ICD-10-CM

## 2020-10-29 DIAGNOSIS — E78.49 OTHER HYPERLIPIDEMIA: ICD-10-CM

## 2020-10-29 LAB
25(OH)D3+25(OH)D2 SERPL-MCNC: 52 NG/ML (ref 30–96)
ALBUMIN SERPL BCP-MCNC: 4 G/DL (ref 3.5–5.2)
ALP SERPL-CCNC: 40 U/L (ref 55–135)
ALT SERPL W/O P-5'-P-CCNC: 9 U/L (ref 10–44)
ANION GAP SERPL CALC-SCNC: 8 MMOL/L (ref 8–16)
AST SERPL-CCNC: 17 U/L (ref 10–40)
BASOPHILS # BLD AUTO: 0.04 K/UL (ref 0–0.2)
BASOPHILS NFR BLD: 0.8 % (ref 0–1.9)
BILIRUB SERPL-MCNC: 0.5 MG/DL (ref 0.1–1)
BUN SERPL-MCNC: 21 MG/DL (ref 8–23)
CALCIUM SERPL-MCNC: 9.2 MG/DL (ref 8.7–10.5)
CHLORIDE SERPL-SCNC: 106 MMOL/L (ref 95–110)
CHOLEST SERPL-MCNC: 155 MG/DL (ref 120–199)
CHOLEST/HDLC SERPL: 2.7 {RATIO} (ref 2–5)
CO2 SERPL-SCNC: 27 MMOL/L (ref 23–29)
CREAT SERPL-MCNC: 0.9 MG/DL (ref 0.5–1.4)
DIFFERENTIAL METHOD: ABNORMAL
EOSINOPHIL # BLD AUTO: 0.2 K/UL (ref 0–0.5)
EOSINOPHIL NFR BLD: 2.9 % (ref 0–8)
ERYTHROCYTE [DISTWIDTH] IN BLOOD BY AUTOMATED COUNT: 12 % (ref 11.5–14.5)
EST. GFR  (AFRICAN AMERICAN): >60 ML/MIN/1.73 M^2
EST. GFR  (NON AFRICAN AMERICAN): >60 ML/MIN/1.73 M^2
GLUCOSE SERPL-MCNC: 89 MG/DL (ref 70–110)
HCT VFR BLD AUTO: 40.6 % (ref 37–48.5)
HDLC SERPL-MCNC: 58 MG/DL (ref 40–75)
HDLC SERPL: 37.4 % (ref 20–50)
HGB BLD-MCNC: 12.5 G/DL (ref 12–16)
IMM GRANULOCYTES # BLD AUTO: 0.01 K/UL (ref 0–0.04)
IMM GRANULOCYTES NFR BLD AUTO: 0.2 % (ref 0–0.5)
LDLC SERPL CALC-MCNC: 83 MG/DL (ref 63–159)
LYMPHOCYTES # BLD AUTO: 1.3 K/UL (ref 1–4.8)
LYMPHOCYTES NFR BLD: 24.4 % (ref 18–48)
MCH RBC QN AUTO: 31.4 PG (ref 27–31)
MCHC RBC AUTO-ENTMCNC: 30.8 G/DL (ref 32–36)
MCV RBC AUTO: 102 FL (ref 82–98)
MONOCYTES # BLD AUTO: 0.7 K/UL (ref 0.3–1)
MONOCYTES NFR BLD: 12.7 % (ref 4–15)
NEUTROPHILS # BLD AUTO: 3.1 K/UL (ref 1.8–7.7)
NEUTROPHILS NFR BLD: 59 % (ref 38–73)
NONHDLC SERPL-MCNC: 97 MG/DL
NRBC BLD-RTO: 0 /100 WBC
PLATELET # BLD AUTO: 252 K/UL (ref 150–350)
PMV BLD AUTO: 11.1 FL (ref 9.2–12.9)
POTASSIUM SERPL-SCNC: 4.5 MMOL/L (ref 3.5–5.1)
PROT SERPL-MCNC: 6.5 G/DL (ref 6–8.4)
RBC # BLD AUTO: 3.98 M/UL (ref 4–5.4)
SODIUM SERPL-SCNC: 141 MMOL/L (ref 136–145)
TRIGL SERPL-MCNC: 70 MG/DL (ref 30–150)
TSH SERPL DL<=0.005 MIU/L-ACNC: 2 UIU/ML (ref 0.4–4)
WBC # BLD AUTO: 5.21 K/UL (ref 3.9–12.7)

## 2020-10-29 PROCEDURE — 84443 ASSAY THYROID STIM HORMONE: CPT

## 2020-10-29 PROCEDURE — 85025 COMPLETE CBC W/AUTO DIFF WBC: CPT

## 2020-10-29 PROCEDURE — 80053 COMPREHEN METABOLIC PANEL: CPT

## 2020-10-29 PROCEDURE — 80061 LIPID PANEL: CPT

## 2020-10-29 PROCEDURE — 82306 VITAMIN D 25 HYDROXY: CPT

## 2020-10-29 PROCEDURE — 36415 COLL VENOUS BLD VENIPUNCTURE: CPT

## 2020-10-30 ENCOUNTER — OFFICE VISIT (OUTPATIENT)
Dept: INTERNAL MEDICINE | Facility: CLINIC | Age: 80
End: 2020-10-30
Payer: MEDICARE

## 2020-10-30 VITALS
HEART RATE: 66 BPM | DIASTOLIC BLOOD PRESSURE: 56 MMHG | SYSTOLIC BLOOD PRESSURE: 110 MMHG | OXYGEN SATURATION: 96 % | WEIGHT: 136.25 LBS | HEIGHT: 65 IN | BODY MASS INDEX: 22.7 KG/M2 | TEMPERATURE: 97 F

## 2020-10-30 DIAGNOSIS — E78.2 MIXED HYPERLIPIDEMIA: ICD-10-CM

## 2020-10-30 DIAGNOSIS — E78.5 HYPERLIPIDEMIA, UNSPECIFIED HYPERLIPIDEMIA TYPE: ICD-10-CM

## 2020-10-30 DIAGNOSIS — F32.A DEPRESSION, UNSPECIFIED DEPRESSION TYPE: ICD-10-CM

## 2020-10-30 DIAGNOSIS — M85.80 OSTEOPENIA, UNSPECIFIED LOCATION: ICD-10-CM

## 2020-10-30 DIAGNOSIS — B00.9 HSV-2 INFECTION: ICD-10-CM

## 2020-10-30 DIAGNOSIS — F41.9 ANXIETY: ICD-10-CM

## 2020-10-30 DIAGNOSIS — I48.0 PAF (PAROXYSMAL ATRIAL FIBRILLATION): ICD-10-CM

## 2020-10-30 DIAGNOSIS — I10 ESSENTIAL HYPERTENSION: Primary | ICD-10-CM

## 2020-10-30 PROCEDURE — 99999 PR PBB SHADOW E&M-EST. PATIENT-LVL IV: CPT | Mod: PBBFAC,,, | Performed by: INTERNAL MEDICINE

## 2020-10-30 PROCEDURE — 99214 OFFICE O/P EST MOD 30 MIN: CPT | Mod: S$PBB,,, | Performed by: INTERNAL MEDICINE

## 2020-10-30 PROCEDURE — 99999 PR PBB SHADOW E&M-EST. PATIENT-LVL IV: ICD-10-PCS | Mod: PBBFAC,,, | Performed by: INTERNAL MEDICINE

## 2020-10-30 PROCEDURE — 99214 PR OFFICE/OUTPT VISIT, EST, LEVL IV, 30-39 MIN: ICD-10-PCS | Mod: S$PBB,,, | Performed by: INTERNAL MEDICINE

## 2020-10-30 PROCEDURE — 99214 OFFICE O/P EST MOD 30 MIN: CPT | Mod: PBBFAC | Performed by: INTERNAL MEDICINE

## 2020-10-30 RX ORDER — ATORVASTATIN CALCIUM 10 MG/1
10 TABLET, FILM COATED ORAL DAILY
Qty: 90 TABLET | Refills: 2 | Status: SHIPPED | OUTPATIENT
Start: 2020-10-30 | End: 2021-07-13 | Stop reason: SDUPTHER

## 2020-10-30 RX ORDER — VALACYCLOVIR HYDROCHLORIDE 500 MG/1
500 TABLET, FILM COATED ORAL DAILY
Qty: 90 TABLET | Refills: 2 | Status: SHIPPED | OUTPATIENT
Start: 2020-10-30 | End: 2021-07-12

## 2020-10-30 RX ORDER — ESCITALOPRAM OXALATE 20 MG/1
20 TABLET ORAL DAILY
Qty: 90 TABLET | Refills: 2 | Status: SHIPPED | OUTPATIENT
Start: 2020-10-30 | End: 2020-11-30 | Stop reason: SDUPTHER

## 2020-10-30 RX ORDER — ALENDRONATE SODIUM 70 MG/1
70 TABLET ORAL
Qty: 12 TABLET | Refills: 3 | Status: SHIPPED | OUTPATIENT
Start: 2020-10-30 | End: 2020-12-30

## 2020-10-30 RX ORDER — IBUPROFEN 100 MG/5ML
1000 SUSPENSION, ORAL (FINAL DOSE FORM) ORAL DAILY
COMMUNITY
End: 2024-03-26

## 2020-10-30 NOTE — PROGRESS NOTES
"Subjective:      Patient ID: Nerissa Burnham is a 80 y.o. female.    Chief Complaint: Follow-up    HPI     79 yo with   Patient Active Problem List   Diagnosis    Hypertension    Hyperlipidemia    Depression    Osteopenia    HSV-2 infection    Anxiety    GERD (gastroesophageal reflux disease)    Palpitation    PAF (paroxysmal atrial fibrillation)    Diarrhea    Family history of factor V Leiden mutation    Nonrheumatic aortic valve insufficiency     Past Medical History:   Diagnosis Date    Depression     Encounter for blood transfusion     Hyperlipidemia     Hypertension      Here today for management with mutl med problems as outlined below. Present for months to years.  Compliant with meds without significant side effects.     Review of Systems   Constitutional: Negative for chills and fever.   Respiratory: Negative for cough.    Cardiovascular: Negative for chest pain.   Gastrointestinal: Negative for abdominal pain.     Objective:   BP (!) 110/56 (BP Location: Left arm, Patient Position: Sitting, BP Method: Medium (Manual))   Pulse 66   Temp 97.2 °F (36.2 °C) (Temporal)   Ht 5' 5" (1.651 m)   Wt 61.8 kg (136 lb 3.9 oz)   SpO2 96%   BMI 22.67 kg/m²     Physical Exam  Constitutional:       General: She is not in acute distress.     Appearance: She is well-developed.   HENT:      Head: Normocephalic and atraumatic.      Right Ear: External ear normal.      Left Ear: External ear normal.   Eyes:      Pupils: Pupils are equal, round, and reactive to light.   Neck:      Musculoskeletal: Neck supple.      Thyroid: No thyromegaly.   Cardiovascular:      Rate and Rhythm: Normal rate and regular rhythm.   Pulmonary:      Breath sounds: Normal breath sounds. No wheezing or rales.   Abdominal:      General: Bowel sounds are normal.      Palpations: Abdomen is soft.      Tenderness: There is no abdominal tenderness.   Lymphadenopathy:      Cervical: No cervical adenopathy.   Skin:     General: Skin is " warm and dry.   Neurological:      Mental Status: She is alert and oriented to person, place, and time.   Psychiatric:         Mood and Affect: Mood normal.         Behavior: Behavior normal.       Lab Visit on 10/29/2020   Component Date Value Ref Range Status    Sodium 10/29/2020 141  136 - 145 mmol/L Final    Potassium 10/29/2020 4.5  3.5 - 5.1 mmol/L Final    Chloride 10/29/2020 106  95 - 110 mmol/L Final    CO2 10/29/2020 27  23 - 29 mmol/L Final    Glucose 10/29/2020 89  70 - 110 mg/dL Final    BUN 10/29/2020 21  8 - 23 mg/dL Final    Creatinine 10/29/2020 0.9  0.5 - 1.4 mg/dL Final    Calcium 10/29/2020 9.2  8.7 - 10.5 mg/dL Final    Total Protein 10/29/2020 6.5  6.0 - 8.4 g/dL Final    Albumin 10/29/2020 4.0  3.5 - 5.2 g/dL Final    Total Bilirubin 10/29/2020 0.5  0.1 - 1.0 mg/dL Final    Comment: For infants and newborns, interpretation of results should be based  on gestational age, weight and in agreement with clinical  observations.  Premature Infant recommended reference ranges:  Up to 24 hours.............<8.0 mg/dL  Up to 48 hours............<12.0 mg/dL  3-5 days..................<15.0 mg/dL  6-29 days.................<15.0 mg/dL      Alkaline Phosphatase 10/29/2020 40* 55 - 135 U/L Final    AST 10/29/2020 17  10 - 40 U/L Final    ALT 10/29/2020 9* 10 - 44 U/L Final    Anion Gap 10/29/2020 8  8 - 16 mmol/L Final    eGFR if African American 10/29/2020 >60.0  >60 mL/min/1.73 m^2 Final    eGFR if non African American 10/29/2020 >60.0  >60 mL/min/1.73 m^2 Final    Comment: Calculation used to obtain the estimated glomerular filtration  rate (eGFR) is the CKD-EPI equation.       Cholesterol 10/29/2020 155  120 - 199 mg/dL Final    Comment: The National Cholesterol Education Program (NCEP) has set the  following guidelines (reference ranges) for Cholesterol:  Optimal.....................<200 mg/dL  Borderline High.............200-239 mg/dL  High........................> or = 240 mg/dL       Triglycerides 10/29/2020 70  30 - 150 mg/dL Final    Comment: The National Cholesterol Education Program (NCEP) has set the  following guidelines (reference values) for triglycerides:  Normal......................<150 mg/dL  Borderline High.............150-199 mg/dL  High........................200-499 mg/dL      HDL 10/29/2020 58  40 - 75 mg/dL Final    Comment: The National Cholesterol Education Program (NCEP) has set the  following guidelines (reference values) for HDL Cholesterol:  Low...............<40 mg/dL  Optimal...........>60 mg/dL      LDL Cholesterol 10/29/2020 83.0  63.0 - 159.0 mg/dL Final    Comment: The National Cholesterol Education Program (NCEP) has set the  following guidelines (reference values) for LDL Cholesterol:  Optimal.......................<130 mg/dL  Borderline High...............130-159 mg/dL  High..........................160-189 mg/dL  Very High.....................>190 mg/dL      HDL/Cholesterol Ratio 10/29/2020 37.4  20.0 - 50.0 % Final    Total Cholesterol/HDL Ratio 10/29/2020 2.7  2.0 - 5.0 Final    Non-HDL Cholesterol 10/29/2020 97  mg/dL Final    Comment: Risk category and Non-HDL cholesterol goals:  Coronary heart disease (CHD)or equivalent (10-year risk of CHD >20%):  Non-HDL cholesterol goal     <130 mg/dL  Two or more CHD risk factors and 10-year risk of CHD <= 20%:  Non-HDL cholesterol goal     <160 mg/dL  0 to 1 CHD risk factor:  Non-HDL cholesterol goal     <190 mg/dL      Vit D, 25-Hydroxy 10/29/2020 52  30 - 96 ng/mL Final    Comment: Vitamin D deficiency.........<10 ng/mL                              Vitamin D insufficiency......10-29 ng/mL       Vitamin D sufficiency........> or equal to 30 ng/mL  Vitamin D toxicity............>100 ng/mL      TSH 10/29/2020 2.003  0.400 - 4.000 uIU/mL Final    WBC 10/29/2020 5.21  3.90 - 12.70 K/uL Final    RBC 10/29/2020 3.98* 4.00 - 5.40 M/uL Final    Hemoglobin 10/29/2020 12.5  12.0 - 16.0 g/dL Final    Hematocrit  10/29/2020 40.6  37.0 - 48.5 % Final    MCV 10/29/2020 102* 82 - 98 fL Final    MCH 10/29/2020 31.4* 27.0 - 31.0 pg Final    MCHC 10/29/2020 30.8* 32.0 - 36.0 g/dL Final    RDW 10/29/2020 12.0  11.5 - 14.5 % Final    Platelets 10/29/2020 252  150 - 350 K/uL Final    MPV 10/29/2020 11.1  9.2 - 12.9 fL Final    Immature Granulocytes 10/29/2020 0.2  0.0 - 0.5 % Final    Gran # (ANC) 10/29/2020 3.1  1.8 - 7.7 K/uL Final    Immature Grans (Abs) 10/29/2020 0.01  0.00 - 0.04 K/uL Final    Comment: Mild elevation in immature granulocytes is non specific and   can be seen in a variety of conditions including stress response,   acute inflammation, trauma and pregnancy. Correlation with other   laboratory and clinical findings is essential.      Lymph # 10/29/2020 1.3  1.0 - 4.8 K/uL Final    Mono # 10/29/2020 0.7  0.3 - 1.0 K/uL Final    Eos # 10/29/2020 0.2  0.0 - 0.5 K/uL Final    Baso # 10/29/2020 0.04  0.00 - 0.20 K/uL Final    nRBC 10/29/2020 0  0 /100 WBC Final    Gran % 10/29/2020 59.0  38.0 - 73.0 % Final    Lymph % 10/29/2020 24.4  18.0 - 48.0 % Final    Mono % 10/29/2020 12.7  4.0 - 15.0 % Final    Eosinophil % 10/29/2020 2.9  0.0 - 8.0 % Final    Basophil % 10/29/2020 0.8  0.0 - 1.9 % Final    Differential Method 10/29/2020 Automated   Final         Assessment:     1. Essential hypertension    2. Anxiety    3. Depression, unspecified depression type    4. Mixed hyperlipidemia    5. PAF (paroxysmal atrial fibrillation)    6. Osteopenia, unspecified location    7. Hyperlipidemia, unspecified hyperlipidemia type    8. HSV-2 infection      Plan:   Essential hypertension    Controlled.  Continue current medications  Anxiety   stable.  Continue current medications  -     escitalopram oxalate (LEXAPRO) 20 MG tablet; Take 1 tablet (20 mg total) by mouth once daily.  Dispense: 90 tablet; Refill: 2    Depression, unspecified depression type   controlled.  Continue current medications  -     escitalopram  oxalate (LEXAPRO) 20 MG tablet; Take 1 tablet (20 mg total) by mouth once daily.  Dispense: 90 tablet; Refill: 2    Mixed hyperlipidemia   controlled.  Continue current medications    PAF (paroxysmal atrial fibrillation)   rate controlled.  Continue current medications and follow-up with cardiology  Osteopenia, unspecified location  -     alendronate (FOSAMAX) 70 MG tablet; Take 1 tablet (70 mg total) by mouth every 7 days.  Dispense: 12 tablet; Refill: 3    Hyperlipidemia, unspecified hyperlipidemia type   stable.  Continue current medications  -     atorvastatin (LIPITOR) 10 MG tablet; Take 1 tablet (10 mg total) by mouth once daily.  Dispense: 90 tablet; Refill: 2    HSV-2 infection  -     valACYclovir (VALTREX) 500 MG tablet; Take 1 tablet (500 mg total) by mouth once daily.  Dispense: 90 tablet; Refill: 2            Problem List Items Addressed This Visit        Psychiatric    Depression    Relevant Medications    escitalopram oxalate (LEXAPRO) 20 MG tablet    Anxiety    Relevant Medications    escitalopram oxalate (LEXAPRO) 20 MG tablet       Cardiac/Vascular    Hypertension - Primary    Hyperlipidemia    Relevant Medications    atorvastatin (LIPITOR) 10 MG tablet    PAF (paroxysmal atrial fibrillation)       ID    HSV-2 infection    Relevant Medications    valACYclovir (VALTREX) 500 MG tablet       Orthopedic    Osteopenia    Relevant Medications    alendronate (FOSAMAX) 70 MG tablet          Follow up in about 6 months (around 4/30/2021), or if symptoms worsen or fail to improve.

## 2020-10-31 ENCOUNTER — EXTERNAL CHRONIC CARE MANAGEMENT (OUTPATIENT)
Dept: PRIMARY CARE CLINIC | Facility: CLINIC | Age: 80
End: 2020-10-31
Payer: MEDICARE

## 2020-10-31 PROCEDURE — 99490 CHRNC CARE MGMT STAFF 1ST 20: CPT | Mod: S$PBB,,, | Performed by: INTERNAL MEDICINE

## 2020-10-31 PROCEDURE — 99490 PR CHRONIC CARE MGMT, 1ST 20 MIN: ICD-10-PCS | Mod: S$PBB,,, | Performed by: INTERNAL MEDICINE

## 2020-10-31 PROCEDURE — 99490 CHRNC CARE MGMT STAFF 1ST 20: CPT | Mod: PBBFAC | Performed by: INTERNAL MEDICINE

## 2020-11-12 DIAGNOSIS — I10 ESSENTIAL HYPERTENSION: Primary | ICD-10-CM

## 2020-11-12 NOTE — TELEPHONE ENCOUNTER
----- Message from Khushboo Pendleton sent at 11/12/2020  8:14 AM CST -----  Regarding: refill  Type:  RX Refill Request    Who Called: pt  Refill or New Rx:refill  RX Name and Strength:metoprolol succinate (TOPROL-XL) 25 MG 24 hr tablet  How is the patient currently taking it? (ex. 1XDay):once a day  Is this a 30 day or 90 day RX: 90  Preferred Pharmacy with phone number:  [facility]/pharmacy #[id number] - [place] - [address]  [address]  [facility] [address]  Phone: [phone] Fax: [phone]  Local or Mail Order:local  Ordering Provider:Hammad  Would the patient rather a call back or a response via [facility]? Call back   Best Call Back Number:[phone]  Additional Information: Please call back.Thanks

## 2020-11-12 NOTE — TELEPHONE ENCOUNTER
----- Message from Lorena Correa sent at 11/12/2020  3:25 PM CST -----  Regarding: pt  Pt would like a call from nurse in regards to her refill request for Metoprolol . Please call back at .[phone] (home)     Thank You ,   Lorena Correa

## 2020-11-13 RX ORDER — METOPROLOL SUCCINATE 25 MG/1
25 TABLET, EXTENDED RELEASE ORAL DAILY
Qty: 90 TABLET | Refills: 3 | Status: SHIPPED | OUTPATIENT
Start: 2020-11-13 | End: 2021-09-15

## 2020-11-30 ENCOUNTER — EXTERNAL CHRONIC CARE MANAGEMENT (OUTPATIENT)
Dept: PRIMARY CARE CLINIC | Facility: CLINIC | Age: 80
End: 2020-11-30
Payer: MEDICARE

## 2020-11-30 ENCOUNTER — PATIENT MESSAGE (OUTPATIENT)
Dept: CARDIOLOGY | Facility: CLINIC | Age: 80
End: 2020-11-30

## 2020-11-30 DIAGNOSIS — F32.A DEPRESSION, UNSPECIFIED DEPRESSION TYPE: ICD-10-CM

## 2020-11-30 DIAGNOSIS — F41.9 ANXIETY: ICD-10-CM

## 2020-11-30 PROCEDURE — 99490 CHRNC CARE MGMT STAFF 1ST 20: CPT | Mod: S$PBB,,, | Performed by: INTERNAL MEDICINE

## 2020-11-30 PROCEDURE — 99490 PR CHRONIC CARE MGMT, 1ST 20 MIN: ICD-10-PCS | Mod: S$PBB,,, | Performed by: INTERNAL MEDICINE

## 2020-11-30 PROCEDURE — 99490 CHRNC CARE MGMT STAFF 1ST 20: CPT | Mod: PBBFAC | Performed by: INTERNAL MEDICINE

## 2020-11-30 RX ORDER — ESCITALOPRAM OXALATE 20 MG/1
20 TABLET ORAL DAILY
Qty: 90 TABLET | Refills: 0 | Status: SHIPPED | OUTPATIENT
Start: 2020-11-30 | End: 2021-07-13 | Stop reason: SDUPTHER

## 2020-11-30 RX ORDER — RAMIPRIL 2.5 MG/1
2.5 CAPSULE ORAL DAILY
Qty: 90 CAPSULE | Refills: 3 | Status: SHIPPED | OUTPATIENT
Start: 2020-11-30 | End: 2021-10-14 | Stop reason: SDUPTHER

## 2020-12-11 ENCOUNTER — PATIENT MESSAGE (OUTPATIENT)
Dept: OTHER | Facility: OTHER | Age: 80
End: 2020-12-11

## 2020-12-31 ENCOUNTER — EXTERNAL CHRONIC CARE MANAGEMENT (OUTPATIENT)
Dept: PRIMARY CARE CLINIC | Facility: CLINIC | Age: 80
End: 2020-12-31
Payer: MEDICARE

## 2020-12-31 PROCEDURE — 99490 CHRNC CARE MGMT STAFF 1ST 20: CPT | Mod: PBBFAC | Performed by: INTERNAL MEDICINE

## 2020-12-31 PROCEDURE — 99490 CHRNC CARE MGMT STAFF 1ST 20: CPT | Mod: S$PBB,,, | Performed by: INTERNAL MEDICINE

## 2020-12-31 PROCEDURE — 99490 PR CHRONIC CARE MGMT, 1ST 20 MIN: ICD-10-PCS | Mod: S$PBB,,, | Performed by: INTERNAL MEDICINE

## 2021-01-14 ENCOUNTER — IMMUNIZATION (OUTPATIENT)
Dept: INTERNAL MEDICINE | Facility: CLINIC | Age: 81
End: 2021-01-14
Payer: COMMERCIAL

## 2021-01-14 DIAGNOSIS — Z23 NEED FOR VACCINATION: ICD-10-CM

## 2021-01-14 PROCEDURE — 91300 COVID-19, MRNA, LNP-S, PF, 30 MCG/0.3 ML DOSE VACCINE: CPT | Mod: PBBFAC | Performed by: FAMILY MEDICINE

## 2021-01-26 ENCOUNTER — TELEPHONE (OUTPATIENT)
Dept: INTERNAL MEDICINE | Facility: CLINIC | Age: 81
End: 2021-01-26

## 2021-01-31 ENCOUNTER — EXTERNAL CHRONIC CARE MANAGEMENT (OUTPATIENT)
Dept: PRIMARY CARE CLINIC | Facility: CLINIC | Age: 81
End: 2021-01-31
Payer: MEDICARE

## 2021-01-31 PROCEDURE — 99490 CHRNC CARE MGMT STAFF 1ST 20: CPT | Mod: PBBFAC | Performed by: INTERNAL MEDICINE

## 2021-01-31 PROCEDURE — 99490 PR CHRONIC CARE MGMT, 1ST 20 MIN: ICD-10-PCS | Mod: S$PBB,,, | Performed by: INTERNAL MEDICINE

## 2021-01-31 PROCEDURE — 99490 CHRNC CARE MGMT STAFF 1ST 20: CPT | Mod: S$PBB,,, | Performed by: INTERNAL MEDICINE

## 2021-02-04 ENCOUNTER — IMMUNIZATION (OUTPATIENT)
Dept: INTERNAL MEDICINE | Facility: CLINIC | Age: 81
End: 2021-02-04
Payer: COMMERCIAL

## 2021-02-04 DIAGNOSIS — Z23 NEED FOR VACCINATION: Primary | ICD-10-CM

## 2021-02-04 PROCEDURE — 0002A COVID-19, MRNA, LNP-S, PF, 30 MCG/0.3 ML DOSE VACCINE: CPT | Mod: PBBFAC | Performed by: FAMILY MEDICINE

## 2021-02-04 PROCEDURE — 91300 COVID-19, MRNA, LNP-S, PF, 30 MCG/0.3 ML DOSE VACCINE: CPT | Mod: PBBFAC | Performed by: FAMILY MEDICINE

## 2021-02-28 ENCOUNTER — EXTERNAL CHRONIC CARE MANAGEMENT (OUTPATIENT)
Dept: PRIMARY CARE CLINIC | Facility: CLINIC | Age: 81
End: 2021-02-28
Payer: MEDICARE

## 2021-02-28 PROCEDURE — 99490 CHRNC CARE MGMT STAFF 1ST 20: CPT | Mod: PBBFAC | Performed by: INTERNAL MEDICINE

## 2021-02-28 PROCEDURE — 99490 PR CHRONIC CARE MGMT, 1ST 20 MIN: ICD-10-PCS | Mod: S$PBB,,, | Performed by: INTERNAL MEDICINE

## 2021-02-28 PROCEDURE — 99490 CHRNC CARE MGMT STAFF 1ST 20: CPT | Mod: S$PBB,,, | Performed by: INTERNAL MEDICINE

## 2021-03-19 ENCOUNTER — OFFICE VISIT (OUTPATIENT)
Dept: CARDIOLOGY | Facility: CLINIC | Age: 81
End: 2021-03-19
Payer: MEDICARE

## 2021-03-19 VITALS
OXYGEN SATURATION: 98 % | BODY MASS INDEX: 22.45 KG/M2 | HEART RATE: 76 BPM | DIASTOLIC BLOOD PRESSURE: 64 MMHG | SYSTOLIC BLOOD PRESSURE: 136 MMHG | WEIGHT: 134.94 LBS

## 2021-03-19 DIAGNOSIS — I48.0 PAF (PAROXYSMAL ATRIAL FIBRILLATION): Primary | ICD-10-CM

## 2021-03-19 DIAGNOSIS — E78.2 MIXED HYPERLIPIDEMIA: ICD-10-CM

## 2021-03-19 DIAGNOSIS — I10 ESSENTIAL HYPERTENSION: ICD-10-CM

## 2021-03-19 DIAGNOSIS — I35.1 NONRHEUMATIC AORTIC VALVE INSUFFICIENCY: ICD-10-CM

## 2021-03-19 PROCEDURE — 99213 OFFICE O/P EST LOW 20 MIN: CPT | Mod: PBBFAC | Performed by: INTERNAL MEDICINE

## 2021-03-19 PROCEDURE — 99999 PR PBB SHADOW E&M-EST. PATIENT-LVL III: CPT | Mod: PBBFAC,,, | Performed by: INTERNAL MEDICINE

## 2021-03-19 PROCEDURE — 99999 PR PBB SHADOW E&M-EST. PATIENT-LVL III: ICD-10-PCS | Mod: PBBFAC,,, | Performed by: INTERNAL MEDICINE

## 2021-03-19 PROCEDURE — 99214 PR OFFICE/OUTPT VISIT, EST, LEVL IV, 30-39 MIN: ICD-10-PCS | Mod: S$PBB,,, | Performed by: INTERNAL MEDICINE

## 2021-03-19 PROCEDURE — 99214 OFFICE O/P EST MOD 30 MIN: CPT | Mod: S$PBB,,, | Performed by: INTERNAL MEDICINE

## 2021-03-31 ENCOUNTER — EXTERNAL CHRONIC CARE MANAGEMENT (OUTPATIENT)
Dept: PRIMARY CARE CLINIC | Facility: CLINIC | Age: 81
End: 2021-03-31
Payer: MEDICARE

## 2021-03-31 PROCEDURE — 99490 CHRNC CARE MGMT STAFF 1ST 20: CPT | Mod: S$PBB,,, | Performed by: INTERNAL MEDICINE

## 2021-03-31 PROCEDURE — 99490 CHRNC CARE MGMT STAFF 1ST 20: CPT | Mod: PBBFAC | Performed by: INTERNAL MEDICINE

## 2021-03-31 PROCEDURE — 99490 PR CHRONIC CARE MGMT, 1ST 20 MIN: ICD-10-PCS | Mod: S$PBB,,, | Performed by: INTERNAL MEDICINE

## 2021-04-30 ENCOUNTER — EXTERNAL CHRONIC CARE MANAGEMENT (OUTPATIENT)
Dept: PRIMARY CARE CLINIC | Facility: CLINIC | Age: 81
End: 2021-04-30
Payer: MEDICARE

## 2021-04-30 PROCEDURE — 99490 CHRNC CARE MGMT STAFF 1ST 20: CPT | Mod: S$PBB,,, | Performed by: INTERNAL MEDICINE

## 2021-04-30 PROCEDURE — 99490 PR CHRONIC CARE MGMT, 1ST 20 MIN: ICD-10-PCS | Mod: S$PBB,,, | Performed by: INTERNAL MEDICINE

## 2021-04-30 PROCEDURE — 99490 CHRNC CARE MGMT STAFF 1ST 20: CPT | Mod: PBBFAC | Performed by: INTERNAL MEDICINE

## 2021-05-31 ENCOUNTER — EXTERNAL CHRONIC CARE MANAGEMENT (OUTPATIENT)
Dept: PRIMARY CARE CLINIC | Facility: CLINIC | Age: 81
End: 2021-05-31
Payer: MEDICARE

## 2021-05-31 PROCEDURE — 99490 PR CHRONIC CARE MGMT, 1ST 20 MIN: ICD-10-PCS | Mod: S$PBB,,, | Performed by: INTERNAL MEDICINE

## 2021-05-31 PROCEDURE — 99490 CHRNC CARE MGMT STAFF 1ST 20: CPT | Mod: PBBFAC | Performed by: INTERNAL MEDICINE

## 2021-05-31 PROCEDURE — 99490 CHRNC CARE MGMT STAFF 1ST 20: CPT | Mod: S$PBB,,, | Performed by: INTERNAL MEDICINE

## 2021-06-10 ENCOUNTER — TELEPHONE (OUTPATIENT)
Dept: INTERNAL MEDICINE | Facility: CLINIC | Age: 81
End: 2021-06-10

## 2021-06-30 ENCOUNTER — EXTERNAL CHRONIC CARE MANAGEMENT (OUTPATIENT)
Dept: PRIMARY CARE CLINIC | Facility: CLINIC | Age: 81
End: 2021-06-30
Payer: MEDICARE

## 2021-06-30 PROCEDURE — 99490 CHRNC CARE MGMT STAFF 1ST 20: CPT | Mod: PBBFAC | Performed by: INTERNAL MEDICINE

## 2021-06-30 PROCEDURE — 99490 CHRNC CARE MGMT STAFF 1ST 20: CPT | Mod: S$PBB,,, | Performed by: INTERNAL MEDICINE

## 2021-06-30 PROCEDURE — 99490 PR CHRONIC CARE MGMT, 1ST 20 MIN: ICD-10-PCS | Mod: S$PBB,,, | Performed by: INTERNAL MEDICINE

## 2021-07-13 ENCOUNTER — OFFICE VISIT (OUTPATIENT)
Dept: INTERNAL MEDICINE | Facility: CLINIC | Age: 81
End: 2021-07-13
Payer: MEDICARE

## 2021-07-13 VITALS
SYSTOLIC BLOOD PRESSURE: 114 MMHG | RESPIRATION RATE: 18 BRPM | TEMPERATURE: 98 F | WEIGHT: 133.19 LBS | DIASTOLIC BLOOD PRESSURE: 70 MMHG | BODY MASS INDEX: 22.19 KG/M2 | HEIGHT: 65 IN | OXYGEN SATURATION: 97 % | HEART RATE: 83 BPM

## 2021-07-13 DIAGNOSIS — E78.5 HYPERLIPIDEMIA, UNSPECIFIED HYPERLIPIDEMIA TYPE: ICD-10-CM

## 2021-07-13 DIAGNOSIS — I10 ESSENTIAL HYPERTENSION: Primary | ICD-10-CM

## 2021-07-13 DIAGNOSIS — L84 CALLUS OF FOOT: ICD-10-CM

## 2021-07-13 DIAGNOSIS — M85.80 OSTEOPENIA, UNSPECIFIED LOCATION: ICD-10-CM

## 2021-07-13 DIAGNOSIS — I48.0 PAF (PAROXYSMAL ATRIAL FIBRILLATION): ICD-10-CM

## 2021-07-13 DIAGNOSIS — I35.1 NONRHEUMATIC AORTIC VALVE INSUFFICIENCY: ICD-10-CM

## 2021-07-13 DIAGNOSIS — B00.9 HSV-2 INFECTION: ICD-10-CM

## 2021-07-13 DIAGNOSIS — F32.A DEPRESSION, UNSPECIFIED DEPRESSION TYPE: ICD-10-CM

## 2021-07-13 DIAGNOSIS — F41.9 ANXIETY: ICD-10-CM

## 2021-07-13 DIAGNOSIS — H61.23 BILATERAL IMPACTED CERUMEN: ICD-10-CM

## 2021-07-13 DIAGNOSIS — E78.2 MIXED HYPERLIPIDEMIA: ICD-10-CM

## 2021-07-13 DIAGNOSIS — Z11.59 NEED FOR HEPATITIS C SCREENING TEST: ICD-10-CM

## 2021-07-13 PROCEDURE — 99214 OFFICE O/P EST MOD 30 MIN: CPT | Mod: S$PBB,,, | Performed by: INTERNAL MEDICINE

## 2021-07-13 PROCEDURE — 99214 PR OFFICE/OUTPT VISIT, EST, LEVL IV, 30-39 MIN: ICD-10-PCS | Mod: S$PBB,,, | Performed by: INTERNAL MEDICINE

## 2021-07-13 PROCEDURE — 99999 PR PBB SHADOW E&M-EST. PATIENT-LVL III: ICD-10-PCS | Mod: PBBFAC,,, | Performed by: INTERNAL MEDICINE

## 2021-07-13 PROCEDURE — 99213 OFFICE O/P EST LOW 20 MIN: CPT | Mod: PBBFAC | Performed by: INTERNAL MEDICINE

## 2021-07-13 PROCEDURE — 99999 PR PBB SHADOW E&M-EST. PATIENT-LVL III: CPT | Mod: PBBFAC,,, | Performed by: INTERNAL MEDICINE

## 2021-07-13 RX ORDER — ATORVASTATIN CALCIUM 10 MG/1
10 TABLET, FILM COATED ORAL DAILY
Qty: 90 TABLET | Refills: 2 | Status: SHIPPED | OUTPATIENT
Start: 2021-07-13 | End: 2021-10-14 | Stop reason: SDUPTHER

## 2021-07-13 RX ORDER — ESCITALOPRAM OXALATE 20 MG/1
20 TABLET ORAL DAILY
Qty: 90 TABLET | Refills: 3 | Status: SHIPPED | OUTPATIENT
Start: 2021-07-13 | End: 2021-10-14 | Stop reason: SDUPTHER

## 2021-07-13 RX ORDER — ALENDRONATE SODIUM 70 MG/1
70 TABLET ORAL WEEKLY
Qty: 12 TABLET | Refills: 1 | Status: SHIPPED | OUTPATIENT
Start: 2021-07-13 | End: 2022-01-05 | Stop reason: SDUPTHER

## 2021-07-14 ENCOUNTER — OFFICE VISIT (OUTPATIENT)
Dept: OTOLARYNGOLOGY | Facility: CLINIC | Age: 81
End: 2021-07-14
Payer: MEDICARE

## 2021-07-14 VITALS — HEIGHT: 65 IN | TEMPERATURE: 98 F | BODY MASS INDEX: 22.51 KG/M2 | WEIGHT: 135.13 LBS

## 2021-07-14 DIAGNOSIS — H61.23 BILATERAL IMPACTED CERUMEN: ICD-10-CM

## 2021-07-14 PROCEDURE — 99999 PR PBB SHADOW E&M-EST. PATIENT-LVL III: ICD-10-PCS | Mod: PBBFAC,,, | Performed by: OTOLARYNGOLOGY

## 2021-07-14 PROCEDURE — 69210 REMOVE IMPACTED EAR WAX UNI: CPT | Mod: S$PBB,,, | Performed by: OTOLARYNGOLOGY

## 2021-07-14 PROCEDURE — 99202 PR OFFICE/OUTPT VISIT, NEW, LEVL II, 15-29 MIN: ICD-10-PCS | Mod: 25,S$PBB,, | Performed by: OTOLARYNGOLOGY

## 2021-07-14 PROCEDURE — 69210 PR REMOVAL IMPACTED CERUMEN REQUIRING INSTRUMENTATION, UNILATERAL: ICD-10-PCS | Mod: S$PBB,,, | Performed by: OTOLARYNGOLOGY

## 2021-07-14 PROCEDURE — 99202 OFFICE O/P NEW SF 15 MIN: CPT | Mod: 25,S$PBB,, | Performed by: OTOLARYNGOLOGY

## 2021-07-14 PROCEDURE — 99213 OFFICE O/P EST LOW 20 MIN: CPT | Mod: PBBFAC | Performed by: OTOLARYNGOLOGY

## 2021-07-14 PROCEDURE — 99999 PR PBB SHADOW E&M-EST. PATIENT-LVL III: CPT | Mod: PBBFAC,,, | Performed by: OTOLARYNGOLOGY

## 2021-07-14 PROCEDURE — 69210 REMOVE IMPACTED EAR WAX UNI: CPT | Mod: 50,PBBFAC | Performed by: OTOLARYNGOLOGY

## 2021-07-31 ENCOUNTER — EXTERNAL CHRONIC CARE MANAGEMENT (OUTPATIENT)
Dept: PRIMARY CARE CLINIC | Facility: CLINIC | Age: 81
End: 2021-07-31
Payer: MEDICARE

## 2021-07-31 PROCEDURE — 99490 CHRNC CARE MGMT STAFF 1ST 20: CPT | Mod: PBBFAC | Performed by: INTERNAL MEDICINE

## 2021-07-31 PROCEDURE — 99490 CHRNC CARE MGMT STAFF 1ST 20: CPT | Mod: S$PBB,,, | Performed by: INTERNAL MEDICINE

## 2021-07-31 PROCEDURE — 99490 PR CHRONIC CARE MGMT, 1ST 20 MIN: ICD-10-PCS | Mod: S$PBB,,, | Performed by: INTERNAL MEDICINE

## 2021-08-05 ENCOUNTER — OFFICE VISIT (OUTPATIENT)
Dept: PODIATRY | Facility: CLINIC | Age: 81
End: 2021-08-05
Payer: MEDICARE

## 2021-08-05 VITALS — BODY MASS INDEX: 22.51 KG/M2 | WEIGHT: 135.13 LBS | HEIGHT: 65 IN

## 2021-08-05 DIAGNOSIS — L84 CALLUS OF FOOT: ICD-10-CM

## 2021-08-05 DIAGNOSIS — M79.672 LEFT FOOT PAIN: Primary | ICD-10-CM

## 2021-08-05 DIAGNOSIS — M21.6X2 PLANTAR FAT PAD ATROPHY OF LEFT FOOT: ICD-10-CM

## 2021-08-05 PROCEDURE — 99999 PR PBB SHADOW E&M-EST. PATIENT-LVL III: CPT | Mod: PBBFAC,,, | Performed by: PODIATRIST

## 2021-08-05 PROCEDURE — 99203 OFFICE O/P NEW LOW 30 MIN: CPT | Mod: S$PBB,,, | Performed by: PODIATRIST

## 2021-08-05 PROCEDURE — 99203 PR OFFICE/OUTPT VISIT, NEW, LEVL III, 30-44 MIN: ICD-10-PCS | Mod: S$PBB,,, | Performed by: PODIATRIST

## 2021-08-05 PROCEDURE — 99213 OFFICE O/P EST LOW 20 MIN: CPT | Mod: PBBFAC | Performed by: PODIATRIST

## 2021-08-05 PROCEDURE — 99999 PR PBB SHADOW E&M-EST. PATIENT-LVL III: ICD-10-PCS | Mod: PBBFAC,,, | Performed by: PODIATRIST

## 2021-08-05 RX ORDER — AMMONIUM LACTATE 12 G/100G
1 CREAM TOPICAL 2 TIMES DAILY
Qty: 1 TUBE | Refills: 3 | Status: SHIPPED | OUTPATIENT
Start: 2021-08-05 | End: 2023-04-17

## 2021-08-31 ENCOUNTER — EXTERNAL CHRONIC CARE MANAGEMENT (OUTPATIENT)
Dept: PRIMARY CARE CLINIC | Facility: CLINIC | Age: 81
End: 2021-08-31
Payer: MEDICARE

## 2021-08-31 PROCEDURE — 99490 CHRNC CARE MGMT STAFF 1ST 20: CPT | Mod: PBBFAC | Performed by: INTERNAL MEDICINE

## 2021-08-31 PROCEDURE — 99490 PR CHRONIC CARE MGMT, 1ST 20 MIN: ICD-10-PCS | Mod: S$PBB,,, | Performed by: INTERNAL MEDICINE

## 2021-08-31 PROCEDURE — 99490 CHRNC CARE MGMT STAFF 1ST 20: CPT | Mod: S$PBB,,, | Performed by: INTERNAL MEDICINE

## 2021-09-02 DIAGNOSIS — I48.0 PAF (PAROXYSMAL ATRIAL FIBRILLATION): Primary | ICD-10-CM

## 2021-09-15 ENCOUNTER — PATIENT OUTREACH (OUTPATIENT)
Dept: ADMINISTRATIVE | Facility: OTHER | Age: 81
End: 2021-09-15

## 2021-09-16 ENCOUNTER — OFFICE VISIT (OUTPATIENT)
Dept: CARDIOLOGY | Facility: CLINIC | Age: 81
End: 2021-09-16
Payer: MEDICARE

## 2021-09-16 VITALS
DIASTOLIC BLOOD PRESSURE: 72 MMHG | SYSTOLIC BLOOD PRESSURE: 124 MMHG | WEIGHT: 135.13 LBS | OXYGEN SATURATION: 97 % | BODY MASS INDEX: 22.49 KG/M2 | HEART RATE: 55 BPM

## 2021-09-16 DIAGNOSIS — I10 ESSENTIAL HYPERTENSION: ICD-10-CM

## 2021-09-16 DIAGNOSIS — E78.2 MIXED HYPERLIPIDEMIA: ICD-10-CM

## 2021-09-16 DIAGNOSIS — I35.1 NONRHEUMATIC AORTIC VALVE INSUFFICIENCY: ICD-10-CM

## 2021-09-16 DIAGNOSIS — I48.0 PAF (PAROXYSMAL ATRIAL FIBRILLATION): Primary | ICD-10-CM

## 2021-09-16 PROCEDURE — 99213 OFFICE O/P EST LOW 20 MIN: CPT | Mod: PBBFAC | Performed by: INTERNAL MEDICINE

## 2021-09-16 PROCEDURE — 99999 PR PBB SHADOW E&M-EST. PATIENT-LVL III: CPT | Mod: PBBFAC,,, | Performed by: INTERNAL MEDICINE

## 2021-09-16 PROCEDURE — 99214 OFFICE O/P EST MOD 30 MIN: CPT | Mod: S$PBB,,, | Performed by: INTERNAL MEDICINE

## 2021-09-16 PROCEDURE — 99999 PR PBB SHADOW E&M-EST. PATIENT-LVL III: ICD-10-PCS | Mod: PBBFAC,,, | Performed by: INTERNAL MEDICINE

## 2021-09-16 PROCEDURE — 99214 PR OFFICE/OUTPT VISIT, EST, LEVL IV, 30-39 MIN: ICD-10-PCS | Mod: S$PBB,,, | Performed by: INTERNAL MEDICINE

## 2021-09-20 ENCOUNTER — TELEPHONE (OUTPATIENT)
Dept: CARDIOLOGY | Facility: CLINIC | Age: 81
End: 2021-09-20

## 2021-09-30 ENCOUNTER — EXTERNAL CHRONIC CARE MANAGEMENT (OUTPATIENT)
Dept: PRIMARY CARE CLINIC | Facility: CLINIC | Age: 81
End: 2021-09-30
Payer: MEDICARE

## 2021-09-30 PROCEDURE — 99490 CHRNC CARE MGMT STAFF 1ST 20: CPT | Mod: PBBFAC | Performed by: INTERNAL MEDICINE

## 2021-09-30 PROCEDURE — 99490 CHRNC CARE MGMT STAFF 1ST 20: CPT | Mod: S$PBB,,, | Performed by: INTERNAL MEDICINE

## 2021-09-30 PROCEDURE — 99490 PR CHRONIC CARE MGMT, 1ST 20 MIN: ICD-10-PCS | Mod: S$PBB,,, | Performed by: INTERNAL MEDICINE

## 2021-10-06 ENCOUNTER — LAB VISIT (OUTPATIENT)
Dept: LAB | Facility: HOSPITAL | Age: 81
End: 2021-10-06
Attending: INTERNAL MEDICINE
Payer: MEDICARE

## 2021-10-06 DIAGNOSIS — E78.2 MIXED HYPERLIPIDEMIA: ICD-10-CM

## 2021-10-06 DIAGNOSIS — I10 ESSENTIAL HYPERTENSION: ICD-10-CM

## 2021-10-06 DIAGNOSIS — Z11.59 NEED FOR HEPATITIS C SCREENING TEST: ICD-10-CM

## 2021-10-06 LAB
ALBUMIN SERPL BCP-MCNC: 4.1 G/DL (ref 3.5–5.2)
ALP SERPL-CCNC: 36 U/L (ref 55–135)
ALT SERPL W/O P-5'-P-CCNC: 15 U/L (ref 10–44)
ANION GAP SERPL CALC-SCNC: 10 MMOL/L (ref 8–16)
AST SERPL-CCNC: 20 U/L (ref 10–40)
BASOPHILS # BLD AUTO: 0.04 K/UL (ref 0–0.2)
BASOPHILS NFR BLD: 0.7 % (ref 0–1.9)
BILIRUB SERPL-MCNC: 0.6 MG/DL (ref 0.1–1)
BUN SERPL-MCNC: 24 MG/DL (ref 8–23)
CALCIUM SERPL-MCNC: 9.5 MG/DL (ref 8.7–10.5)
CHLORIDE SERPL-SCNC: 106 MMOL/L (ref 95–110)
CHOLEST SERPL-MCNC: 151 MG/DL (ref 120–199)
CHOLEST/HDLC SERPL: 2.3 {RATIO} (ref 2–5)
CO2 SERPL-SCNC: 24 MMOL/L (ref 23–29)
CREAT SERPL-MCNC: 0.9 MG/DL (ref 0.5–1.4)
DIFFERENTIAL METHOD: ABNORMAL
EOSINOPHIL # BLD AUTO: 0.1 K/UL (ref 0–0.5)
EOSINOPHIL NFR BLD: 2.3 % (ref 0–8)
ERYTHROCYTE [DISTWIDTH] IN BLOOD BY AUTOMATED COUNT: 12.4 % (ref 11.5–14.5)
EST. GFR  (AFRICAN AMERICAN): >60 ML/MIN/1.73 M^2
EST. GFR  (NON AFRICAN AMERICAN): >60 ML/MIN/1.73 M^2
GLUCOSE SERPL-MCNC: 82 MG/DL (ref 70–110)
HCT VFR BLD AUTO: 37.7 % (ref 37–48.5)
HDLC SERPL-MCNC: 67 MG/DL (ref 40–75)
HDLC SERPL: 44.4 % (ref 20–50)
HGB BLD-MCNC: 12.6 G/DL (ref 12–16)
IMM GRANULOCYTES # BLD AUTO: 0.02 K/UL (ref 0–0.04)
IMM GRANULOCYTES NFR BLD AUTO: 0.3 % (ref 0–0.5)
LDLC SERPL CALC-MCNC: 72.8 MG/DL (ref 63–159)
LYMPHOCYTES # BLD AUTO: 1.2 K/UL (ref 1–4.8)
LYMPHOCYTES NFR BLD: 20.6 % (ref 18–48)
MCH RBC QN AUTO: 32.8 PG (ref 27–31)
MCHC RBC AUTO-ENTMCNC: 33.4 G/DL (ref 32–36)
MCV RBC AUTO: 98 FL (ref 82–98)
MONOCYTES # BLD AUTO: 0.7 K/UL (ref 0.3–1)
MONOCYTES NFR BLD: 11.4 % (ref 4–15)
NEUTROPHILS # BLD AUTO: 3.7 K/UL (ref 1.8–7.7)
NEUTROPHILS NFR BLD: 64.7 % (ref 38–73)
NONHDLC SERPL-MCNC: 84 MG/DL
NRBC BLD-RTO: 0 /100 WBC
PLATELET # BLD AUTO: 229 K/UL (ref 150–450)
PMV BLD AUTO: 11.4 FL (ref 9.2–12.9)
POTASSIUM SERPL-SCNC: 4.5 MMOL/L (ref 3.5–5.1)
PROT SERPL-MCNC: 6.4 G/DL (ref 6–8.4)
RBC # BLD AUTO: 3.84 M/UL (ref 4–5.4)
SODIUM SERPL-SCNC: 140 MMOL/L (ref 136–145)
TRIGL SERPL-MCNC: 56 MG/DL (ref 30–150)
TSH SERPL DL<=0.005 MIU/L-ACNC: 1.91 UIU/ML (ref 0.4–4)
WBC # BLD AUTO: 5.77 K/UL (ref 3.9–12.7)

## 2021-10-06 PROCEDURE — 85025 COMPLETE CBC W/AUTO DIFF WBC: CPT | Performed by: INTERNAL MEDICINE

## 2021-10-06 PROCEDURE — 84443 ASSAY THYROID STIM HORMONE: CPT | Performed by: INTERNAL MEDICINE

## 2021-10-06 PROCEDURE — 80053 COMPREHEN METABOLIC PANEL: CPT | Performed by: INTERNAL MEDICINE

## 2021-10-06 PROCEDURE — 36415 COLL VENOUS BLD VENIPUNCTURE: CPT | Performed by: INTERNAL MEDICINE

## 2021-10-06 PROCEDURE — 80061 LIPID PANEL: CPT | Performed by: INTERNAL MEDICINE

## 2021-10-06 PROCEDURE — 86803 HEPATITIS C AB TEST: CPT | Performed by: INTERNAL MEDICINE

## 2021-10-07 LAB — HCV AB SERPL QL IA: NEGATIVE

## 2021-10-14 ENCOUNTER — OFFICE VISIT (OUTPATIENT)
Dept: INTERNAL MEDICINE | Facility: CLINIC | Age: 81
End: 2021-10-14
Payer: MEDICARE

## 2021-10-14 VITALS
WEIGHT: 137.56 LBS | OXYGEN SATURATION: 95 % | RESPIRATION RATE: 18 BRPM | DIASTOLIC BLOOD PRESSURE: 56 MMHG | HEART RATE: 65 BPM | HEIGHT: 65 IN | BODY MASS INDEX: 22.92 KG/M2 | SYSTOLIC BLOOD PRESSURE: 122 MMHG | TEMPERATURE: 99 F

## 2021-10-14 DIAGNOSIS — F32.A DEPRESSION, UNSPECIFIED DEPRESSION TYPE: ICD-10-CM

## 2021-10-14 DIAGNOSIS — E78.5 HYPERLIPIDEMIA, UNSPECIFIED HYPERLIPIDEMIA TYPE: ICD-10-CM

## 2021-10-14 DIAGNOSIS — I10 PRIMARY HYPERTENSION: Primary | ICD-10-CM

## 2021-10-14 DIAGNOSIS — F41.9 ANXIETY: ICD-10-CM

## 2021-10-14 DIAGNOSIS — Z78.0 ASYMPTOMATIC MENOPAUSAL STATE: ICD-10-CM

## 2021-10-14 DIAGNOSIS — M85.80 OSTEOPENIA, UNSPECIFIED LOCATION: ICD-10-CM

## 2021-10-14 DIAGNOSIS — R21 RASH: ICD-10-CM

## 2021-10-14 PROCEDURE — 99999 PR PBB SHADOW E&M-EST. PATIENT-LVL IV: CPT | Mod: PBBFAC,,, | Performed by: INTERNAL MEDICINE

## 2021-10-14 PROCEDURE — 99214 OFFICE O/P EST MOD 30 MIN: CPT | Mod: S$PBB,,, | Performed by: INTERNAL MEDICINE

## 2021-10-14 PROCEDURE — 90694 VACC AIIV4 NO PRSRV 0.5ML IM: CPT | Mod: PBBFAC

## 2021-10-14 PROCEDURE — G0008 ADMIN INFLUENZA VIRUS VAC: HCPCS | Mod: PBBFAC

## 2021-10-14 PROCEDURE — 99214 PR OFFICE/OUTPT VISIT, EST, LEVL IV, 30-39 MIN: ICD-10-PCS | Mod: S$PBB,,, | Performed by: INTERNAL MEDICINE

## 2021-10-14 PROCEDURE — 99214 OFFICE O/P EST MOD 30 MIN: CPT | Mod: PBBFAC | Performed by: INTERNAL MEDICINE

## 2021-10-14 PROCEDURE — 99999 PR PBB SHADOW E&M-EST. PATIENT-LVL IV: ICD-10-PCS | Mod: PBBFAC,,, | Performed by: INTERNAL MEDICINE

## 2021-10-14 RX ORDER — TRIAMCINOLONE ACETONIDE 1 MG/G
CREAM TOPICAL 2 TIMES DAILY
Qty: 45 G | Refills: 0 | Status: SHIPPED | OUTPATIENT
Start: 2021-10-14 | End: 2021-12-09

## 2021-10-14 RX ORDER — ESCITALOPRAM OXALATE 20 MG/1
20 TABLET ORAL DAILY
Qty: 90 TABLET | Refills: 3 | Status: SHIPPED | OUTPATIENT
Start: 2021-10-14 | End: 2022-11-30

## 2021-10-14 RX ORDER — VITAMIN B COMPLEX
1 CAPSULE ORAL DAILY
COMMUNITY

## 2021-10-14 RX ORDER — RAMIPRIL 2.5 MG/1
2.5 CAPSULE ORAL DAILY
Qty: 90 CAPSULE | Refills: 3 | Status: SHIPPED | OUTPATIENT
Start: 2021-10-14 | End: 2022-11-11

## 2021-10-14 RX ORDER — ATORVASTATIN CALCIUM 10 MG/1
10 TABLET, FILM COATED ORAL DAILY
Qty: 90 TABLET | Refills: 2 | Status: SHIPPED | OUTPATIENT
Start: 2021-10-14 | End: 2022-11-11

## 2021-10-19 ENCOUNTER — TELEPHONE (OUTPATIENT)
Dept: INTERNAL MEDICINE | Facility: CLINIC | Age: 81
End: 2021-10-19

## 2021-10-19 ENCOUNTER — APPOINTMENT (OUTPATIENT)
Dept: RADIOLOGY | Facility: HOSPITAL | Age: 81
End: 2021-10-19
Attending: INTERNAL MEDICINE
Payer: MEDICARE

## 2021-10-19 DIAGNOSIS — Z78.0 ASYMPTOMATIC MENOPAUSAL STATE: ICD-10-CM

## 2021-10-19 DIAGNOSIS — M85.80 OSTEOPENIA, UNSPECIFIED LOCATION: Primary | ICD-10-CM

## 2021-10-19 PROCEDURE — 77080 DXA BONE DENSITY AXIAL: CPT | Mod: TC

## 2021-10-19 PROCEDURE — 77080 DEXA BONE DENSITY SPINE HIP: ICD-10-PCS | Mod: 26,,, | Performed by: RADIOLOGY

## 2021-10-19 PROCEDURE — 77080 DXA BONE DENSITY AXIAL: CPT | Mod: 26,,, | Performed by: RADIOLOGY

## 2021-10-22 ENCOUNTER — TELEPHONE (OUTPATIENT)
Dept: INTERNAL MEDICINE | Facility: CLINIC | Age: 81
End: 2021-10-22
Payer: MEDICARE

## 2021-10-22 DIAGNOSIS — M85.80 OSTEOPENIA, UNSPECIFIED LOCATION: Primary | ICD-10-CM

## 2021-10-31 ENCOUNTER — EXTERNAL CHRONIC CARE MANAGEMENT (OUTPATIENT)
Dept: PRIMARY CARE CLINIC | Facility: CLINIC | Age: 81
End: 2021-10-31
Payer: MEDICARE

## 2021-10-31 PROCEDURE — 99490 PR CHRONIC CARE MGMT, 1ST 20 MIN: ICD-10-PCS | Mod: S$PBB,,, | Performed by: INTERNAL MEDICINE

## 2021-10-31 PROCEDURE — 99490 CHRNC CARE MGMT STAFF 1ST 20: CPT | Mod: PBBFAC | Performed by: INTERNAL MEDICINE

## 2021-10-31 PROCEDURE — 99490 CHRNC CARE MGMT STAFF 1ST 20: CPT | Mod: S$PBB,,, | Performed by: INTERNAL MEDICINE

## 2021-11-24 ENCOUNTER — TELEPHONE (OUTPATIENT)
Dept: RHEUMATOLOGY | Facility: CLINIC | Age: 81
End: 2021-11-24
Payer: MEDICARE

## 2021-11-30 ENCOUNTER — EXTERNAL CHRONIC CARE MANAGEMENT (OUTPATIENT)
Dept: PRIMARY CARE CLINIC | Facility: CLINIC | Age: 81
End: 2021-11-30
Payer: MEDICARE

## 2021-11-30 PROCEDURE — 99490 CHRNC CARE MGMT STAFF 1ST 20: CPT | Mod: S$PBB,,, | Performed by: INTERNAL MEDICINE

## 2021-11-30 PROCEDURE — 99490 PR CHRONIC CARE MGMT, 1ST 20 MIN: ICD-10-PCS | Mod: S$PBB,,, | Performed by: INTERNAL MEDICINE

## 2021-11-30 PROCEDURE — 99490 CHRNC CARE MGMT STAFF 1ST 20: CPT | Mod: PBBFAC | Performed by: INTERNAL MEDICINE

## 2021-12-09 ENCOUNTER — IMMUNIZATION (OUTPATIENT)
Dept: PRIMARY CARE CLINIC | Facility: CLINIC | Age: 81
End: 2021-12-09
Payer: MEDICARE

## 2021-12-09 ENCOUNTER — PES CALL (OUTPATIENT)
Dept: ADMINISTRATIVE | Facility: CLINIC | Age: 81
End: 2021-12-09
Payer: MEDICARE

## 2021-12-09 DIAGNOSIS — Z23 NEED FOR VACCINATION: Primary | ICD-10-CM

## 2021-12-09 PROCEDURE — 0004A COVID-19, MRNA, LNP-S, PF, 30 MCG/0.3 ML DOSE VACCINE: CPT | Mod: CV19,PBBFAC | Performed by: FAMILY MEDICINE

## 2021-12-31 ENCOUNTER — EXTERNAL CHRONIC CARE MANAGEMENT (OUTPATIENT)
Dept: PRIMARY CARE CLINIC | Facility: CLINIC | Age: 81
End: 2021-12-31
Payer: MEDICARE

## 2021-12-31 PROCEDURE — 99490 CHRNC CARE MGMT STAFF 1ST 20: CPT | Mod: S$PBB,,, | Performed by: INTERNAL MEDICINE

## 2021-12-31 PROCEDURE — 99490 PR CHRONIC CARE MGMT, 1ST 20 MIN: ICD-10-PCS | Mod: S$PBB,,, | Performed by: INTERNAL MEDICINE

## 2021-12-31 PROCEDURE — 99490 CHRNC CARE MGMT STAFF 1ST 20: CPT | Mod: PBBFAC | Performed by: INTERNAL MEDICINE

## 2022-01-05 DIAGNOSIS — M85.80 OSTEOPENIA, UNSPECIFIED LOCATION: ICD-10-CM

## 2022-01-05 RX ORDER — ALENDRONATE SODIUM 70 MG/1
70 TABLET ORAL WEEKLY
Qty: 12 TABLET | Refills: 1 | Status: SHIPPED | OUTPATIENT
Start: 2022-01-05 | End: 2022-06-14

## 2022-01-31 ENCOUNTER — EXTERNAL CHRONIC CARE MANAGEMENT (OUTPATIENT)
Dept: PRIMARY CARE CLINIC | Facility: CLINIC | Age: 82
End: 2022-01-31
Payer: MEDICARE

## 2022-01-31 PROCEDURE — 99490 PR CHRONIC CARE MGMT, 1ST 20 MIN: ICD-10-PCS | Mod: S$PBB,,, | Performed by: INTERNAL MEDICINE

## 2022-01-31 PROCEDURE — 99490 CHRNC CARE MGMT STAFF 1ST 20: CPT | Mod: PBBFAC | Performed by: INTERNAL MEDICINE

## 2022-01-31 PROCEDURE — 99490 CHRNC CARE MGMT STAFF 1ST 20: CPT | Mod: S$PBB,,, | Performed by: INTERNAL MEDICINE

## 2022-02-15 ENCOUNTER — OFFICE VISIT (OUTPATIENT)
Dept: CARDIOLOGY | Facility: CLINIC | Age: 82
End: 2022-02-15
Payer: MEDICARE

## 2022-02-15 ENCOUNTER — HOSPITAL ENCOUNTER (OUTPATIENT)
Dept: CARDIOLOGY | Facility: HOSPITAL | Age: 82
Discharge: HOME | End: 2022-02-15
Attending: INTERNAL MEDICINE
Payer: MEDICARE

## 2022-02-15 VITALS
WEIGHT: 135.56 LBS | BODY MASS INDEX: 22.56 KG/M2 | OXYGEN SATURATION: 96 % | HEART RATE: 65 BPM | DIASTOLIC BLOOD PRESSURE: 60 MMHG | SYSTOLIC BLOOD PRESSURE: 122 MMHG

## 2022-02-15 DIAGNOSIS — I10 PRIMARY HYPERTENSION: ICD-10-CM

## 2022-02-15 DIAGNOSIS — I48.0 PAF (PAROXYSMAL ATRIAL FIBRILLATION): ICD-10-CM

## 2022-02-15 DIAGNOSIS — E78.2 MIXED HYPERLIPIDEMIA: ICD-10-CM

## 2022-02-15 DIAGNOSIS — I48.0 PAF (PAROXYSMAL ATRIAL FIBRILLATION): Primary | ICD-10-CM

## 2022-02-15 DIAGNOSIS — Z83.2 FAMILY HISTORY OF FACTOR V LEIDEN MUTATION: ICD-10-CM

## 2022-02-15 PROCEDURE — 99214 PR OFFICE/OUTPT VISIT, EST, LEVL IV, 30-39 MIN: ICD-10-PCS | Mod: S$PBB,,, | Performed by: INTERNAL MEDICINE

## 2022-02-15 PROCEDURE — 93010 ELECTROCARDIOGRAM REPORT: CPT | Mod: ,,, | Performed by: STUDENT IN AN ORGANIZED HEALTH CARE EDUCATION/TRAINING PROGRAM

## 2022-02-15 PROCEDURE — 99214 OFFICE O/P EST MOD 30 MIN: CPT | Mod: S$PBB,,, | Performed by: INTERNAL MEDICINE

## 2022-02-15 PROCEDURE — 99999 PR PBB SHADOW E&M-EST. PATIENT-LVL III: ICD-10-PCS | Mod: PBBFAC,,, | Performed by: INTERNAL MEDICINE

## 2022-02-15 PROCEDURE — 99999 PR PBB SHADOW E&M-EST. PATIENT-LVL III: CPT | Mod: PBBFAC,,, | Performed by: INTERNAL MEDICINE

## 2022-02-15 PROCEDURE — 93010 EKG 12-LEAD: ICD-10-PCS | Mod: ,,, | Performed by: STUDENT IN AN ORGANIZED HEALTH CARE EDUCATION/TRAINING PROGRAM

## 2022-02-15 PROCEDURE — 93005 ELECTROCARDIOGRAM TRACING: CPT

## 2022-02-15 PROCEDURE — 99213 OFFICE O/P EST LOW 20 MIN: CPT | Mod: PBBFAC | Performed by: INTERNAL MEDICINE

## 2022-02-15 NOTE — PROGRESS NOTES
Subjective:   Patient ID:  Nerissa Burnham is a 82 y.o. female who presents for follow up of No chief complaint on file.      83 yo female 6 months f/u  PMH PAF, HTN, HLD and depression.  No smoking.  passed away in . Since then, the palpitation seems worse.    ECHo showed normal EF, mild AI, LAE and garde I DD  holter showed PAF 7% burden in . ToprolXL 25 mg added.  Echo 09/2020 normal biv function and mild AI     visit. palpitation once a month. And could last for 3 hours. No associated dizziness faint and SOB. Lying on left side made it worse. Occurred at night.  Quit alcohol now.  No faint, dizziness and chest pain/dyspnea.  Sometime feels knees imbalanced but no fall. More at night when uses the bathroom.  Today EKG NSR   BP LDL and BS controlled     visit.  Palpitation improved, and worse at night lying on left side, less frequency compared to 6 months ago after quit the alcohol.  No faint dizziness dyspnea and chest pain  No active bleeding and bruise.     visit  Palpitation occasionally, when doing things fast. Go to bde later and may have the palpitation. no dizziness faint SOB, leg swelling.   Work out twice a week at [facility]. No active bleeding. Lives alone.   Mild imbalance     Interval history.  Recent muscle pulling on left hip and had a lot of pain. On cane ekg today NSR  Today BP controlled. No dizziness faint and sob and leg swelling   Med compliance and no active bleeding. No fall                Past Medical History:   Diagnosis Date    Depression     Encounter for blood transfusion     Hyperlipidemia     Hypertension        Past Surgical History:   Procedure Laterality Date    AUGMENTATION OF BREAST      BREAST SURGERY Bilateral     Augmentation    COLONOSCOPY N/A 1/10/2020    Procedure: COLONOSCOPY;  Surgeon: Donna Pruitt MD;  Location: [facility];  Service: Endoscopy;  Laterality: N/A;    HYSTERECTOMY      OOPHORECTOMY         Social  History     Tobacco Use    Smoking status: Never Smoker    Smokeless tobacco: Never Used   Substance Use Topics    Alcohol use: Yes     Comment: socially    Drug use: No       Family History   Problem Relation Age of Onset    Cancer Mother         Breast    Breast cancer Mother     No Known Problems Father     Breast cancer Maternal Aunt          Review of Systems   Constitutional: Negative for decreased appetite, diaphoresis, fever, malaise/fatigue and night sweats.   HENT: Negative for nosebleeds.    Eyes: Negative for blurred vision and double vision.   Cardiovascular: Positive for palpitations. Negative for chest pain, claudication, dyspnea on exertion, irregular heartbeat, leg swelling, near-syncope, orthopnea, paroxysmal nocturnal dyspnea and syncope.   Respiratory: Negative for cough, shortness of breath, sleep disturbances due to breathing, snoring, sputum production and wheezing.    Endocrine: Negative for cold intolerance and polyuria.   Hematologic/Lymphatic: Does not bruise/bleed easily.   Skin: Negative for rash.   Musculoskeletal: Negative for back pain, falls, joint pain, joint swelling and neck pain.   Gastrointestinal: Negative for abdominal pain, heartburn, nausea and vomiting.   Genitourinary: Negative for dysuria, frequency and hematuria.   Neurological: Negative for difficulty with concentration, dizziness, focal weakness, headaches, light-headedness, numbness, seizures and weakness.   Psychiatric/Behavioral: Negative for depression, memory loss and substance abuse. The patient does not have insomnia.    Allergic/Immunologic: Negative for HIV exposure and hives.       Objective:   Physical Exam  HENT:      Head: Normocephalic.   Eyes:      Pupils: Pupils are equal, round, and reactive to light.   Neck:      Thyroid: No thyromegaly.      Vascular: Normal carotid pulses. No carotid bruit or JVD.   Cardiovascular:      Rate and Rhythm: Normal rate and regular rhythm.  No extrasystoles are  present.     Chest Wall: PMI is not displaced.      Pulses: Normal pulses.      Heart sounds: Murmur heard.   No gallop. No S3 sounds.       Comments: 1/6 ESM on URSB  Pulmonary:      Effort: No respiratory distress.      Breath sounds: Normal breath sounds. No stridor.   Abdominal:      General: Bowel sounds are normal.      Palpations: Abdomen is soft.      Tenderness: There is no abdominal tenderness. There is no rebound.   Musculoskeletal:         General: Normal range of motion.   Skin:     Findings: No rash.   Neurological:      Mental Status: She is alert and oriented to person, place, and time.   Psychiatric:         Behavior: Behavior normal.         Lab Results   Component Value Date    CHOL 151 10/06/2021    CHOL 155 10/29/2020    CHOL 155 10/25/2019     Lab Results   Component Value Date    HDL 67 10/06/2021    HDL 58 10/29/2020    HDL 58 10/25/2019     Lab Results   Component Value Date    LDLCALC 72.8 10/06/2021    LDLCALC 83.0 10/29/2020    LDLCALC 86.2 10/25/2019     Lab Results   Component Value Date    TRIG 56 10/06/2021    TRIG 70 10/29/2020    TRIG 54 10/25/2019     Lab Results   Component Value Date    CHOLHDL 44.4 10/06/2021    CHOLHDL 37.4 10/29/2020    CHOLHDL 37.4 10/25/2019       Chemistry        Component Value Date/Time     10/06/2021 1055    K 4.5 10/06/2021 1055     10/06/2021 1055    CO2 24 10/06/2021 1055    BUN 24 (H) 10/06/2021 1055    CREATININE 0.9 10/06/2021 1055    GLU 82 10/06/2021 1055        Component Value Date/Time    CALCIUM 9.5 10/06/2021 1055    ALKPHOS 36 (L) 10/06/2021 1055    AST 20 10/06/2021 1055    ALT 15 10/06/2021 1055    BILITOT 0.6 10/06/2021 1055    ESTGFRAFRICA >60.0 10/06/2021 1055    EGFRNONAA >60.0 10/06/2021 1055          No results found for: LABA1C, HGBA1C  Lab Results   Component Value Date    TSH 1.909 10/06/2021     No results found for: INR, PROTIME  Lab Results   Component Value Date    WBC 5.77 10/06/2021    HGB 12.6 10/06/2021    HCT  37.7 10/06/2021    MCV 98 10/06/2021     10/06/2021     BMP  Sodium   Date Value Ref Range Status   10/06/2021 140 136 - 145 mmol/L Final     Potassium   Date Value Ref Range Status   10/06/2021 4.5 3.5 - 5.1 mmol/L Final     Chloride   Date Value Ref Range Status   10/06/2021 106 95 - 110 mmol/L Final     CO2   Date Value Ref Range Status   10/06/2021 24 23 - 29 mmol/L Final     BUN   Date Value Ref Range Status   10/06/2021 24 (H) 8 - 23 mg/dL Final     Creatinine   Date Value Ref Range Status   10/06/2021 0.9 0.5 - 1.4 mg/dL Final     Calcium   Date Value Ref Range Status   10/06/2021 9.5 8.7 - 10.5 mg/dL Final     Anion Gap   Date Value Ref Range Status   10/06/2021 10 8 - 16 mmol/L Final     eGFR if    Date Value Ref Range Status   10/06/2021 >60.0 >60 mL/min/1.73 m^2 Final     eGFR if non    Date Value Ref Range Status   10/06/2021 >60.0 >60 mL/min/1.73 m^2 Final     Comment:     Calculation used to obtain the estimated glomerular filtration  rate (eGFR) is the CKD-EPI equation.        BNP  @LABRCNTIP(BNP,BNPTRIAGEBLO)@  @LABRCNTIP(troponini)@  CrCl cannot be calculated (Patient's most recent lab result is older than the maximum 7 days allowed.).  No results found in the last 24 hours.  No results found in the last 24 hours.  No results found in the last 24 hours.    Assessment:      1. PAF (paroxysmal atrial fibrillation)    2. Mixed hyperlipidemia    3. Primary hypertension    4. Family history of factor V Leiden mutation        Plan:   Continue ramipril metoprolol xarelto 20 mg and statin  Check BP at home    Counseled DASH  Check Lipid profile in 6 months  Recommend heart-healthy diet, weight control and regular exercise.  Dian. Risk modification.   I have reviewed all pertinent labs and cardiac studies independently. Plans and recommendations have been formulated under my direct supervision. All questions answered and patient voiced understanding.   If symptoms  persist go to the ED  RTC in 6 months

## 2022-02-16 ENCOUNTER — PATIENT OUTREACH (OUTPATIENT)
Dept: ADMINISTRATIVE | Facility: HOSPITAL | Age: 82
End: 2022-02-16
Payer: MEDICARE

## 2022-02-23 ENCOUNTER — TELEPHONE (OUTPATIENT)
Dept: INTERNAL MEDICINE | Facility: CLINIC | Age: 82
End: 2022-02-23
Payer: MEDICARE

## 2022-02-23 DIAGNOSIS — M25.559 HIP PAIN: Primary | ICD-10-CM

## 2022-02-23 NOTE — TELEPHONE ENCOUNTER
----- Message from Renetta Lares sent at 2/23/2022  9:34 AM CST -----  .Type:  Patient Requesting Referral    Who Called: Self     Does the patient already have the specialty appointment scheduled?: No     If yes, what is the date of that appointment?: N/A    Referral to What Specialty: Orthopedic     Reason for Referral: Left hip pain     Does the patient want the referral with a specific physician?: No     [facility]     Patient Requesting a Response?: Yes call back     Would the patient rather a call back or a response via [facility]? Call back     Best Call Back Number: [phone]    Thank you

## 2022-02-28 ENCOUNTER — EXTERNAL CHRONIC CARE MANAGEMENT (OUTPATIENT)
Dept: PRIMARY CARE CLINIC | Facility: CLINIC | Age: 82
End: 2022-02-28
Payer: MEDICARE

## 2022-02-28 PROCEDURE — 99490 CHRNC CARE MGMT STAFF 1ST 20: CPT | Mod: PBBFAC | Performed by: INTERNAL MEDICINE

## 2022-02-28 PROCEDURE — 99490 PR CHRONIC CARE MGMT, 1ST 20 MIN: ICD-10-PCS | Mod: S$PBB,,, | Performed by: INTERNAL MEDICINE

## 2022-02-28 PROCEDURE — 99490 CHRNC CARE MGMT STAFF 1ST 20: CPT | Mod: S$PBB,,, | Performed by: INTERNAL MEDICINE

## 2022-03-04 ENCOUNTER — HOSPITAL ENCOUNTER (OUTPATIENT)
Dept: RADIOLOGY | Facility: HOSPITAL | Age: 82
Discharge: HOME | End: 2022-03-04
Attending: INTERNAL MEDICINE
Payer: MEDICARE

## 2022-03-04 ENCOUNTER — OFFICE VISIT (OUTPATIENT)
Dept: INTERNAL MEDICINE | Facility: CLINIC | Age: 82
End: 2022-03-04
Payer: MEDICARE

## 2022-03-04 VITALS
BODY MASS INDEX: 22.75 KG/M2 | HEART RATE: 60 BPM | WEIGHT: 136.69 LBS | SYSTOLIC BLOOD PRESSURE: 118 MMHG | OXYGEN SATURATION: 94 % | DIASTOLIC BLOOD PRESSURE: 70 MMHG

## 2022-03-04 DIAGNOSIS — M54.16 LUMBAR RADICULOPATHY: Primary | ICD-10-CM

## 2022-03-04 DIAGNOSIS — M54.16 LUMBAR RADICULOPATHY: ICD-10-CM

## 2022-03-04 PROCEDURE — 99999 PR PBB SHADOW E&M-EST. PATIENT-LVL IV: ICD-10-PCS | Mod: PBBFAC,,, | Performed by: INTERNAL MEDICINE

## 2022-03-04 PROCEDURE — 99214 PR OFFICE/OUTPT VISIT, EST, LEVL IV, 30-39 MIN: ICD-10-PCS | Mod: S$PBB,,, | Performed by: INTERNAL MEDICINE

## 2022-03-04 PROCEDURE — 72100 X-RAY EXAM L-S SPINE 2/3 VWS: CPT | Mod: TC

## 2022-03-04 PROCEDURE — 99214 OFFICE O/P EST MOD 30 MIN: CPT | Mod: S$PBB,,, | Performed by: INTERNAL MEDICINE

## 2022-03-04 PROCEDURE — 72100 XR LUMBAR SPINE AP AND LATERAL: ICD-10-PCS | Mod: 26,,, | Performed by: RADIOLOGY

## 2022-03-04 PROCEDURE — 99999 PR PBB SHADOW E&M-EST. PATIENT-LVL IV: CPT | Mod: PBBFAC,,, | Performed by: INTERNAL MEDICINE

## 2022-03-04 PROCEDURE — 72100 X-RAY EXAM L-S SPINE 2/3 VWS: CPT | Mod: 26,,, | Performed by: RADIOLOGY

## 2022-03-04 PROCEDURE — 99214 OFFICE O/P EST MOD 30 MIN: CPT | Mod: PBBFAC | Performed by: INTERNAL MEDICINE

## 2022-03-04 RX ORDER — METHYLPREDNISOLONE 4 MG/1
TABLET ORAL
Qty: 1 EACH | Refills: 0 | Status: SHIPPED | OUTPATIENT
Start: 2022-03-04 | End: 2022-06-14

## 2022-03-04 NOTE — PROGRESS NOTES
Subjective:      Patient ID: Nerissa Burnham is a 82 y.o. female.    Chief Complaint: Leg Pain    HPI     83 yo with   Patient Active Problem List   Diagnosis    Hypertension    Hyperlipidemia    Depression    Osteopenia    HSV-2 infection    Anxiety    GERD (gastroesophageal reflux disease)    Palpitation    PAF (paroxysmal atrial fibrillation)    Diarrhea    Family history of factor V Leiden mutation    Nonrheumatic aortic valve insufficiency     Past Medical History:   Diagnosis Date    Depression     Encounter for blood transfusion     Hyperlipidemia     Hypertension      Here today c/o leg pain.   Number patient reported noticing left hip pain radiating into her her left thigh about 4 weeks ago the day following her usual low-impact aerobics session.  Hip pain has improved.  But continues with left thigh pain.  Worse with extensive walking.  Worse if on her toes.  No significant pain today.  Review of Systems   No bowel or bladder incontinence.  Denies weakness.  Objective:   /70 (BP Location: Left arm, Patient Position: Sitting, BP Method: Medium (Manual))   Pulse 60   Wt 62 kg (136 lb 11 oz)   SpO2 (!) 94%   BMI 22.75 kg/m²     Physical Exam  Constitutional:       General: She is not in acute distress.     Appearance: She is well-developed.   Cardiovascular:      Rate and Rhythm: Normal rate.   Pulmonary:      Effort: Pulmonary effort is normal.   Skin:     General: Skin is warm and dry.   Neurological:      Mental Status: She is alert.   Psychiatric:         Behavior: Behavior normal.     SLT bilaterally negative.  Good range of motion and strength at ankles and knees.  There is decreased strength with left hip flexion.    Assessment:     1. Lumbar radiculopathy      Plan:   Lumbar radiculopathy  -     methylPREDNISolone (MEDROL, OLGA,) 4 mg tablet; use as directed  Dispense: 1 each; Refill: 0  -     X-Ray Lumbar Spine Ap And Lateral; Future; Expected date: 03/04/2022    ER precautions  discussed.  Notify me if no resolution.  Consider physical therapy versus further imaging.    Lab Frequency Next Occurrence   Ambulatory referral/consult to [facility] Once 10/26/2021   Ambulatory referral/consult to Rheumatology Once 11/02/2021   Ambulatory referral/consult to Orthopedics Once 03/02/2022       Problem List Items Addressed This Visit    None     Visit Diagnoses     Lumbar radiculopathy    -  Primary    Relevant Medications    methylPREDNISolone (MEDROL, OLGA,) 4 mg tablet    Other Relevant Orders    X-Ray Lumbar Spine Ap And Lateral (Completed)          No follow-ups on file.

## 2022-03-07 ENCOUNTER — TELEPHONE (OUTPATIENT)
Dept: RHEUMATOLOGY | Facility: CLINIC | Age: 82
End: 2022-03-07
Payer: MEDICARE

## 2022-03-15 ENCOUNTER — TELEPHONE (OUTPATIENT)
Dept: INTERNAL MEDICINE | Facility: CLINIC | Age: 82
End: 2022-03-15
Payer: MEDICARE

## 2022-03-15 NOTE — TELEPHONE ENCOUNTER
----- Message from Anu Camargo sent at 3/15/2022  3:14 PM CDT -----  Contact: Self   Pt is requesting a call regarding questions she had for nurse. Please advise

## 2022-03-25 ENCOUNTER — HOSPITAL ENCOUNTER (OUTPATIENT)
Dept: RADIOLOGY | Facility: HOSPITAL | Age: 82
Discharge: HOME | End: 2022-03-25
Attending: STUDENT IN AN ORGANIZED HEALTH CARE EDUCATION/TRAINING PROGRAM
Payer: MEDICARE

## 2022-03-25 ENCOUNTER — OFFICE VISIT (OUTPATIENT)
Dept: SPORTS MEDICINE | Facility: CLINIC | Age: 82
End: 2022-03-25
Payer: MEDICARE

## 2022-03-25 VITALS — HEIGHT: 65 IN | WEIGHT: 136.25 LBS | BODY MASS INDEX: 22.7 KG/M2

## 2022-03-25 DIAGNOSIS — M25.559 HIP PAIN: ICD-10-CM

## 2022-03-25 DIAGNOSIS — M25.559 HIP PAIN: Primary | ICD-10-CM

## 2022-03-25 DIAGNOSIS — M54.16 LUMBAR RADICULOPATHY: ICD-10-CM

## 2022-03-25 PROCEDURE — 73502 XR HIP WITH PELVIS WHEN PERFORMED, 2 OR 3 VIEWS LEFT: ICD-10-PCS | Mod: 26,LT,, | Performed by: RADIOLOGY

## 2022-03-25 PROCEDURE — 99213 OFFICE O/P EST LOW 20 MIN: CPT | Mod: PBBFAC | Performed by: STUDENT IN AN ORGANIZED HEALTH CARE EDUCATION/TRAINING PROGRAM

## 2022-03-25 PROCEDURE — 99204 OFFICE O/P NEW MOD 45 MIN: CPT | Mod: S$PBB,,, | Performed by: STUDENT IN AN ORGANIZED HEALTH CARE EDUCATION/TRAINING PROGRAM

## 2022-03-25 PROCEDURE — 99999 PR PBB SHADOW E&M-EST. PATIENT-LVL III: ICD-10-PCS | Mod: PBBFAC,,, | Performed by: STUDENT IN AN ORGANIZED HEALTH CARE EDUCATION/TRAINING PROGRAM

## 2022-03-25 PROCEDURE — 73502 X-RAY EXAM HIP UNI 2-3 VIEWS: CPT | Mod: 26,LT,, | Performed by: RADIOLOGY

## 2022-03-25 PROCEDURE — 73502 X-RAY EXAM HIP UNI 2-3 VIEWS: CPT | Mod: TC,LT

## 2022-03-25 PROCEDURE — 99999 PR PBB SHADOW E&M-EST. PATIENT-LVL III: CPT | Mod: PBBFAC,,, | Performed by: STUDENT IN AN ORGANIZED HEALTH CARE EDUCATION/TRAINING PROGRAM

## 2022-03-25 PROCEDURE — 99204 PR OFFICE/OUTPT VISIT, NEW, LEVL IV, 45-59 MIN: ICD-10-PCS | Mod: S$PBB,,, | Performed by: STUDENT IN AN ORGANIZED HEALTH CARE EDUCATION/TRAINING PROGRAM

## 2022-03-25 NOTE — PATIENT INSTRUCTIONS
Assessment:  Nerissa Burnham is a 82 y.o. female   Chief Complaint   Patient presents with    Left Hip - Pain       Encounter Diagnoses   Name Primary?    Hip pain Yes    Lumbar radiculopathy         Plan:  Nerve Conduction study test for your thigh pain    Follow-up: After EMG or sooner if there are any problems between now and then.    Thank you for choosing [facility] and Dr. Gustavo Raines for your orthopedic & sports medicine care. It is our goal to provide you with exceptional care that will help keep you healthy, active, and get you back in the game.    Please do not hesitate to reach out to us via email, phone, or MyChart with any questions, concerns, or feedback.    If you felt that you received exemplary care today, please consider leaving us feedback on [employer] at:  [email or web address]    If you are experiencing pain/discomfort ,or have questions after 5pm and would like to be connected to the [facility] on-call team, please call this number and specify which Sports Medicine provider is treating you: [phone]

## 2022-03-25 NOTE — PROGRESS NOTES
Patient ID: Nerissa Burnham  YOB: 1940  MRN: [id number]    Chief Complaint: Pain of the Left Hip      Referred By: Avelino Gudino MD for Left Hip Pain    History of Present Illness: Nerissa Burnham is a right-hand dominant 82 y.o. female who presents today with 4/10 left hip pain.     The patient is active in none.  Occupation: retired    82-year-old female very active enjoys doing workout classes presents today for left anterior thigh pain.  She has been having this pain for about 2 months now slowly getting better, noticed it after doing some ball raises with her legs working on core at the end of 1 of her workup classes about 2 months ago.  She denies any significant back pain never had issues with the back in the past, denies any problems controlling bowel or bladder, pain does not radiate past the knee.  She denies any weakness associated she is unable to reproduce the pain with palpation feels deep her knee and slowly has been getting better over time but still worse with certain activities such as prolonged sitting or prolonged walking.  Nothing specifically seem to make it better, except for the 1st 2 days of Medrol Dosepak and then the symptoms quickly ran out.    Past Medical History:   Past Medical History:   Diagnosis Date    Depression     Encounter for blood transfusion     Hyperlipidemia     Hypertension      Past Surgical History:   Procedure Laterality Date    AUGMENTATION OF BREAST      BREAST SURGERY Bilateral     Augmentation    COLONOSCOPY N/A 1/10/2020    Procedure: COLONOSCOPY;  Surgeon: Donna Pruitt MD;  Location: [facility];  Service: Endoscopy;  Laterality: N/A;    HYSTERECTOMY      OOPHORECTOMY       Family History   Problem Relation Age of Onset    Cancer Mother         Breast    Breast cancer Mother     No Known Problems Father     Breast cancer Maternal Aunt      Social History     Socioeconomic History    Marital status:    Tobacco Use     Smoking status: Never Smoker    Smokeless tobacco: Never Used   Substance and Sexual Activity    Alcohol use: Yes     Comment: socially    Drug use: No     Medication List with Changes/Refills   Current Medications    ALENDRONATE (FOSAMAX) 70 MG TABLET    TAKE 1 TABLET (70 MG TOTAL) BY MOUTH ONCE A WEEK.    AMMONIUM LACTATE 12 % CREA    Apply 1 Act topically 2 (two) times daily.    ASCORBIC ACID, VITAMIN C, (VITAMIN C) 1000 MG TABLET    Take 1,000 mg by mouth once daily.    ATORVASTATIN (LIPITOR) 10 MG TABLET    Take 1 tablet (10 mg total) by mouth once daily.    B COMPLEX VITAMINS CAPSULE    Take 1 capsule by mouth once daily.    CALCIUM CARBONATE (CALCIUM 600 ORAL)    Take 1,200 mg by mouth once daily.    CHOLECALCIFEROL, VITAMIN D3, (VITAMIN D3) 25 MCG (1,000 UNIT) CAPSULE    Take 1,000 Units by mouth once daily.    ESCITALOPRAM OXALATE (LEXAPRO) 20 MG TABLET    Take 1 tablet (20 mg total) by mouth once daily.    METHYLPREDNISOLONE (MEDROL, OLGA,) 4 MG TABLET    use as directed    METOPROLOL SUCCINATE (TOPROL-XL) 25 MG 24 HR TABLET    TAKE 1 TABLET BY MOUTH EVERY DAY    RAMIPRIL (ALTACE) 2.5 MG CAPSULE    Take 1 capsule (2.5 mg total) by mouth once daily.    TRIAMCINOLONE ACETONIDE 0.1% (KENALOG) 0.1 % CREAM    APPLY TO AFFECTED AREA TWICE A DAY    VALACYCLOVIR (VALTREX) 500 MG TABLET    TAKE 1 TABLET BY MOUTH EVERY DAY    XARELTO 20 MG TAB    TAKE 1 TABLET (20 MG TOTAL) BY MOUTH DAILY WITH DINNER OR EVENING MEAL.     Review of patient's allergies indicates:  No Known Allergies    Physical Exam:   Body mass index is 22.67 kg/m².    GENERAL: Well appearing, in no acute distress.  HEAD: Normocephalic and atraumatic.  ENT: External ears and nose grossly normal.  EYES: EOMI bilaterally  PULMONARY: Respirations are grossly even and non-labored.  NEURO: Awake, alert, and oriented x 3.  SKIN: No obvious rashes appreciated.  PSYCH: Mood & affect are appropriate.    Detailed MSK exam:     Back exam  Full flexion  extension some pain over the left multifidus with lateral twisting and slight decreased range of motion some pain with lateral bending negative straight leg raise good strength with upright hip flexion knee extension flexion as well as ankle dorsiflexion plantar flexion 2+ DTR the patella bilaterally sensation grossly intact throughout the lower extremity no tenderness over the area of interest over the distal anterior thigh negative fulcrum test hip range of motion and strength appears normal no pain with supine or upright knee extension or straight leg raise and extension.    Imaging:    Narrative & Impression  EXAMINATION:  XR LUMBAR SPINE AP AND LATERAL     CLINICAL HISTORY:  Radiculopathy, lumbar region     TECHNIQUE:  AP, lateral and spot images were performed of the lumbar spine.     COMPARISON:  None     FINDINGS:  There is mild dextrocurvature of the lumbar spine.  Grade 1 anterolisthesis of L3 on L4.  No fracture or pars defect identified.  Moderate to severe degenerative disc disease involving the L4-L5 and L5-S1 levels.  Mild disc height loss at the L3-L4 level.  Multilevel inferior lumbar spine predominant facet arthropathy.  Sacroiliac joints appear normal.  No osseous erosion or suspicious osseous lesion.     Impression:     As above.        Electronically signed by: Carlos Medina  Date:                                            03/04/2022  Time:                                           11:31    Narrative & Impression  EXAMINATION:  XR HIP WITH PELVIS WHEN PERFORMED, 2 OR 3 VIEWS LEFT     CLINICAL HISTORY:  Pain in unspecified hip     TECHNIQUE:  AP view of the pelvis and frog leg lateral view of the left hip were performed.     COMPARISON:  None     FINDINGS:  There is no evidence to suggest acute fracture or dislocation.  The hip joint space is relatively well preserved.  No plain film evidence to suggest AVN.     Impression:     As above        Electronically signed by: Amor Jackson  DO  Date:                                            03/25/2022  Time:                                           16:01    Relevant imaging results were reviewed and interpreted by me and per my read as above.  This was discussed with the patient and / or family today.     Assessment:  Nerissa Burnham is a 82 y.o. female presents today for left anterior thigh pain most consistent and concerning for possible lumbar radiculopathy.  She has significant degenerative changes at L4-L5 and S1 she has some minor changes throughout the rest the lumbar spine concern for possible subacute disc herniation with her symptoms today.  More than L2-L3 distribution.  She has good strength otherwise and no other red flags noted today.  I am able to reproduce any muscular pain in this area as well.  Discussed formal EMG for further characterization and will follow up afterwards.  Differential also includes were meralgia paresthetica in this region.    Hip pain  -     Ambulatory referral/consult to Orthopedics  -     X-Ray Hip 2 or 3 views Left (with Pelvis when performed); Future; Expected date: 03/25/2022    Lumbar radiculopathy         A copy of today's visit note has been sent to the referring provider.       Gustavo Raines MD    Disclaimer: This note was prepared using a voice recognition system and is likely to have sound alike errors within the text.

## 2022-03-28 ENCOUNTER — TELEPHONE (OUTPATIENT)
Dept: PHYSICAL MEDICINE AND REHAB | Facility: CLINIC | Age: 82
End: 2022-03-28
Payer: MEDICARE

## 2022-03-28 DIAGNOSIS — M54.16 LUMBAR RADICULOPATHY: Primary | ICD-10-CM

## 2022-03-31 ENCOUNTER — EXTERNAL CHRONIC CARE MANAGEMENT (OUTPATIENT)
Dept: PRIMARY CARE CLINIC | Facility: CLINIC | Age: 82
End: 2022-03-31
Payer: MEDICARE

## 2022-03-31 PROCEDURE — 99490 CHRNC CARE MGMT STAFF 1ST 20: CPT | Mod: PBBFAC | Performed by: INTERNAL MEDICINE

## 2022-03-31 PROCEDURE — 99490 PR CHRONIC CARE MGMT, 1ST 20 MIN: ICD-10-PCS | Mod: S$PBB,,, | Performed by: INTERNAL MEDICINE

## 2022-03-31 PROCEDURE — 99490 CHRNC CARE MGMT STAFF 1ST 20: CPT | Mod: S$PBB,,, | Performed by: INTERNAL MEDICINE

## 2022-04-04 ENCOUNTER — TELEPHONE (OUTPATIENT)
Dept: INTERNAL MEDICINE | Facility: CLINIC | Age: 82
End: 2022-04-04
Payer: MEDICARE

## 2022-04-04 NOTE — TELEPHONE ENCOUNTER
Attempted to contact pt to inform her that I have received her paperwork for the assisted living facility, but she needs an appointment to have it filled out.

## 2022-04-12 DIAGNOSIS — B00.9 HSV-2 INFECTION: ICD-10-CM

## 2022-04-12 RX ORDER — VALACYCLOVIR HYDROCHLORIDE 500 MG/1
TABLET, FILM COATED ORAL
Qty: 90 TABLET | Refills: 1 | Status: SHIPPED | OUTPATIENT
Start: 2022-04-12 | End: 2022-11-10

## 2022-04-12 NOTE — TELEPHONE ENCOUNTER
Refill Authorization Note   Nerissa Burnham  is requesting a refill authorization.  Brief Assessment and Rationale for Refill:  Approve     Medication Therapy Plan:       Medication Reconciliation Completed: No   Comments:     No Care Gaps recommended.     Note composed:6:01 PM 04/12/2022

## 2022-04-12 NOTE — TELEPHONE ENCOUNTER
No new care gaps identified.  Powered by [employer]. Reference number: [id number].   4/12/2022 12:08:38 AM CDT

## 2022-04-27 ENCOUNTER — OFFICE VISIT (OUTPATIENT)
Dept: INTERNAL MEDICINE | Facility: CLINIC | Age: 82
End: 2022-04-27
Payer: MEDICARE

## 2022-04-27 VITALS
HEART RATE: 86 BPM | SYSTOLIC BLOOD PRESSURE: 122 MMHG | WEIGHT: 135.56 LBS | TEMPERATURE: 99 F | BODY MASS INDEX: 22.59 KG/M2 | RESPIRATION RATE: 16 BRPM | DIASTOLIC BLOOD PRESSURE: 64 MMHG | OXYGEN SATURATION: 96 % | HEIGHT: 65 IN

## 2022-04-27 DIAGNOSIS — F41.9 ANXIETY: ICD-10-CM

## 2022-04-27 DIAGNOSIS — F32.A DEPRESSION, UNSPECIFIED DEPRESSION TYPE: ICD-10-CM

## 2022-04-27 DIAGNOSIS — R00.2 PALPITATION: ICD-10-CM

## 2022-04-27 DIAGNOSIS — Z02.2 ENCOUNTER FOR EXAMINATION FOR ADMISSION TO NURSING HOME: Primary | ICD-10-CM

## 2022-04-27 DIAGNOSIS — I10 PRIMARY HYPERTENSION: ICD-10-CM

## 2022-04-27 DIAGNOSIS — E78.2 MIXED HYPERLIPIDEMIA: ICD-10-CM

## 2022-04-27 DIAGNOSIS — I48.0 PAF (PAROXYSMAL ATRIAL FIBRILLATION): ICD-10-CM

## 2022-04-27 DIAGNOSIS — K21.9 GASTROESOPHAGEAL REFLUX DISEASE, UNSPECIFIED WHETHER ESOPHAGITIS PRESENT: ICD-10-CM

## 2022-04-27 PROCEDURE — 99999 PR PBB SHADOW E&M-EST. PATIENT-LVL V: ICD-10-PCS | Mod: PBBFAC,,, | Performed by: PEDIATRICS

## 2022-04-27 PROCEDURE — 99213 PR OFFICE/OUTPT VISIT, EST, LEVL III, 20-29 MIN: ICD-10-PCS | Mod: S$PBB,,, | Performed by: PEDIATRICS

## 2022-04-27 PROCEDURE — 99999 PR PBB SHADOW E&M-EST. PATIENT-LVL V: CPT | Mod: PBBFAC,,, | Performed by: PEDIATRICS

## 2022-04-27 PROCEDURE — 99213 OFFICE O/P EST LOW 20 MIN: CPT | Mod: S$PBB,,, | Performed by: PEDIATRICS

## 2022-04-27 PROCEDURE — 99215 OFFICE O/P EST HI 40 MIN: CPT | Mod: PBBFAC | Performed by: PEDIATRICS

## 2022-04-27 NOTE — PROGRESS NOTES
Subjective:       Patient ID: Nerissa Burnham is a 82 y.o. female.    Chief Complaint: Letter for School/Work (Assisted living admit form)    Nerissa Burnham is a 82 y.o. female who presents to clinic for paperwork as she is moving into assisted living at [facility]. She has had a couple falls at home and sons made the decision to have her move.     Patient Active Problem List:     Hypertension/Afib     Hyperlipidemia     Depression     Osteopenia     HSV-2 infection     Anxiety     GERD (gastroesophageal reflux disease)     Diarrhea     Family history of factor V Leiden mutation     Nonrheumatic aortic valve insufficiency    Review of Systems   Constitutional: Negative for activity change, appetite change, chills, diaphoresis, fatigue, fever and unexpected weight change.   HENT: Negative for nasal congestion, ear pain, mouth sores, nosebleeds, postnasal drip, rhinorrhea, sneezing and sore throat.    Eyes: Negative for photophobia, pain, discharge, redness and visual disturbance.   Respiratory: Negative for cough, chest tightness, shortness of breath, wheezing and stridor.    Cardiovascular: Negative for chest pain, palpitations and leg swelling.   Gastrointestinal: Negative for constipation, diarrhea, nausea and vomiting.   Genitourinary: Negative for decreased urine volume, difficulty urinating, dysuria, flank pain, frequency, hematuria and urgency.   Musculoskeletal: Negative for arthralgias, back pain, joint swelling, neck pain and neck stiffness.   Integumentary:  Negative for color change and rash.   Neurological: Negative for dizziness, syncope, speech difficulty, weakness, light-headedness and headaches.   Hematological: Negative for adenopathy. Does not bruise/bleed easily.   Psychiatric/Behavioral: Negative for confusion, decreased concentration, dysphoric mood, hallucinations, sleep disturbance and suicidal ideas. The patient is not nervous/anxious.    All other systems reviewed and are negative.         Objective:      Physical Exam  Vitals and nursing note reviewed.   Constitutional:       General: She is not in acute distress.     Appearance: She is well-developed.   Neck:      Thyroid: No thyromegaly.      Vascular: No JVD.   Cardiovascular:      Rate and Rhythm: Normal rate and regular rhythm.      Heart sounds: Normal heart sounds. No murmur heard.  Pulmonary:      Effort: Pulmonary effort is normal. No respiratory distress.      Breath sounds: Normal breath sounds. No wheezing or rales.   Abdominal:      General: There is no distension.      Palpations: Abdomen is soft. There is no mass.      Tenderness: There is no abdominal tenderness. There is no guarding.   Musculoskeletal:      Right lower leg: No edema.      Left lower leg: No edema.   Lymphadenopathy:      Cervical: No cervical adenopathy.   Skin:     Capillary Refill: Capillary refill takes less than 2 seconds.      Findings: No rash.   Neurological:      General: No focal deficit present.      Mental Status: She is alert and oriented to person, place, and time.      Cranial Nerves: No cranial nerve deficit.      Coordination: Coordination normal.   Psychiatric:         Mood and Affect: Mood normal.         Behavior: Behavior normal.         Thought Content: Thought content normal.         Judgment: Judgment normal.         Assessment:       Problem List Items Addressed This Visit     Hypertension    Hyperlipidemia    Depression    Anxiety    GERD (gastroesophageal reflux disease)    Palpitation    PAF (paroxysmal atrial fibrillation)      Other Visit Diagnoses     Encounter for examination for admission to [facility]    -  Primary          Plan:     Encounter for examination for admission to nursing home    Anxiety    Depression, unspecified depression type    Gastroesophageal reflux disease, unspecified whether esophagitis present    Mixed hyperlipidemia    Primary hypertension    PAF (paroxysmal atrial fibrillation)    Palpitation    forms for  independent living at [facility] filled out.   Scribe Attestation:   I, Sher Rico, am scribing for, and in the presence of, Dr. Bruno Lopes Jr. I performed the above scribed service and the documentation accurately describes the services I performed. I attest to the accuracy of the note.    I, Dr. Bruno Lopes Jr, reviewed documentation as scribed above. I personally performed the services described in this documentation.  I agree that the record reflects my personal performance and is accurate and complete. Bruno Lopes Jr., MD.  04/27/2022

## 2022-05-21 DIAGNOSIS — R21 RASH: ICD-10-CM

## 2022-05-21 NOTE — TELEPHONE ENCOUNTER
No new care gaps identified.  [facility] Embedded Care Gaps. Reference number: [id number]. 5/21/2022   11:47:47 AM [email or web address]

## 2022-05-23 RX ORDER — TRIAMCINOLONE ACETONIDE 1 MG/G
CREAM TOPICAL
Qty: 45 G | Refills: 0 | Status: SHIPPED | OUTPATIENT
Start: 2022-05-23 | End: 2022-06-14

## 2022-05-23 NOTE — TELEPHONE ENCOUNTER
Refill Authorization Note   Nerissa Burnham  is requesting a refill authorization.  Brief Assessment and Rationale for Refill:  Approve     Medication Therapy Plan:       Medication Reconciliation Completed: No   Comments:     No Care Gaps recommended.     Note composed:9:32 AM 05/23/2022

## 2022-06-13 ENCOUNTER — TELEPHONE (OUTPATIENT)
Dept: INTERNAL MEDICINE | Facility: CLINIC | Age: 82
End: 2022-06-13
Payer: MEDICARE

## 2022-06-13 NOTE — TELEPHONE ENCOUNTER
----- Message from Rachel Pickens sent at 6/13/2022 12:42 PM CDT -----  Contact: Nerissa  Nerissa stated the medication for the pain in her left leg isn't helping and would like to be referred to a specialist. She isn't sure which specialist it would be. Please call her back at [phone]

## 2022-06-14 ENCOUNTER — OFFICE VISIT (OUTPATIENT)
Dept: INTERNAL MEDICINE | Facility: CLINIC | Age: 82
End: 2022-06-14
Payer: MEDICARE

## 2022-06-14 VITALS
HEIGHT: 65 IN | SYSTOLIC BLOOD PRESSURE: 106 MMHG | DIASTOLIC BLOOD PRESSURE: 64 MMHG | WEIGHT: 133.63 LBS | TEMPERATURE: 99 F | BODY MASS INDEX: 22.26 KG/M2 | HEART RATE: 70 BPM | OXYGEN SATURATION: 96 %

## 2022-06-14 DIAGNOSIS — M54.16 LUMBAR RADICULOPATHY: Primary | ICD-10-CM

## 2022-06-14 PROCEDURE — 99214 OFFICE O/P EST MOD 30 MIN: CPT | Mod: S$PBB,,, | Performed by: FAMILY MEDICINE

## 2022-06-14 PROCEDURE — 99214 PR OFFICE/OUTPT VISIT, EST, LEVL IV, 30-39 MIN: ICD-10-PCS | Mod: S$PBB,,, | Performed by: FAMILY MEDICINE

## 2022-06-14 PROCEDURE — 99215 OFFICE O/P EST HI 40 MIN: CPT | Mod: PBBFAC | Performed by: FAMILY MEDICINE

## 2022-06-14 PROCEDURE — 99999 PR PBB SHADOW E&M-EST. PATIENT-LVL V: CPT | Mod: PBBFAC,,, | Performed by: FAMILY MEDICINE

## 2022-06-14 PROCEDURE — 99999 PR PBB SHADOW E&M-EST. PATIENT-LVL V: ICD-10-PCS | Mod: PBBFAC,,, | Performed by: FAMILY MEDICINE

## 2022-06-14 RX ORDER — GABAPENTIN 400 MG/1
400 CAPSULE ORAL NIGHTLY
Qty: 90 CAPSULE | Refills: 0 | Status: SHIPPED | OUTPATIENT
Start: 2022-06-14 | End: 2022-07-07

## 2022-06-14 NOTE — PROGRESS NOTES
Subjective:   Patient ID: Nerissa Burnham is a 82 y.o. female.  Chief Complaint:  Leg Pain    Presents with left leg pain  Sharp and shooting, begins in left hip and radiates down her leg  Began Sunday  Had previously experienced similar pain in October 2021  Pain is worse this time than last, 9/10  Worse at rest   Needs to use cane while walking, because pain can cause her L knee to buckle    Lumbar sacral x-ray done March 2022 showed:  There is mild dextrocurvature of the lumbar spine.  Grade 1 anterolisthesis of L3 on L4.  No fracture or pars defect identified.  Moderate to severe degenerative disc disease involving the L4-L5 and L5-S1 levels.  Mild disc height loss at the L3-L4 level.  Multilevel inferior lumbar spine predominant facet arthropathy.  Sacroiliac joints appear normal.  No osseous erosion or suspicious osseous lesion.  Open to trying gabapentin and physical therapy if indicated    Hip x-ray done March 2022 showed:  There is no evidence to suggest acute fracture or dislocation.  The hip joint space is relatively well preserved.  No plain film evidence to suggest AVN.      Current Outpatient Medications:     ammonium lactate 12 % Crea, Apply 1 Act topically 2 (two) times daily., Disp: 1 Tube, Rfl: 3    ascorbic acid, vitamin C, (VITAMIN C) 1000 MG tablet, Take 1,000 mg by mouth once daily., Disp: , Rfl:     atorvastatin (LIPITOR) 10 MG tablet, Take 1 tablet (10 mg total) by mouth once daily., Disp: 90 tablet, Rfl: 2    b complex vitamins capsule, Take 1 capsule by mouth once daily., Disp: , Rfl:     calcium carbonate (CALCIUM 600 ORAL), Take 1,200 mg by mouth once daily., Disp: , Rfl:     cholecalciferol, vitamin D3, (VITAMIN D3) 25 mcg (1,000 unit) capsule, Take 1,000 Units by mouth once daily., Disp: , Rfl:     EScitalopram oxalate (LEXAPRO) 20 MG tablet, Take 1 tablet (20 mg total) by mouth once daily., Disp: 90 tablet, Rfl: 3    metoprolol succinate (TOPROL-XL) 25 MG 24 hr tablet, TAKE 1  "TABLET BY MOUTH EVERY DAY, Disp: 90 tablet, Rfl: 3    ramipriL (ALTACE) 2.5 MG capsule, Take 1 capsule (2.5 mg total) by mouth once daily., Disp: 90 capsule, Rfl: 3    valACYclovir (VALTREX) 500 MG tablet, TAKE 1 TABLET BY MOUTH EVERY DAY, Disp: 90 tablet, Rfl: 1    gabapentin (NEURONTIN) 400 MG capsule, Take 1 capsule (400 mg total) by mouth every evening., Disp: 90 capsule, Rfl: 0    XARELTO 20 mg Tab, TAKE 1 TABLET (20 MG TOTAL) BY MOUTH DAILY WITH DINNER OR EVENING MEAL., Disp: 90 tablet, Rfl: 3    Review of Systems   Constitutional: Negative for chills, fatigue and fever.   HENT: Negative for ear pain and sore throat.    Respiratory: Negative for cough.    Cardiovascular: Negative for chest pain, palpitations and leg swelling.   Gastrointestinal: Negative for abdominal pain, blood in stool, constipation, diarrhea, nausea and vomiting.   Genitourinary: Negative for decreased urine volume, difficulty urinating, dysuria, flank pain, frequency, hematuria and urgency.   Musculoskeletal: Positive for arthralgias, back pain, gait problem and myalgias. Negative for joint swelling, neck pain and neck stiffness.   Skin: Negative for rash.   Neurological: Positive for numbness. Negative for dizziness, tremors, seizures, syncope, weakness, light-headedness and headaches.   Psychiatric/Behavioral: Negative for sleep disturbance. The patient is not nervous/anxious.      Objective:   /64   Pulse 70   Temp 98.6 °F (37 °C)   Ht 5' 5" (1.651 m)   Wt 60.6 kg (133 lb 9.6 oz)   SpO2 96%   BMI 22.23 kg/m²     Physical Exam  Vitals and nursing note reviewed.   Constitutional:       Appearance: Normal appearance. She is well-developed and normal weight.   Cardiovascular:      Rate and Rhythm: Normal rate and regular rhythm.   Pulmonary:      Effort: Pulmonary effort is normal.   Musculoskeletal:      Lumbar back: Positive left straight leg raise test.      Left hip: Normal. No bony tenderness. Normal range of motion. "      Right lower leg: No edema.      Left lower leg: No edema.   Skin:     General: Skin is warm and dry.      Findings: No rash.   Neurological:      Mental Status: She is alert.      Gait: Gait abnormal.   Psychiatric:         Attention and Perception: Attention normal.         Mood and Affect: Mood and affect normal.       Assessment:       ICD-10-CM ICD-9-CM   1. Lumbar radiculopathy  M54.16 724.4     Plan:   Lumbar radiculopathy  -     gabapentin (NEURONTIN) 400 MG capsule; Take 1 capsule (400 mg total) by mouth every evening.  Dispense: 90 capsule; Refill: 0  -     Ambulatory referral/consult to Physical/Occupational Therapy; Future; Expected date: 06/21/2022    Patient education on lumbar radiculopathy and sciatica likely based on her chronic degenerative disease  Gabapentin 400 mg nightly to help with neuropathic pain  Recommend physical therapy  Since not being admitted to [facility] until August of 2022, request therapy be done at the [place]  Referral ordered   Follow-up with Dr. Gudino in 4-6 weeks to assess effectiveness of therapy and medication    Merari Mackay, MS4    I hereby acknowledge that I am relying upon documentation authored by a medical student working under my supervision and further I hereby attest that I have verified the student documentation or findings by personally performing the physical exam and medical decision making activities of the Evaluation and Management service to be billed.  Artie Washington

## 2022-06-20 ENCOUNTER — TELEPHONE (OUTPATIENT)
Dept: INTERNAL MEDICINE | Facility: CLINIC | Age: 82
End: 2022-06-20
Payer: MEDICARE

## 2022-06-20 NOTE — TELEPHONE ENCOUNTER
----- Message from Tariq Quigley sent at 6/20/2022  3:48 PM CDT -----  Contact: [phone]  Patient would like to consult with a nurse in regards to her current leg pains. Please call back at [phone]. Thanks r/s

## 2022-06-23 ENCOUNTER — CLINICAL SUPPORT (OUTPATIENT)
Dept: REHABILITATION | Facility: HOSPITAL | Age: 82
End: 2022-06-23
Payer: MEDICARE

## 2022-06-23 DIAGNOSIS — M54.42 CHRONIC BILATERAL LOW BACK PAIN WITH LEFT-SIDED SCIATICA: ICD-10-CM

## 2022-06-23 DIAGNOSIS — G89.29 CHRONIC BILATERAL LOW BACK PAIN WITH LEFT-SIDED SCIATICA: ICD-10-CM

## 2022-06-23 DIAGNOSIS — M54.16 LUMBAR RADICULOPATHY: ICD-10-CM

## 2022-06-23 PROCEDURE — 97162 PT EVAL MOD COMPLEX 30 MIN: CPT

## 2022-06-23 PROCEDURE — 97110 THERAPEUTIC EXERCISES: CPT

## 2022-06-23 NOTE — PLAN OF CARE
[facility]  Physical Therapy Initial Evaluation     Date: 6/23/2022   Name: Nerissa Burnham  [facility] Number: [id number]    Therapy Diagnosis:   Encounter Diagnoses   Name Primary?    Lumbar radiculopathy     Chronic bilateral low back pain with left-sided sciatica      Physician: Artie Washington MD    Physician Orders: PT Eval and Treat  Medical Diagnosis from Referral: M54.16 (ICD-10-CM) - Lumbar radiculopathy  Evaluation Date: 6/23/2022  Authorization Period Expiration: 6/14/2023  Plan of Care Expiration: 8/4/2022  Progress Note Due: 7/23/2022  Visit # / Visits authorized: 1/1   FOTO: 1/3     Time In: 10:45am  Time Out: 11:30am  Total Appointment Time (timed & untimed codes): 45 minutes    Precautions: Standard and Fall    SUBJECTIVE   Date of onset: ~22 days ago  History of current condition - Nerissa reports: She started having burning down her left thigh and now some into her knee. She states she had it back in October but it didn't last long and eventually to the point it just stopped. She states this time it is worse and it has last almost a month. She states she was given some medicine but she doesn't think it has helped. She states the pain is pretty constant but if she is still it does decrease. She states she is having a hard time finding a good position to sleep in also. She states she feels very limited with walking and that is her biggest problem. She states she really likes to pain but the pain has been stopping her from being able to sit for long periods to do it.     Imaging, x-ray: Hip- There is no evidence to suggest acute fracture or dislocation.  The hip joint space is relatively well preserved.  No plain film evidence to suggest AVN.  Back- There is mild dextrocurvature of the lumbar spine.  Grade 1 anterolisthesis of L3 on L4.  No fracture or pars defect identified.  Moderate to severe degenerative disc disease involving the L4-L5 and L5-S1 levels.  Mild disc height  loss at the L3-L4 level.  Multilevel inferior lumbar spine predominant facet arthropathy.  Sacroiliac joints appear normal.  No osseous erosion or suspicious osseous lesion.    Prior Therapy: none  Social History: lives alone, is moving to [facility] in August   Occupation: N/A  Prior Level of Function: walking, art work  Current Level of Function: light walking    Pain:  Current 7/10, worst 10/10, best 6/10   Location: bilateral back and left hip   Description: Burning  Aggravating Factors: Sitting, Standing and Walking  Easing Factors: nothing    Pts goals: walk longer distances, and sit for longer periods to be able to pain      Medical History:   Past Medical History:   Diagnosis Date    Depression     Encounter for blood transfusion     Hyperlipidemia     Hypertension        Surgical History:   Nerissa Burnham  has a past surgical history that includes Hysterectomy; Oophorectomy; Breast surgery (Bilateral); Augmentation of breast; and Colonoscopy (N/A, 1/10/2020).    Medications:   Nerissa has a current medication list which includes the following prescription(s): ammonium lactate, ascorbic acid (vitamin c), atorvastatin, b complex vitamins, calcium carbonate, cholecalciferol (vitamin d3), escitalopram oxalate, gabapentin, metoprolol succinate, ramipril, valacyclovir, and xarelto.    Allergies:   Review of patient's allergies indicates:  No Known Allergies     OBJECTIVE     Sensation:  Sensation is intact to light touch    (WFL: Within Functional Limits)     ROM   Right (degrees) Left (degrees)   Lumbar Flexion  75% 75%   Lumbar Extension 50% 50% pain   Lumbar Sidebending 100% 100% fear   Lumbar Rotation 50% 50% pain     Hip Flexion (125) WFL WFL   Hip Extension (15) WFL WFL   Hip Internal Rotation (45) WFL WFL   Hip External Rotation (45) WFL WFL   Hip Abduction (45) WFL WFL     Joint Mobility   Right Left    Lumbar PA Glide Normal Normal   Lumbar Rotation Normal Normal     Strength   Right     Left   Gluteus Sanjeev 4/5 4/5   Gluteus Medius 3+/5 3+/5   Hip Adductors 4/5 4/5   Psoas 4-/5 4-/5   Quadriceps 4/5 4/5   Hamstrings 4/5 4/5   Anterior Tibialis 4+/5 4+/5   Gastroc/Soleus 4+/5 4+/5   Transverse Abdominis 3/5 3/5     Special Tests:   Test Right Left   SLR Test negative positive   SLUMP  negative negative   ASIS Compression negative negative   ASIS Distraction negative negative   Posterior Shear Gap negative negative     Muscle Length: normal hamstring length    Palpation: tender at left piriformis      Movement Analysis:   Test Right Left   Sit to Stand 30 secs 10 reps with hand support    Single Leg Balance Unstable  Unstable with pain     Gait Analysis: The patient ambulated with the use of straight cane and presents with the following gait abnormalities: decreased stance time on left and decreased push off on left    Function:     CMS Impairment/Limitation/Restriction for FOTO Lumbar Spine Survey    Therapist reviewed FOTO scores for Nerissa Burnham on 6/23/2022.   FOTO documents entered into EPIC - see Media section.    Limitation Score: 54%       TREATMENT     Nerissa received therapeutic exercises to develop strength, endurance, ROM, flexibility, posture and core stabilization for 10 minutes including:  HEP demonstrations and education     Nerissa received the following manual therapy techniques: Joint mobilizations, Myofacial release and Soft tissue Mobilization were applied to the: lumbar for 0 minutes, including:    Nerissa participated in neuromuscular re-education activities to improve: Balance, Coordination, Sense and Posture for 0 minutes. The following activities were included:    Nerissa participated in dynamic functional therapeutic activities to improve functional performance for 0 minutes, including:    PATIENT EDUCATION AND HOME EXERCISES     Education provided:   - bridges, sidelying clams, straight leg raises     Written Home Exercises Provided: yes.  Exercises were reviewed and Nerissa was able  to demonstrate them prior to the end of the session.  Nerissa demonstrated good  understanding of the education provided.     See EMR under Patient Instructions for exercises provided 6/23/2022.    ASSESSMENT   Nerissa is a 82 y.o. female referred to outpatient Physical Therapy with a medical diagnosis of M54.16 (ICD-10-CM) - Lumbar radiculopathy. The patient presents with impairments which include decreased ROM, decreased strength, impaired coordination, impaired balance, gait abnormalities and decreased overall function.  These impairments are limiting patient's ability to stand for long periods, sit for long periods, and walking long distances. Pt prognosis is Good due to personal factors and co-morbidities listed below. Pt will benefit from skilled outpatient Physical Therapy to address the deficits stated above and in the chart below, provide pt/family education, and to maximize pt's level of independence.     Plan of care discussed with patient: Yes  Pt's spiritual, cultural and educational needs considered and patient is agreeable to the plan of care and goals as stated below:     Anticipated Barriers for therapy: co-morbidities     Medical Necessity is demonstrated by the following  History  Co-morbidities and personal factors that may impact the plan of care Co-morbidities:   advanced age, anxiety, coping style/mechanism, depression, HTN and level of undertstanding of current condition    Personal Factors:   age  coping style  lifestyle     high   Examination  Body Structures and Functions, activity limitations and participation restrictions that may impact the plan of care Body Regions:   back  lower extremities    Body Systems:    ROM  strength  gross coordinated movement  balance  gait  transfers  blood pressure    Participation Restrictions:   Painting     Activity limitations:   Learning and applying knowledge  no deficits    General Tasks and Commands  no deficits    Communication  no  deficits    Mobility  lifting and carrying objects  walking    Self care  dressing    Domestic Life  shopping  cooking  doing house work (cleaning house, washing dishes, laundry)    Interactions/Relationships  no deficits    Life Areas  no deficits    Community and Social Life  community life  recreation and leisure         moderate   Clinical Presentation evolving clinical presentation with changing clinical characteristics moderate   Decision Making/ Complexity Score: moderate     Goals:  Short Term Goals: 3 weeks   1. Recent signs and systems trend is improving in order to progress towards LTG's.  2. Patient will improve lumbar flexion to 100% in order to reach to floor  3. Patient will be independent with HEP in order to further progress and return to maximal function.  4. Pain rating at Worst: 4/10 in order to progress towards increased independence with activity.    Long Term Goals: 6 weeks   1. Patient will improve glute med strength to 4-/5 in order to stand for long periods.  2. Patient will improve psoas strength to 4/5 in order to go up and down stairs.  3. Patient will improve lumbar extension to 100% in order to sleep at night.   4. Patient will demonstrate normal gait mechanics in order to minimize any compensation and return to PLOF.   5. Patient will self report improvement to 32% limitation on the FOTO Low Back Survey.     PLAN   Plan of care Certification: 6/23/2022 to 8/4/2022.    Outpatient Physical Therapy 2 times weekly for 6 weeks to include the following interventions: Gait Training, Manual Therapy, Moist Heat/ Ice, Neuromuscular Re-ed, Patient Education, Self Care, Therapeutic Activities and Therapeutic Exercise.     Artie Tiwari, PT, DPT    I CERTIFY THE NEED FOR THESE SERVICES FURNISHED UNDER THIS PLAN OF TREATMENT AND WHILE UNDER MY CARE   Physician's comments:     Physician's Signature: ___________________________________________________

## 2022-06-27 ENCOUNTER — TELEPHONE (OUTPATIENT)
Dept: SPORTS MEDICINE | Facility: CLINIC | Age: 82
End: 2022-06-27
Payer: MEDICARE

## 2022-06-27 NOTE — TELEPHONE ENCOUNTER
After speaking with patient and finding out it was her hip I let her know that unfortunately she was scheduled incorrectly. Pt states understanding and appreciation.

## 2022-06-28 ENCOUNTER — DOCUMENTATION ONLY (OUTPATIENT)
Dept: REHABILITATION | Facility: HOSPITAL | Age: 82
End: 2022-06-28

## 2022-06-28 ENCOUNTER — CLINICAL SUPPORT (OUTPATIENT)
Dept: REHABILITATION | Facility: HOSPITAL | Age: 82
End: 2022-06-28
Payer: MEDICARE

## 2022-06-28 DIAGNOSIS — G89.29 CHRONIC BILATERAL LOW BACK PAIN WITH LEFT-SIDED SCIATICA: Primary | ICD-10-CM

## 2022-06-28 DIAGNOSIS — M54.42 CHRONIC BILATERAL LOW BACK PAIN WITH LEFT-SIDED SCIATICA: Primary | ICD-10-CM

## 2022-06-28 PROCEDURE — 97140 MANUAL THERAPY 1/> REGIONS: CPT | Mod: CQ

## 2022-06-28 PROCEDURE — 97110 THERAPEUTIC EXERCISES: CPT | Mod: CQ

## 2022-06-28 NOTE — PROGRESS NOTES
PT/PTA met face to face to discuss pt's treatment plan and progress towards established goals. Pt will be seen by a physical therapist minimally every 6th visit or every 30 days.        Nayan Boucher PTA

## 2022-06-28 NOTE — PROGRESS NOTES
[facility]   Physical Therapy Treatment Note     Name: Nerissa Burnham  Clinic Number: [id number]    Therapy Diagnosis:   Encounter Diagnosis   Name Primary?    Chronic bilateral low back pain with left-sided sciatica Yes     Physician: Artie Washington MD    Visit Date: 6/28/2022      Physician Orders: PT Eval and Treat  Medical Diagnosis from Referral: M54.16 (ICD-10-CM) - Lumbar radiculopathy  Evaluation Date: 6/23/2022  Authorization Period Expiration: 6/14/2023  Plan of Care Expiration: 8/4/2022  Progress Note Due: 7/23/2022  Visit # / Visits authorized: 1/20 (+ 1 evaluation)  FOTO: 1/3      Time In: 1330  Time Out: 1415  Total Appointment Time:  40 minutes     Precautions: Standard and Fall    PTA Visit #: 1/5     SUBJECTIVE     Patient reports: she has been doing her home exercise program and feels that they are making her worse. Less pain with sitting compared to walking. Using a heating pad to assist with pain management.     She was compliant with home exercise program 4 times since her evaluation.     Response to previous treatment: no change after her evaluation    Functional change: decreased sleep, not walking well, home chores are difficult, unable to cook at this time due to decreased standing tolerance. Ambulating with straight cane today compared to no assistive device on evaluation.      Pain: 9/10     Location: left lower lower extremity and into her hip    OBJECTIVE     Objective Measures updated at progress report unless specified.       TREATMENT     Nerissa received the treatments listed below:       MANUAL THERAPY TECHNIQUES were applied for (10) minutes, including:    Manual Intervention Performed Today    Soft Tissue Mobilization x left glutes and piriformis with LaCrosse ball in right side lying   Joint Mobilizations     Mobilization with movement          Functional Dry Needling        Plan for Next Visit: PRN       THERAPEUTIC EXERCISES to develop strength,  endurance, ROM, flexibility, posture, and core stabilization for (30) minutes including:    Intervention Performed Today    Exercise bike x 5 minutes   Bridges with abdominal brace  x X 10   clams x  2 x 10 Yellow band   Hip abduction hook lying with band X  1 minute x 3    Hip abduction with ball  x 2 x 1 minute   Straight leg raise      Piriformis stretch x 3 x 30 seconds          Plan for Next Visit:        PATIENT EDUCATION AND HOME EXERCISES     Home Exercises Provided and Patient Education Provided     Education provided: (durng session) minutes   PURPOSE: Patient educated on the impairments noted above and the effects of physical therapy intervention to improve overall condition and QOL.    EXERCISE: Patient was educated on all the above exercise prior/during/after for proper posture, positioning, and execution for safe performance with home exercise program and to perform all exercises with a slow controlled technique.    STRENGTH: Patient educated on the importance of improved core and extremity strength in order to improve alignment of the spine and extremities with static positions and dynamic movement.    6/28/2022: safe use of heating pad regarding duration, intensity, and frequency. To use cane in her right hand as opposed to her left hand.     Written Home Exercises Provided: yes.  Exercises were reviewed and Nerissa was able to demonstrate them prior to the end of the session.  Nerissa demonstrated good  understanding of the education provided. See EMR under Patient Instructions for exercises provided during therapy sessions.      ASSESSMENT     patient required cues for slow controlled movements with all exercises. She appeared to understand the instructions provided regarding heating pad use above. After 25 minutes of exercise, her pain decreased to 7/10. Patient required multiple rest breaks during hip abduction isometric exercise due to right cramping in her hamstring muscle. She demonstrated  minimal unsteadiness during gait after she was instructed to use her straight cane in her right hand.     Nerissa is progressing well towards her goals.   Pt prognosis is Good.     Pt will continue to benefit from skilled outpatient physical therapy to address the deficits listed in the problem list box on initial evaluation, provide pt/family education and to maximize pt's level of independence in the home and community environment.     Pt's spiritual, cultural and educational needs considered and pt agreeable to plan of care and goals.       Anticipated Barriers for therapy: co-morbidities       Goals:  Short Term Goals: 3 weeks   1. Recent signs and systems trend is improving in order to progress towards LTG's.  2. Patient will improve lumbar flexion to 100% in order to reach to floor  3. Patient will be independent with HEP in order to further progress and return to maximal function.  4. Pain rating at Worst: 4/10 in order to progress towards increased independence with activity.     Long Term Goals: 6 weeks   1. Patient will improve glute med strength to 4-/5 in order to stand for long periods.  2. Patient will improve psoas strength to 4/5 in order to go up and down stairs.  3. Patient will improve lumbar extension to 100% in order to sleep at night.   4. Patient will demonstrate normal gait mechanics in order to minimize any compensation and return to PLOF.   5. Patient will self report improvement to 32% limitation on the FOTO Low Back Survey.    Goals Key:  PC= progressing/continue; PM= partially met;        DC= discontinue    PLAN     Continue Plan of Care (POC) and progress per patient tolerance. See treatment section for details on planned progressions next session.      Nayan Boucher, PTA

## 2022-07-01 ENCOUNTER — CLINICAL SUPPORT (OUTPATIENT)
Dept: REHABILITATION | Facility: HOSPITAL | Age: 82
End: 2022-07-01
Payer: MEDICARE

## 2022-07-01 DIAGNOSIS — G89.29 CHRONIC BILATERAL LOW BACK PAIN WITH LEFT-SIDED SCIATICA: Primary | ICD-10-CM

## 2022-07-01 DIAGNOSIS — M54.42 CHRONIC BILATERAL LOW BACK PAIN WITH LEFT-SIDED SCIATICA: Primary | ICD-10-CM

## 2022-07-01 PROCEDURE — 97140 MANUAL THERAPY 1/> REGIONS: CPT | Mod: CQ

## 2022-07-01 PROCEDURE — 97110 THERAPEUTIC EXERCISES: CPT | Mod: CQ

## 2022-07-01 NOTE — PROGRESS NOTES
[facility]   Physical Therapy Treatment Note     Name: Nerissa Burnham  Clinic Number: [id number]    Therapy Diagnosis:   Encounter Diagnosis   Name Primary?    Chronic bilateral low back pain with left-sided sciatica Yes     Physician: Artie Washington MD    Visit Date: 7/1/2022      Physician Orders: PT Eval and Treat  Medical Diagnosis from Referral: M54.16 (ICD-10-CM) - Lumbar radiculopathy  Evaluation Date: 6/23/2022  Authorization Period Expiration: 6/14/2023  Plan of Care Expiration: 8/4/2022  Progress Note Due: 7/23/2022  Visit # / Visits authorized: 2/20 (+ 1 evaluation)  FOTO: 1/3      Time In: 1330  Time Out: 1415  Total Appointment Time:  39 minutes     Precautions: Standard and Fall    PTA Visit #: 2/5     SUBJECTIVE     Patient reports: she had less pain and did more chores resulting in more pain. Since then, her pain has decreased and has not used heating pad today.      She was compliant with home exercise program 4 times since her evaluation.     Response to previous treatment: felt good after last treatment    Functional change: decreased sleep, not walking well, home chores are difficult, unable to cook at this time due to decreased standing tolerance. Ambulating with straight cane today compared to no assistive device on evaluation.      Pain: 3/10     Location: left lower lower extremity and into her hip    OBJECTIVE     Objective Measures updated at progress report unless specified.       TREATMENT     Nerissa received the treatments listed below:       MANUAL THERAPY TECHNIQUES were applied for (8) minutes, including:    Manual Intervention Performed Today    Soft Tissue Mobilization x left glutes and piriformis with LaCrosse ball in right side lying   Joint Mobilizations     Mobilization with movement          Functional Dry Needling        Plan for Next Visit: PRN       THERAPEUTIC EXERCISES to develop strength, endurance, ROM, flexibility, posture, and core  stabilization for (31) minutes including:    Intervention Performed Today    Exercise bike x 5 minutes   Posterior pelvic tilt  x 3 minutes 5 second hold   Bridges with abdominal brace  x 2 X 10   clams x  3 x 10 Yellow band 1 second hold at end range of motion    Hip abduction hook lying with band x  1 minute x 3    Hip adduction with ball  x 3 minutes 5 second hold   Straight leg raise  x 2 x 10   Piriformis stretch  3 x 30 seconds          Plan for Next Visit:        PATIENT EDUCATION AND HOME EXERCISES     Home Exercises Provided and Patient Education Provided     Education provided: (durng session) minutes   PURPOSE: Patient educated on the impairments noted above and the effects of physical therapy intervention to improve overall condition and QOL.    EXERCISE: Patient was educated on all the above exercise prior/during/after for proper posture, positioning, and execution for safe performance with home exercise program and to perform all exercises with a slow controlled technique.    STRENGTH: Patient educated on the importance of improved core and extremity strength in order to improve alignment of the spine and extremities with static positions and dynamic movement.    6/28/2022: safe use of heating pad regarding duration, intensity, and frequency. To use cane in her right hand as opposed to her left hand.   7/1/2022: lowered height os straight cane for more appropriate fit     Written Home Exercises Provided: yes.  Exercises were reviewed and Nerissa was able to demonstrate them prior to the end of the session.  Nerissa demonstrated good  understanding of the education provided. See EMR under Patient Instructions for exercises provided during therapy sessions.      ASSESSMENT     patient required cues for slow controlled movements with all exercises. Patient required multiple rest breaks with short durations during this exercise session due to fatigue and experienced less right cramping in her hamstring  muscle as compared to last visit. She demonstrated less unsteadiness during gait. No trigger points noted with manual therapy.    Nerissa is progressing well towards her goals.   Pt prognosis is Good.     Pt will continue to benefit from skilled outpatient physical therapy to address the deficits listed in the problem list box on initial evaluation, provide pt/family education and to maximize pt's level of independence in the home and community environment.     Pt's spiritual, cultural and educational needs considered and pt agreeable to plan of care and goals.       Anticipated Barriers for therapy: co-morbidities       Goals:  Short Term Goals: 3 weeks   1. Recent signs and systems trend is improving in order to progress towards LTG's.  2. Patient will improve lumbar flexion to 100% in order to reach to floor  3. Patient will be independent with HEP in order to further progress and return to maximal function.  4. Pain rating at Worst: 4/10 in order to progress towards increased independence with activity.     Long Term Goals: 6 weeks   1. Patient will improve glute med strength to 4-/5 in order to stand for long periods.  2. Patient will improve psoas strength to 4/5 in order to go up and down stairs.  3. Patient will improve lumbar extension to 100% in order to sleep at night.   4. Patient will demonstrate normal gait mechanics in order to minimize any compensation and return to PLOF.   5. Patient will self report improvement to 32% limitation on the FOTO Low Back Survey.    Goals Key:  PC= progressing/continue; PM= partially met;        DC= discontinue    PLAN     Continue Plan of Care (POC) and progress per patient tolerance. See treatment section for details on planned progressions next session.      Nayan Boucher, PTA

## 2022-07-05 ENCOUNTER — CLINICAL SUPPORT (OUTPATIENT)
Dept: REHABILITATION | Facility: HOSPITAL | Age: 82
End: 2022-07-05
Payer: MEDICARE

## 2022-07-05 DIAGNOSIS — M54.42 CHRONIC BILATERAL LOW BACK PAIN WITH LEFT-SIDED SCIATICA: Primary | ICD-10-CM

## 2022-07-05 DIAGNOSIS — G89.29 CHRONIC BILATERAL LOW BACK PAIN WITH LEFT-SIDED SCIATICA: Primary | ICD-10-CM

## 2022-07-05 PROCEDURE — 97110 THERAPEUTIC EXERCISES: CPT | Mod: CQ

## 2022-07-05 NOTE — PROGRESS NOTES
[facility]   Physical Therapy Treatment Note     Name: Nerissa Burnham  Clinic Number: [id number]    Therapy Diagnosis:   Encounter Diagnosis   Name Primary?    Chronic bilateral low back pain with left-sided sciatica Yes     Physician: Artie Washington MD    Visit Date: 7/5/2022      Physician Orders: PT Eval and Treat  Medical Diagnosis from Referral: M54.16 (ICD-10-CM) - Lumbar radiculopathy  Evaluation Date: 6/23/2022  Authorization Period Expiration: 6/14/2023  Plan of Care Expiration: 8/4/2022  Progress Note Due: 7/23/2022  Visit # / Visits authorized: 3/20 (+ 1 evaluation)  FOTO: 1/3      Time In: 1115  Time Out: 1200  Total Appointment Time: 42 minutes     Precautions: Standard and Fall    PTA Visit #: 3/5     SUBJECTIVE     Patient reports: she has had no pain since she left her last therapy session and she has not felt like she ahs needed to walk with a cane.       She was compliant with home exercise program 4 times since her evaluation.     Response to previous treatment: felt good after last treatment    Functional change: decreased sleep, not walking well, home chores are difficult, unable to cook at this time due to decreased standing tolerance. Ambulating withno straight cane since her last therapy session.      Pain: 0/10     Location: left lower lower extremity and into her hip    OBJECTIVE     Objective Measures updated at progress report unless specified.       TREATMENT     Nerissa received the treatments listed below:       MANUAL THERAPY TECHNIQUES were applied for (0) minutes, including:    Manual Intervention Performed Today    Soft Tissue Mobilization  left glutes and piriformis with LaCrosse ball in right side lying   Joint Mobilizations     Mobilization with movement          Functional Dry Needling        Plan for Next Visit: PRN       THERAPEUTIC EXERCISES to develop strength, endurance, ROM, flexibility, posture, and core stabilization for (42) minutes  including:    Intervention Performed Today    Exercise bike x 5 minutes level 2    Posterior pelvic tilt   3 minutes 5 second hold   Bridges with abdominal brace  x 3 X 10 (increased)   clams x  3 x 10 Yellow band 1 second hold at end range of motion    Hip abduction hook lying with band   1 minute x 3    Hip adduction with ball   3 minutes 5 second hold   Straight leg raise with abdominal brace  x 3 x 8 bilaterally with cues for technique   Piriformis stretch x 3 x 30 seconds bialteral   Lower trunk rotation  X  3 minutes 5 second hold   Side to side steps x 3 minutes   payloff press     Sit to stand x 3 x 8 tan mat sitting on airex pad      Plan for Next Visit:        PATIENT EDUCATION AND HOME EXERCISES     Home Exercises Provided and Patient Education Provided     Education provided: (durng session) minutes   PURPOSE: Patient educated on the impairments noted above and the effects of physical therapy intervention to improve overall condition and QOL.    EXERCISE: Patient was educated on all the above exercise prior/during/after for proper posture, positioning, and execution for safe performance with home exercise program and to perform all exercises with a slow controlled technique.    STRENGTH: Patient educated on the importance of improved core and extremity strength in order to improve alignment of the spine and extremities with static positions and dynamic movement.    6/28/2022: safe use of heating pad regarding duration, intensity, and frequency. To use cane in her right hand as opposed to her left hand.   7/1/2022: lowered height os straight cane for more appropriate fit     Written Home Exercises Provided: yes.  Exercises were reviewed and Nerissa was able to demonstrate them prior to the end of the session.  Nerissa demonstrated good  understanding of the education provided. See EMR under Patient Instructions for exercises provided during therapy sessions.      ASSESSMENT     patient required less cues  for slow controlled movements with exercises today. She continued to require cues for maintaining abdominal brace with straight leg raise. She continues to experienced less right hamstring muscle cramping as compared to last visit. She was able to be progressed to standing exercises without complaints.     Nerissa is progressing well towards her goals.   Pt prognosis is Good.     Pt will continue to benefit from skilled outpatient physical therapy to address the deficits listed in the problem list box on initial evaluation, provide pt/family education and to maximize pt's level of independence in the home and community environment.     Pt's spiritual, cultural and educational needs considered and pt agreeable to plan of care and goals.       Anticipated Barriers for therapy: co-morbidities       Goals:  Short Term Goals: 3 weeks   1. Recent signs and systems trend is improving in order to progress towards LTG's.  2. Patient will improve lumbar flexion to 100% in order to reach to floor  3. Patient will be independent with HEP in order to further progress and return to maximal function.  4. Pain rating at Worst: 4/10 in order to progress towards increased independence with activity.     Long Term Goals: 6 weeks   1. Patient will improve glute med strength to 4-/5 in order to stand for long periods.  2. Patient will improve psoas strength to 4/5 in order to go up and down stairs.  3. Patient will improve lumbar extension to 100% in order to sleep at night.   4. Patient will demonstrate normal gait mechanics in order to minimize any compensation and return to PLOF.   5. Patient will self report improvement to 32% limitation on the FOTO Low Back Survey.    Goals Key:  PC= progressing/continue; PM= partially met;        DC= discontinue    PLAN     Continue Plan of Care (POC) and progress per patient tolerance. See treatment section for details on planned progressions next session.      Nayan Boucher, PTA

## 2022-07-08 ENCOUNTER — CLINICAL SUPPORT (OUTPATIENT)
Dept: REHABILITATION | Facility: HOSPITAL | Age: 82
End: 2022-07-08
Payer: MEDICARE

## 2022-07-08 DIAGNOSIS — G89.29 CHRONIC BILATERAL LOW BACK PAIN WITH LEFT-SIDED SCIATICA: Primary | ICD-10-CM

## 2022-07-08 DIAGNOSIS — M54.42 CHRONIC BILATERAL LOW BACK PAIN WITH LEFT-SIDED SCIATICA: Primary | ICD-10-CM

## 2022-07-08 PROCEDURE — 97110 THERAPEUTIC EXERCISES: CPT | Mod: CQ

## 2022-07-08 PROCEDURE — 97112 NEUROMUSCULAR REEDUCATION: CPT | Mod: CQ

## 2022-07-08 NOTE — PROGRESS NOTES
[facility]   Physical Therapy Treatment Note     Name: Nerissa Burnham  Clinic Number: [id number]    Therapy Diagnosis:   Encounter Diagnosis   Name Primary?    Chronic bilateral low back pain with left-sided sciatica Yes     Physician: Artie Washington MD    Visit Date: 7/8/2022      Physician Orders: PT Eval and Treat  Medical Diagnosis from Referral: M54.16 (ICD-10-CM) - Lumbar radiculopathy  Evaluation Date: 6/23/2022  Authorization Period Expiration: 6/14/2023  Plan of Care Expiration: 8/4/2022  Progress Note Due: 7/23/2022  Visit # / Visits authorized: 4/20 (+ 1 evaluation)  FOTO: 1/3      Time In: 1045  Time Out: 1130  Total Appointment Time: 41 minutes     Precautions: Standard and Fall    PTA Visit #: 4/5     SUBJECTIVE     Patient reports: she has had no pain for the last few therapy session and she is thinking about discharge in the near future.        She was compliant with home exercise program 4 times since her evaluation.     Response to previous treatment: felt good after last treatment    Functional change: Ambulating with no straight cane since her last therapy session.      Pain: 0/10     Location: left lower lower extremity and into her hip    OBJECTIVE     Objective Measures updated at progress report unless specified.       TREATMENT     Nerissa received the treatments listed below:       MANUAL THERAPY TECHNIQUES were applied for (0) minutes, including:    Manual Intervention Performed Today    Soft Tissue Mobilization  left glutes and piriformis with LaCrosse ball in right side lying   Joint Mobilizations     Mobilization with movement          Functional Dry Needling        Plan for Next Visit: PRN       THERAPEUTIC EXERCISES to develop strength, endurance, ROM, flexibility, posture, and core stabilization for (23) minutes including:    Intervention Performed Today    Exercise bike x 5 minutes level 2    Posterior pelvic tilt   3 minutes 5 second hold   Bridges  with abdominal brace   3 X 10 (increased)   clams  x 3 x 10 Yellow band 1 second hold at end range of motion    Hip abduction hook lying with band   1 minute x 3    Hip adduction with ball   3 minutes 5 second hold   Straight leg raise with abdominal brace   3 x 8 bilaterally with cues for technique   Piriformis stretch x 3 x 30 seconds bilateral    Lower trunk rotation    3 minutes 5 second hold   Side to side steps  3 minutes   payloff press     Sit to stand x 3 x 8 tan mat red band at knees     Plan for Next Visit:        NEUROMUSCULAR RE-EDUCATION ACTIVITIES to improve Balance, Coordination and Posture for (18) minutes.  The following were included:    Intervention Performed Today    Side to side steps x 3 minutes   Step ups x X 30 bilateral 4 inch box no hands cues to slow reps   March in place x 3 x 10 no hands cues to slow reps   Heel raises x 3 x 10 no hands cues to slow reps   Toe raises                      Plan for Next Visit:        PATIENT EDUCATION AND HOME EXERCISES     Home Exercises Provided and Patient Education Provided     Education provided: (durng session) minutes   PURPOSE: Patient educated on the impairments noted above and the effects of physical therapy intervention to improve overall condition and QOL.    EXERCISE: Patient was educated on all the above exercise prior/during/after for proper posture, positioning, and execution for safe performance with home exercise program and to perform all exercises with a slow controlled technique.    STRENGTH: Patient educated on the importance of improved core and extremity strength in order to improve alignment of the spine and extremities with static positions and dynamic movement.    6/28/2022: safe use of heating pad regarding duration, intensity, and frequency. To use cane in her right hand as opposed to her left hand.   7/1/2022: lowered height os straight cane for more appropriate fit     Written Home Exercises Provided: yes.  Exercises  were reviewed and Nerissa was able to demonstrate them prior to the end of the session.  Nerissa demonstrated good  understanding of the education provided. See EMR under Patient Instructions for exercises provided during therapy sessions.      ASSESSMENT     patient required less cues for slow controlled movements with exercises today. She continued to require cues for maintaining abdominal brace with straight leg raise. She continues to experienced less right hamstring muscle cramping as compared to last visit. She demonstrated minimal loss of balance with step ups and with heel raises but was able to maintain her balance without additional assistance.       Nerissa is progressing well towards her goals.   Pt prognosis is Good.     Pt will continue to benefit from skilled outpatient physical therapy to address the deficits listed in the problem list box on initial evaluation, provide pt/family education and to maximize pt's level of independence in the home and community environment.     Pt's spiritual, cultural and educational needs considered and pt agreeable to plan of care and goals.       Anticipated Barriers for therapy: co-morbidities       Goals:  Short Term Goals: 3 weeks   1. Recent signs and systems trend is improving in order to progress towards LTG's.  2. Patient will improve lumbar flexion to 100% in order to reach to floor  3. Patient will be independent with HEP in order to further progress and return to maximal function.  4. Pain rating at Worst: 4/10 in order to progress towards increased independence with activity.     Long Term Goals: 6 weeks   1. Patient will improve glute med strength to 4-/5 in order to stand for long periods.  2. Patient will improve psoas strength to 4/5 in order to go up and down stairs.  3. Patient will improve lumbar extension to 100% in order to sleep at night.   4. Patient will demonstrate normal gait mechanics in order to minimize any compensation and return to PLOF.   5.  Patient will self report improvement to 32% limitation on the FOTO Low Back Survey.    Goals Key:  PC= progressing/continue; PM= partially met;        DC= discontinue    PLAN     Continue Plan of Care (POC) and progress per patient tolerance. See treatment section for details on planned progressions next session.      Nayan Boucher, PTA

## 2022-07-13 ENCOUNTER — CLINICAL SUPPORT (OUTPATIENT)
Dept: REHABILITATION | Facility: HOSPITAL | Age: 82
End: 2022-07-13
Payer: MEDICARE

## 2022-07-13 DIAGNOSIS — G89.29 CHRONIC BILATERAL LOW BACK PAIN WITH LEFT-SIDED SCIATICA: Primary | ICD-10-CM

## 2022-07-13 DIAGNOSIS — M54.42 CHRONIC BILATERAL LOW BACK PAIN WITH LEFT-SIDED SCIATICA: Primary | ICD-10-CM

## 2022-07-13 PROCEDURE — 97110 THERAPEUTIC EXERCISES: CPT

## 2022-07-13 NOTE — PROGRESS NOTES
[facility]   Physical Therapy Treatment Note     Name: Nerissa Burnham  [facility] Number: [id number]    Therapy Diagnosis:   Encounter Diagnosis   Name Primary?    Chronic bilateral low back pain with left-sided sciatica Yes     Physician: Artie Washington MD    Visit Date: 7/13/2022      Physician Orders: PT Eval and Treat  Medical Diagnosis from Referral: M54.16 (ICD-10-CM) - Lumbar radiculopathy  Evaluation Date: 6/23/2022  Authorization Period Expiration: 12/31/2022  Plan of Care Expiration: 8/4/2022  Progress Note Due: 7/23/2022  Visit # / Visits authorized: 5/20 (+ 1 evaluation)  FOTO: 2/2     Time In: 1:00pm  Time Out: 1:45pm  Total Appointment Time: 45 minutes     Precautions: Standard and Fall    SUBJECTIVE     Patient reports: She has been feeling really good with no pain. She states she will make today her last visit because her move in date has been moved up to the 19th.   She was compliant with home exercise program 4 times since her evaluation.   Response to previous treatment: felt good after last treatment  Functional change: Ambulating with no straight cane since her last therapy session.      Pain: 0/10     Location: left lower lower extremity and into her hip    OBJECTIVE     Objective Measures updated at progress report unless specified.     ROM    Right (degrees) Left (degrees)   Lumbar Flexion  100% 100%   Lumbar Extension 100% 100%    Lumbar Sidebending 100% 100%    Lumbar Rotation 100% 100%       Strength    Right     Left   Gluteus Sanjeev 4/5 4/5   Gluteus Medius 4-/5 4-/5   Hip Adductors 4/5 4/5   Psoas 4/5 4/5   Quadriceps 4/5 4/5   Hamstrings 4/5 4/5   Anterior Tibialis 4+/5 4+/5   Gastroc/Soleus 4+/5 4+/5   Transverse Abdominis 4-/5 4-/5      Movement Analysis:   Test Right Left   Sit to Stand 30 secs 10 reps   Single Leg Balance Normal with one hand, improving with no hands Normal with one hand, improving with no hands      Gait Analysis: The patient  ambulated with the use of no assistive device and presents with the following gait abnormalities: normal     FOTO: 17% limitation     TREATMENT     Nerissa received the treatments listed below:     THERAPEUTIC EXERCISES to develop strength, endurance, ROM, flexibility, posture, and core stabilization for (30) minutes including:    Intervention Performed Today    Exercise bike x 5 minutes level 2    Posterior pelvic tilt   3 minutes 5 second hold   Bridges with abdominal brace  x 3 x 10   Clams  x 3 x 10    Hip abduction hook lying with band   1 minute x 3    Hip adduction with ball  x 3 minutes 5 second hold   Straight leg raise with abdominal brace  x 3 x 10 bilaterally with cues for technique   Piriformis stretch  3 x 30 seconds bilateral    Sit to stand x 3 x 10 no hands   Reassessment  x All measures and tests      Plan for Next Visit:        NEUROMUSCULAR RE-EDUCATION ACTIVITIES to improve Balance, Coordination and Posture for (15) minutes.  The following were included:    Intervention Performed Today    Side to side steps x 3 minutes   Step ups x X 30 bilateral 4 inch box no hands cues to slow reps   March in place x 3 x 10 no hands cues to slow reps   Heel raises x 3 x 10 no hands cues to slow reps   Toe raises                      Plan for Next Visit:        PATIENT EDUCATION AND HOME EXERCISES     Home Exercises Provided and Patient Education Provided     Education provided: (durng session) minutes   Follow up with physician or therapy department at home when she is moved in.     Written Home Exercises Provided: yes.  Exercises were reviewed and Nerissa was able to demonstrate them prior to the end of the session.  Nerissa demonstrated good  understanding of the education provided. See EMR under Patient Instructions for exercises provided during therapy sessions.    ASSESSMENT     Patient has sen very good progress through her time in therapy. Her range of motion has increased and is no longer painful along with  increases in muscle strength. She was advised to follow up with physician or contact the therapy department at the home she is moving into if she would like to continue therapy as it would danyell beneficial to help with general conditioning. Ms Multani tolerated today's session very well.     Nerissa is progressing well towards her goals.   Pt prognosis is Good.     Pt will continue to benefit from skilled outpatient physical therapy to address the deficits listed in the problem list box on initial evaluation, provide pt/family education and to maximize pt's level of independence in the home and community environment.     Pt's spiritual, cultural and educational needs considered and pt agreeable to plan of care and goals.       Anticipated Barriers for therapy: co-morbidities       Goals:  Short Term Goals: 3 weeks   1. Recent signs and systems trend is improving in order to progress towards LTG's. MET  2. Patient will improve lumbar flexion to 100% in order to reach to floor. MET  3. Patient will be independent with HEP in order to further progress and return to maximal function. MET  4. Pain rating at Worst: 4/10 in order to progress towards increased independence with activity. MET     Long Term Goals: 6 weeks   1. Patient will improve glute med strength to 4-/5 in order to stand for long periods. MET  2. Patient will improve psoas strength to 4/5 in order to go up and down stairs. MET  3. Patient will improve lumbar extension to 100% in order to sleep at night. MET  4. Patient will demonstrate normal gait mechanics in order to minimize any compensation and return to PLOF. MET  5. Patient will self report improvement to 32% limitation on the FOTO Low Back Survey.  MET    PLAN     Patient is being discharged from physical therapy.     Artie Tiwari, PT, DPT

## 2022-07-13 NOTE — PLAN OF CARE
[facility]  PT Discharge Note    Name: Nerissa Burnham  Clinic Number: [id number]    Therapy Diagnosis:   Encounter Diagnosis   Name Primary?    Chronic bilateral low back pain with left-sided sciatica Yes     Physician: Artie Washington MD    Physician Orders: PT Eval and Treat  Medical Diagnosis from Referral: M54.16 (ICD-10-CM) - Lumbar radiculopathy  Evaluation Date: 6/23/2022    Date of Last visit: 7/13/2022  Total Visits Received: 6    ASSESSMENT      Patient has sen very good progress through her time in therapy. Her range of motion has increased and is no longer painful along with increases in muscle strength. She was advised to follow up with physician or contact the therapy department at the home she is moving into if she would like to continue therapy as it would danyell beneficial to help with general conditioning.     Discharge reason: Patient has met all of his/her goals and Patient requested discharge    Discharge FOTO Score: 17% limitation    Goals:  Short Term Goals: 3 weeks   1. Recent signs and systems trend is improving in order to progress towards LTG's. MET  2. Patient will improve lumbar flexion to 100% in order to reach to floor. MET  3. Patient will be independent with HEP in order to further progress and return to maximal function. MET  4. Pain rating at Worst: 4/10 in order to progress towards increased independence with activity. MET     Long Term Goals: 6 weeks   1. Patient will improve glute med strength to 4-/5 in order to stand for long periods. MET  2. Patient will improve psoas strength to 4/5 in order to go up and down stairs. MET  3. Patient will improve lumbar extension to 100% in order to sleep at night. MET  4. Patient will demonstrate normal gait mechanics in order to minimize any compensation and return to PLOF. MET  5. Patient will self report improvement to 32% limitation on the FOTO Low Back Survey.  MET    PLAN   This patient is discharged from  Physical Therapy      Artie Tiwari, PT, DPT

## 2022-07-31 ENCOUNTER — EXTERNAL CHRONIC CARE MANAGEMENT (OUTPATIENT)
Dept: PRIMARY CARE CLINIC | Facility: CLINIC | Age: 82
End: 2022-07-31
Payer: MEDICARE

## 2022-07-31 PROCEDURE — 99490 CHRNC CARE MGMT STAFF 1ST 20: CPT | Mod: PBBFAC | Performed by: INTERNAL MEDICINE

## 2022-07-31 PROCEDURE — 99490 CHRNC CARE MGMT STAFF 1ST 20: CPT | Mod: S$PBB,,, | Performed by: INTERNAL MEDICINE

## 2022-07-31 PROCEDURE — 99490 PR CHRONIC CARE MGMT, 1ST 20 MIN: ICD-10-PCS | Mod: S$PBB,,, | Performed by: INTERNAL MEDICINE

## 2022-08-31 ENCOUNTER — EXTERNAL CHRONIC CARE MANAGEMENT (OUTPATIENT)
Dept: PRIMARY CARE CLINIC | Facility: CLINIC | Age: 82
End: 2022-08-31
Payer: MEDICARE

## 2022-08-31 PROCEDURE — 99490 CHRNC CARE MGMT STAFF 1ST 20: CPT | Mod: S$PBB,,, | Performed by: INTERNAL MEDICINE

## 2022-08-31 PROCEDURE — 99490 PR CHRONIC CARE MGMT, 1ST 20 MIN: ICD-10-PCS | Mod: S$PBB,,, | Performed by: INTERNAL MEDICINE

## 2022-08-31 PROCEDURE — 99490 CHRNC CARE MGMT STAFF 1ST 20: CPT | Mod: PBBFAC | Performed by: INTERNAL MEDICINE

## 2022-09-21 ENCOUNTER — TELEPHONE (OUTPATIENT)
Dept: INTERNAL MEDICINE | Facility: CLINIC | Age: 82
End: 2022-09-21
Payer: MEDICARE

## 2022-09-21 NOTE — TELEPHONE ENCOUNTER
----- Message from Tariq Quigley sent at 9/21/2022 11:04 AM CDT -----  Contact: [phone]  Patient would like to consult with a nurse in regards to orders for a specimen she stated she possibly have a bladder infection. Please call back [phone]. Thanks valencia kelsey

## 2022-09-26 ENCOUNTER — LAB VISIT (OUTPATIENT)
Dept: LAB | Facility: HOSPITAL | Age: 82
End: 2022-09-26
Attending: NURSE PRACTITIONER
Payer: MEDICARE

## 2022-09-26 ENCOUNTER — OFFICE VISIT (OUTPATIENT)
Dept: INTERNAL MEDICINE | Facility: CLINIC | Age: 82
End: 2022-09-26
Payer: MEDICARE

## 2022-09-26 VITALS
HEIGHT: 65 IN | WEIGHT: 130.75 LBS | TEMPERATURE: 97 F | HEART RATE: 80 BPM | DIASTOLIC BLOOD PRESSURE: 70 MMHG | BODY MASS INDEX: 21.79 KG/M2 | OXYGEN SATURATION: 97 % | SYSTOLIC BLOOD PRESSURE: 112 MMHG

## 2022-09-26 DIAGNOSIS — F41.9 ANXIETY: ICD-10-CM

## 2022-09-26 DIAGNOSIS — Z23 NEEDS FLU SHOT: ICD-10-CM

## 2022-09-26 DIAGNOSIS — R39.9 UTI SYMPTOMS: Primary | ICD-10-CM

## 2022-09-26 DIAGNOSIS — R39.9 UTI SYMPTOMS: ICD-10-CM

## 2022-09-26 DIAGNOSIS — E78.2 MIXED HYPERLIPIDEMIA: ICD-10-CM

## 2022-09-26 DIAGNOSIS — I10 PRIMARY HYPERTENSION: ICD-10-CM

## 2022-09-26 LAB
ANION GAP SERPL CALC-SCNC: 9 MMOL/L (ref 8–16)
BUN SERPL-MCNC: 24 MG/DL (ref 8–23)
CALCIUM SERPL-MCNC: 9.9 MG/DL (ref 8.7–10.5)
CHLORIDE SERPL-SCNC: 107 MMOL/L (ref 95–110)
CO2 SERPL-SCNC: 24 MMOL/L (ref 23–29)
CREAT SERPL-MCNC: 0.9 MG/DL (ref 0.5–1.4)
EST. GFR  (NO RACE VARIABLE): >60 ML/MIN/1.73 M^2
GLUCOSE SERPL-MCNC: 88 MG/DL (ref 70–110)
POTASSIUM SERPL-SCNC: 4.8 MMOL/L (ref 3.5–5.1)
SODIUM SERPL-SCNC: 140 MMOL/L (ref 136–145)

## 2022-09-26 PROCEDURE — 90662 IIV NO PRSV INCREASED AG IM: CPT | Mod: PBBFAC

## 2022-09-26 PROCEDURE — 99214 OFFICE O/P EST MOD 30 MIN: CPT | Mod: S$PBB,,, | Performed by: NURSE PRACTITIONER

## 2022-09-26 PROCEDURE — 80048 BASIC METABOLIC PNL TOTAL CA: CPT | Performed by: NURSE PRACTITIONER

## 2022-09-26 PROCEDURE — 99215 OFFICE O/P EST HI 40 MIN: CPT | Mod: PBBFAC | Performed by: NURSE PRACTITIONER

## 2022-09-26 PROCEDURE — 99999 PR PBB SHADOW E&M-EST. PATIENT-LVL V: CPT | Mod: PBBFAC,,, | Performed by: NURSE PRACTITIONER

## 2022-09-26 PROCEDURE — 99999 PR PBB SHADOW E&M-EST. PATIENT-LVL V: ICD-10-PCS | Mod: PBBFAC,,, | Performed by: NURSE PRACTITIONER

## 2022-09-26 PROCEDURE — 99214 PR OFFICE/OUTPT VISIT, EST, LEVL IV, 30-39 MIN: ICD-10-PCS | Mod: S$PBB,,, | Performed by: NURSE PRACTITIONER

## 2022-09-26 PROCEDURE — 36415 COLL VENOUS BLD VENIPUNCTURE: CPT | Performed by: NURSE PRACTITIONER

## 2022-09-26 PROCEDURE — G0008 ADMIN INFLUENZA VIRUS VAC: HCPCS | Mod: PBBFAC

## 2022-09-26 RX ORDER — CEFDINIR 300 MG/1
300 CAPSULE ORAL 2 TIMES DAILY
Qty: 14 CAPSULE | Refills: 0 | Status: SHIPPED | OUTPATIENT
Start: 2022-09-26 | End: 2022-10-03

## 2022-09-30 ENCOUNTER — EXTERNAL CHRONIC CARE MANAGEMENT (OUTPATIENT)
Dept: PRIMARY CARE CLINIC | Facility: CLINIC | Age: 82
End: 2022-09-30
Payer: MEDICARE

## 2022-09-30 PROCEDURE — 99490 CHRNC CARE MGMT STAFF 1ST 20: CPT | Mod: S$PBB,,, | Performed by: INTERNAL MEDICINE

## 2022-09-30 PROCEDURE — 99490 CHRNC CARE MGMT STAFF 1ST 20: CPT | Mod: PBBFAC | Performed by: INTERNAL MEDICINE

## 2022-09-30 PROCEDURE — 99490 PR CHRONIC CARE MGMT, 1ST 20 MIN: ICD-10-PCS | Mod: S$PBB,,, | Performed by: INTERNAL MEDICINE

## 2022-10-31 ENCOUNTER — EXTERNAL CHRONIC CARE MANAGEMENT (OUTPATIENT)
Dept: PRIMARY CARE CLINIC | Facility: CLINIC | Age: 82
End: 2022-10-31
Payer: MEDICARE

## 2022-10-31 PROCEDURE — 99490 CHRNC CARE MGMT STAFF 1ST 20: CPT | Mod: S$PBB,,, | Performed by: INTERNAL MEDICINE

## 2022-10-31 PROCEDURE — 99490 CHRNC CARE MGMT STAFF 1ST 20: CPT | Mod: PBBFAC | Performed by: INTERNAL MEDICINE

## 2022-10-31 PROCEDURE — 99490 PR CHRONIC CARE MGMT, 1ST 20 MIN: ICD-10-PCS | Mod: S$PBB,,, | Performed by: INTERNAL MEDICINE

## 2022-11-30 ENCOUNTER — EXTERNAL CHRONIC CARE MANAGEMENT (OUTPATIENT)
Dept: PRIMARY CARE CLINIC | Facility: CLINIC | Age: 82
End: 2022-11-30
Payer: MEDICARE

## 2022-11-30 PROCEDURE — 99490 CHRNC CARE MGMT STAFF 1ST 20: CPT | Mod: PBBFAC | Performed by: INTERNAL MEDICINE

## 2022-11-30 PROCEDURE — 99490 CHRNC CARE MGMT STAFF 1ST 20: CPT | Mod: S$PBB,,, | Performed by: INTERNAL MEDICINE

## 2022-11-30 PROCEDURE — 99490 PR CHRONIC CARE MGMT, 1ST 20 MIN: ICD-10-PCS | Mod: S$PBB,,, | Performed by: INTERNAL MEDICINE

## 2022-12-31 ENCOUNTER — EXTERNAL CHRONIC CARE MANAGEMENT (OUTPATIENT)
Dept: PRIMARY CARE CLINIC | Facility: CLINIC | Age: 82
End: 2022-12-31
Payer: MEDICARE

## 2022-12-31 PROCEDURE — 99490 CHRNC CARE MGMT STAFF 1ST 20: CPT | Mod: PBBFAC | Performed by: INTERNAL MEDICINE

## 2022-12-31 PROCEDURE — 99490 CHRNC CARE MGMT STAFF 1ST 20: CPT | Mod: S$PBB,,, | Performed by: INTERNAL MEDICINE

## 2022-12-31 PROCEDURE — 99490 PR CHRONIC CARE MGMT, 1ST 20 MIN: ICD-10-PCS | Mod: S$PBB,,, | Performed by: INTERNAL MEDICINE

## 2023-01-31 ENCOUNTER — EXTERNAL CHRONIC CARE MANAGEMENT (OUTPATIENT)
Dept: PRIMARY CARE CLINIC | Facility: CLINIC | Age: 83
End: 2023-01-31
Payer: MEDICARE

## 2023-01-31 PROCEDURE — 99490 PR CHRONIC CARE MGMT, 1ST 20 MIN: ICD-10-PCS | Mod: S$PBB,,, | Performed by: INTERNAL MEDICINE

## 2023-01-31 PROCEDURE — 99490 CHRNC CARE MGMT STAFF 1ST 20: CPT | Mod: S$PBB,,, | Performed by: INTERNAL MEDICINE

## 2023-01-31 PROCEDURE — 99490 CHRNC CARE MGMT STAFF 1ST 20: CPT | Mod: PBBFAC | Performed by: INTERNAL MEDICINE

## 2023-02-28 ENCOUNTER — EXTERNAL CHRONIC CARE MANAGEMENT (OUTPATIENT)
Dept: PRIMARY CARE CLINIC | Facility: CLINIC | Age: 83
End: 2023-02-28
Payer: MEDICARE

## 2023-02-28 PROCEDURE — 99439 PR CHRONIC CARE MGMT, EA ADDTL 20 MIN: ICD-10-PCS | Mod: S$PBB,,, | Performed by: INTERNAL MEDICINE

## 2023-02-28 PROCEDURE — 99439 CHRNC CARE MGMT STAF EA ADDL: CPT | Mod: S$PBB,,, | Performed by: INTERNAL MEDICINE

## 2023-02-28 PROCEDURE — 99490 CHRNC CARE MGMT STAFF 1ST 20: CPT | Mod: S$PBB,,, | Performed by: INTERNAL MEDICINE

## 2023-02-28 PROCEDURE — 99490 CHRNC CARE MGMT STAFF 1ST 20: CPT | Mod: PBBFAC | Performed by: INTERNAL MEDICINE

## 2023-02-28 PROCEDURE — 99439 CHRNC CARE MGMT STAF EA ADDL: CPT | Mod: PBBFAC,27 | Performed by: INTERNAL MEDICINE

## 2023-02-28 PROCEDURE — 99490 PR CHRONIC CARE MGMT, 1ST 20 MIN: ICD-10-PCS | Mod: S$PBB,,, | Performed by: INTERNAL MEDICINE

## 2023-03-14 DIAGNOSIS — I10 PRIMARY HYPERTENSION: ICD-10-CM

## 2023-03-14 DIAGNOSIS — E78.5 HYPERLIPIDEMIA, UNSPECIFIED HYPERLIPIDEMIA TYPE: ICD-10-CM

## 2023-03-14 RX ORDER — ATORVASTATIN CALCIUM 10 MG/1
TABLET, FILM COATED ORAL
Qty: 90 TABLET | Refills: 0 | Status: SHIPPED | OUTPATIENT
Start: 2023-03-14 | End: 2023-04-17 | Stop reason: SDUPTHER

## 2023-03-14 RX ORDER — RAMIPRIL 2.5 MG/1
2.5 CAPSULE ORAL DAILY
Qty: 90 CAPSULE | Refills: 0 | Status: SHIPPED | OUTPATIENT
Start: 2023-03-14 | End: 2023-04-17 | Stop reason: SDUPTHER

## 2023-03-14 NOTE — TELEPHONE ENCOUNTER
Care Due:                  Date            Visit Type   Department     Provider  --------------------------------------------------------------------------------                                EP -                              PRIMARY      [facility] INTERNAL  Last Visit: 06-      [facility] ([facility])   MEDICINE       Artie Washington  Next Visit: None Scheduled  None         None Found                                                            Last  Test          Frequency    Reason                     Performed    Due Date  --------------------------------------------------------------------------------    Office Visit  12 months..  EScitalopram,              06- 06-                             atorvastatin, ramipriL,                             valACYclovir.............    CBC.........  12 months..  valACYclovir.............  10-   10-    CMP.........  12 months..  atorvastatin.............  10-   10-    Lipid Panel.  12 months..  atorvastatin.............  10-   10-    [facility] Embedded Care Gaps. Reference number: [id number]. 3/14/2023   12:10:52 AM CDT

## 2023-03-14 NOTE — TELEPHONE ENCOUNTER
Refill Routing Note   Medication(s) are not appropriate for processing by [facility] for the following reason(s):         Required labs outdated  Drug-disease interaction    [facility] action(s):  Defer    Medication Therapy Plan: Drug-Disease: ramipriL and Diarrhea    Appointments  past 12m or future 3m with PCP    Date Provider   Last Visit   3/4/2022 Avelino Gudino MD   Next Visit   Visit date not found Avelino Gudino MD   ED visits in past 90 days: 0        Note composed:5:11 AM 03/14/2023

## 2023-03-31 ENCOUNTER — EXTERNAL CHRONIC CARE MANAGEMENT (OUTPATIENT)
Dept: PRIMARY CARE CLINIC | Facility: CLINIC | Age: 83
End: 2023-03-31
Payer: MEDICARE

## 2023-03-31 PROCEDURE — 99490 CHRNC CARE MGMT STAFF 1ST 20: CPT | Mod: PBBFAC | Performed by: INTERNAL MEDICINE

## 2023-03-31 PROCEDURE — 99490 CHRNC CARE MGMT STAFF 1ST 20: CPT | Mod: S$PBB,,, | Performed by: INTERNAL MEDICINE

## 2023-03-31 PROCEDURE — 99490 PR CHRONIC CARE MGMT, 1ST 20 MIN: ICD-10-PCS | Mod: S$PBB,,, | Performed by: INTERNAL MEDICINE

## 2023-04-14 ENCOUNTER — TELEPHONE (OUTPATIENT)
Dept: ADMINISTRATIVE | Facility: HOSPITAL | Age: 83
End: 2023-04-14
Payer: MEDICARE

## 2023-04-17 ENCOUNTER — LAB VISIT (OUTPATIENT)
Dept: LAB | Facility: HOSPITAL | Age: 83
End: 2023-04-17
Attending: INTERNAL MEDICINE
Payer: MEDICARE

## 2023-04-17 ENCOUNTER — OFFICE VISIT (OUTPATIENT)
Dept: INTERNAL MEDICINE | Facility: CLINIC | Age: 83
End: 2023-04-17
Payer: MEDICARE

## 2023-04-17 ENCOUNTER — E-CONSULT (OUTPATIENT)
Dept: RHEUMATOLOGY | Facility: CLINIC | Age: 83
End: 2023-04-17
Payer: MEDICARE

## 2023-04-17 VITALS
TEMPERATURE: 99 F | DIASTOLIC BLOOD PRESSURE: 70 MMHG | SYSTOLIC BLOOD PRESSURE: 118 MMHG | HEIGHT: 65 IN | BODY MASS INDEX: 22.77 KG/M2 | OXYGEN SATURATION: 97 % | HEART RATE: 70 BPM | WEIGHT: 136.69 LBS

## 2023-04-17 DIAGNOSIS — F32.A DEPRESSION, UNSPECIFIED DEPRESSION TYPE: ICD-10-CM

## 2023-04-17 DIAGNOSIS — I10 PRIMARY HYPERTENSION: ICD-10-CM

## 2023-04-17 DIAGNOSIS — E78.2 MIXED HYPERLIPIDEMIA: ICD-10-CM

## 2023-04-17 DIAGNOSIS — E78.5 HYPERLIPIDEMIA, UNSPECIFIED HYPERLIPIDEMIA TYPE: ICD-10-CM

## 2023-04-17 DIAGNOSIS — F41.9 ANXIETY: Primary | ICD-10-CM

## 2023-04-17 DIAGNOSIS — M85.80 OSTEOPENIA, UNSPECIFIED LOCATION: ICD-10-CM

## 2023-04-17 DIAGNOSIS — M85.89 OSTEOPENIA OF MULTIPLE SITES: Primary | ICD-10-CM

## 2023-04-17 DIAGNOSIS — I48.0 PAF (PAROXYSMAL ATRIAL FIBRILLATION): ICD-10-CM

## 2023-04-17 LAB
BASOPHILS # BLD AUTO: 0.08 K/UL (ref 0–0.2)
BASOPHILS NFR BLD: 0.9 % (ref 0–1.9)
DIFFERENTIAL METHOD: ABNORMAL
EOSINOPHIL # BLD AUTO: 0.1 K/UL (ref 0–0.5)
EOSINOPHIL NFR BLD: 1.6 % (ref 0–8)
ERYTHROCYTE [DISTWIDTH] IN BLOOD BY AUTOMATED COUNT: 12 % (ref 11.5–14.5)
HCT VFR BLD AUTO: 41.3 % (ref 37–48.5)
HGB BLD-MCNC: 13.2 G/DL (ref 12–16)
IMM GRANULOCYTES # BLD AUTO: 0.07 K/UL (ref 0–0.04)
IMM GRANULOCYTES NFR BLD AUTO: 0.8 % (ref 0–0.5)
LYMPHOCYTES # BLD AUTO: 1.7 K/UL (ref 1–4.8)
LYMPHOCYTES NFR BLD: 20 % (ref 18–48)
MCH RBC QN AUTO: 30.9 PG (ref 27–31)
MCHC RBC AUTO-ENTMCNC: 32 G/DL (ref 32–36)
MCV RBC AUTO: 97 FL (ref 82–98)
MONOCYTES # BLD AUTO: 1.2 K/UL (ref 0.3–1)
MONOCYTES NFR BLD: 13.6 % (ref 4–15)
NEUTROPHILS # BLD AUTO: 5.4 K/UL (ref 1.8–7.7)
NEUTROPHILS NFR BLD: 63.1 % (ref 38–73)
NRBC BLD-RTO: 0 /100 WBC
PLATELET # BLD AUTO: 351 K/UL (ref 150–450)
PMV BLD AUTO: 10.8 FL (ref 9.2–12.9)
RBC # BLD AUTO: 4.27 M/UL (ref 4–5.4)
WBC # BLD AUTO: 8.5 K/UL (ref 3.9–12.7)

## 2023-04-17 PROCEDURE — 99999 PR PBB SHADOW E&M-EST. PATIENT-LVL III: ICD-10-PCS | Mod: PBBFAC,,, | Performed by: INTERNAL MEDICINE

## 2023-04-17 PROCEDURE — 99214 PR OFFICE/OUTPT VISIT, EST, LEVL IV, 30-39 MIN: ICD-10-PCS | Mod: S$PBB,,, | Performed by: INTERNAL MEDICINE

## 2023-04-17 PROCEDURE — 99214 OFFICE O/P EST MOD 30 MIN: CPT | Mod: S$PBB,,, | Performed by: INTERNAL MEDICINE

## 2023-04-17 PROCEDURE — 99999 PR PBB SHADOW E&M-EST. PATIENT-LVL III: CPT | Mod: PBBFAC,,, | Performed by: INTERNAL MEDICINE

## 2023-04-17 PROCEDURE — 99451 NTRPROF PH1/NTRNET/EHR 5/>: CPT | Mod: S$PBB,,, | Performed by: INTERNAL MEDICINE

## 2023-04-17 PROCEDURE — 85025 COMPLETE CBC W/AUTO DIFF WBC: CPT | Performed by: INTERNAL MEDICINE

## 2023-04-17 PROCEDURE — 99213 OFFICE O/P EST LOW 20 MIN: CPT | Mod: PBBFAC | Performed by: INTERNAL MEDICINE

## 2023-04-17 PROCEDURE — 36415 COLL VENOUS BLD VENIPUNCTURE: CPT | Performed by: INTERNAL MEDICINE

## 2023-04-17 PROCEDURE — 99451 PR INTERPROF, PHONE/INTERNET/EHR, CONSULT, >= 5MINS: ICD-10-PCS | Mod: S$PBB,,, | Performed by: INTERNAL MEDICINE

## 2023-04-17 PROCEDURE — 80053 COMPREHEN METABOLIC PANEL: CPT | Performed by: INTERNAL MEDICINE

## 2023-04-17 PROCEDURE — 84443 ASSAY THYROID STIM HORMONE: CPT | Performed by: INTERNAL MEDICINE

## 2023-04-17 PROCEDURE — 80061 LIPID PANEL: CPT | Performed by: INTERNAL MEDICINE

## 2023-04-17 RX ORDER — ATORVASTATIN CALCIUM 10 MG/1
10 TABLET, FILM COATED ORAL DAILY
Qty: 90 TABLET | Refills: 1 | Status: SHIPPED | OUTPATIENT
Start: 2023-04-17 | End: 2023-05-08 | Stop reason: SDUPTHER

## 2023-04-17 RX ORDER — ESCITALOPRAM OXALATE 20 MG/1
20 TABLET ORAL DAILY
Qty: 90 TABLET | Refills: 1 | Status: SHIPPED | OUTPATIENT
Start: 2023-04-17 | End: 2023-05-08 | Stop reason: SDUPTHER

## 2023-04-17 RX ORDER — RAMIPRIL 2.5 MG/1
2.5 CAPSULE ORAL DAILY
Qty: 90 CAPSULE | Refills: 1 | Status: SHIPPED | OUTPATIENT
Start: 2023-04-17 | End: 2023-05-08 | Stop reason: SDUPTHER

## 2023-04-17 NOTE — PROGRESS NOTES
"  Subjective:      Patient ID: Nerissa Burnham is a 83 y.o. female.    Chief Complaint: Annual Exam  Here for annual exam. Compliant with medications. No further complaints.   HPI    84 yo with   Patient Active Problem List   Diagnosis    Hypertension    Hyperlipidemia    Depression    Osteopenia    HSV-2 infection    Anxiety    GERD (gastroesophageal reflux disease)    Palpitation    PAF (paroxysmal atrial fibrillation)    Diarrhea    Family history of factor V Leiden mutation    Nonrheumatic aortic valve insufficiency     Past Medical History:   Diagnosis Date    Depression     Encounter for blood transfusion     Hyperlipidemia     Hypertension      Here today for management of mult med problems as outlined below.  Compliant with meds without significant side effects. Energy and appetite good.     Review of Systems   Constitutional:  Negative for chills and fever.   HENT:  Negative for ear pain and sore throat.    Respiratory:  Negative for cough.    Cardiovascular:  Negative for chest pain.   Gastrointestinal:  Negative for abdominal pain and blood in stool.   Genitourinary:  Negative for dysuria and hematuria.   Neurological:  Negative for seizures and syncope.   Objective:   /70 (BP Location: Left arm, Patient Position: Sitting, BP Method: Medium (Manual))   Pulse 70   Temp 98.8 °F (37.1 °C) (Tympanic)   Ht 5' 5" (1.651 m)   Wt 62 kg (136 lb 11 oz)   SpO2 97%   BMI 22.75 kg/m²     Physical Exam  Constitutional:       General: She is not in acute distress.     Appearance: She is well-developed.   HENT:      Head: Normocephalic and atraumatic.   Eyes:      Extraocular Movements: Extraocular movements intact.   Neck:      Thyroid: No thyromegaly.   Cardiovascular:      Rate and Rhythm: Normal rate and regular rhythm.   Pulmonary:      Breath sounds: Normal breath sounds. No wheezing or rales.   Abdominal:      General: Bowel sounds are normal.      Palpations: Abdomen is soft.      Tenderness: There is " no abdominal tenderness.   Musculoskeletal:         General: No swelling.      Cervical back: Neck supple. No rigidity.   Lymphadenopathy:      Cervical: No cervical adenopathy.   Skin:     General: Skin is warm and dry.   Neurological:      Mental Status: She is alert and oriented to person, place, and time.   Psychiatric:         Behavior: Behavior normal.       Lab Results   Component Value Date    WBC 5.77 10/06/2021    HGB 12.6 10/06/2021    HGB 12.5 10/29/2020    HGB 12.6 10/25/2019    HCT 37.7 10/06/2021    MCV 98 10/06/2021     (H) 10/29/2020    MCV 98 10/25/2019     10/06/2021    CHOL 151 10/06/2021    TRIG 56 10/06/2021    HDL 67 10/06/2021    LDLCALC 72.8 10/06/2021    LDLCALC 83.0 10/29/2020    LDLCALC 86.2 10/25/2019    ALT 15 10/06/2021    AST 20 10/06/2021     09/26/2022    K 4.8 09/26/2022     09/26/2022    CO2 24 09/26/2022    BUN 24 (H) 09/26/2022    CREATININE 0.9 09/26/2022    CREATININE 0.9 10/06/2021    CREATININE 0.9 10/29/2020    EGFRNORACEVR >60.0 09/26/2022    TSH 1.909 10/06/2021    TSH 2.003 10/29/2020    TSH 1.862 10/25/2019    GLU 88 09/26/2022    NRZJRLZV77YW 52 10/29/2020    PDENKBUG89SU 44 10/25/2019    AZUADTYJ92ON 47 10/17/2018          The ASCVD Risk score ([place] DK, et al., 2019) failed to calculate for the following reasons:    The 2019 ASCVD risk score is only valid for ages 40 to 79     Assessment:     1. Anxiety    2. Depression, unspecified depression type    3. Mixed hyperlipidemia    4. Primary hypertension    5. Osteopenia, unspecified location    6. PAF (paroxysmal atrial fibrillation)    7. Hyperlipidemia, unspecified hyperlipidemia type      Plan:   1. Anxiety  Stable on lexapro  -     EScitalopram oxalate (LEXAPRO) 20 MG tablet; Take 1 tablet (20 mg total) by mouth once daily.  Dispense: 90 tablet; Refill: 1    2. Depression, unspecified depression type  Stable on lexapro  3. Mixed   stable  -     Lipid Panel; Future; Expected date:  04/17/2023    4. Primary hypertension  controlled  -     ramipriL (ALTACE) 2.5 MG capsule; Take 1 capsule (2.5 mg total) by mouth once daily.  Dispense: 90 capsule; Refill: 1  -     Comprehensive Metabolic Panel; Future; Expected date: 04/17/2023  -     CBC Auto Differential; Future; Expected date: 04/17/2023  -     TSH; Future; Expected date: 04/17/2023    5. Osteopenia, unspecified location  H/o   -     Cancel: E-Consult to Rheumatology  -     E-Consult to Rheumatology    6. PAF (paroxysmal atrial fibrillation)  Rate controlled  Cont recs and f/u as per cards.    7. Hyperlipidemia, unspecified hyperlipidemia type  Stable. Cont statin.   -     atorvastatin (LIPITOR) 10 MG tablet; Take 1 tablet (10 mg total) by mouth once daily.  Dispense: 90 tablet; Refill: 1        There are no Patient Instructions on file for this visit.    Future Appointments   Date Time Provider Department Center   5/9/2023  4:20 PM Duke Cueva MD [facility] [place]   4/16/2024  2:40 PM Avelino Gudino MD [facility] [place]           Follow up in about 1 year (around 4/17/2024), or if symptoms worsen or fail to improve.

## 2023-04-18 ENCOUNTER — TELEPHONE (OUTPATIENT)
Dept: INTERNAL MEDICINE | Facility: CLINIC | Age: 83
End: 2023-04-18
Payer: MEDICARE

## 2023-04-18 DIAGNOSIS — M85.89 OSTEOPENIA OF MULTIPLE SITES: Primary | ICD-10-CM

## 2023-04-18 LAB
ALBUMIN SERPL BCP-MCNC: 4.3 G/DL (ref 3.5–5.2)
ALP SERPL-CCNC: 49 U/L (ref 55–135)
ALT SERPL W/O P-5'-P-CCNC: 11 U/L (ref 10–44)
ANION GAP SERPL CALC-SCNC: 10 MMOL/L (ref 8–16)
AST SERPL-CCNC: 18 U/L (ref 10–40)
BILIRUB SERPL-MCNC: 0.3 MG/DL (ref 0.1–1)
BUN SERPL-MCNC: 23 MG/DL (ref 8–23)
CALCIUM SERPL-MCNC: 9.5 MG/DL (ref 8.7–10.5)
CHLORIDE SERPL-SCNC: 104 MMOL/L (ref 95–110)
CHOLEST SERPL-MCNC: 182 MG/DL (ref 120–199)
CHOLEST/HDLC SERPL: 3.1 {RATIO} (ref 2–5)
CO2 SERPL-SCNC: 25 MMOL/L (ref 23–29)
CREAT SERPL-MCNC: 1 MG/DL (ref 0.5–1.4)
EST. GFR  (NO RACE VARIABLE): 56.6 ML/MIN/1.73 M^2
GLUCOSE SERPL-MCNC: 89 MG/DL (ref 70–110)
HDLC SERPL-MCNC: 58 MG/DL (ref 40–75)
HDLC SERPL: 31.9 % (ref 20–50)
LDLC SERPL CALC-MCNC: 103.6 MG/DL (ref 63–159)
NONHDLC SERPL-MCNC: 124 MG/DL
POTASSIUM SERPL-SCNC: 5 MMOL/L (ref 3.5–5.1)
PROT SERPL-MCNC: 6.4 G/DL (ref 6–8.4)
SODIUM SERPL-SCNC: 139 MMOL/L (ref 136–145)
TRIGL SERPL-MCNC: 102 MG/DL (ref 30–150)
TSH SERPL DL<=0.005 MIU/L-ACNC: 2.41 UIU/ML (ref 0.4–4)

## 2023-04-18 NOTE — CONSULTS
[facility] - [facility]umat[facility]  Response for E-Consult     Patient Name: Nerissa Burnham  MRN: [id number]  Primary Care Provider: Avelino Gudino MD   Requesting Provider: Avelino Gudino MD  Consults    Findings: Osteopenia with high FRAX on fosamax holiday. Last DEXA 2021. Please repeat DEXA now and repeat every 2 years thereafter. Restart fosamax if DEXA shows more than 5% loss either at lumbar spine or femur.     I did not speak to the requesting provider verbally about this.     Total time of Consultation: 10 minute    Percentage of time spent on verbal/written discussion: 50%     *This eConsult is based on the clinical data available to me and is furnished without benefit of a physical examination. The eConsult will need to be interpreted in light of any clinical issues or changes in patient status not available to me at the time of filing this eConsults. Significant changes in patient condition or level of acuity should result in immediate formal consultation and reevaluation. Please alert me if you have further questions.    Thank you for your consult.     Joshua Samano MD  [facility] - [facility]umatology [facility]

## 2023-04-18 NOTE — TELEPHONE ENCOUNTER
Please contact patient and notify that I have been in touch with the rheumatologist as we discussed and it is recommended for bone density scan to be completed at this time. Order placed.

## 2023-04-19 ENCOUNTER — TELEPHONE (OUTPATIENT)
Dept: INTERNAL MEDICINE | Facility: CLINIC | Age: 83
End: 2023-04-19
Payer: MEDICARE

## 2023-04-19 NOTE — TELEPHONE ENCOUNTER
----- Message from Margarette Walters sent at 4/19/2023  3:15 PM CDT -----  Regarding: patient call back  Name of Who is Calling: MARK JACKSON [[id number]]      What is the request in detail: Dr Mercedes Lockett is requesting a call back to verify if office staff received fax orders for physical therapy. Please advise!         [facility]        What Number to Call Back if not in [facility]: [phone] Dr Mercedes Lockett

## 2023-04-21 ENCOUNTER — TELEPHONE (OUTPATIENT)
Dept: INTERNAL MEDICINE | Facility: CLINIC | Age: 83
End: 2023-04-21
Payer: MEDICARE

## 2023-04-21 NOTE — TELEPHONE ENCOUNTER
----- Message from Codie Polo sent at 4/21/2023 12:04 PM CDT -----  [facility] is requesting a call back concerning the status of the fax sent over on Monday and again on Wednesday. Call back at [phone] thx jm

## 2023-04-30 ENCOUNTER — EXTERNAL CHRONIC CARE MANAGEMENT (OUTPATIENT)
Dept: PRIMARY CARE CLINIC | Facility: CLINIC | Age: 83
End: 2023-04-30
Payer: MEDICARE

## 2023-04-30 PROCEDURE — 99490 CHRNC CARE MGMT STAFF 1ST 20: CPT | Mod: PBBFAC | Performed by: INTERNAL MEDICINE

## 2023-04-30 PROCEDURE — 99490 PR CHRONIC CARE MGMT, 1ST 20 MIN: ICD-10-PCS | Mod: S$PBB,,, | Performed by: INTERNAL MEDICINE

## 2023-04-30 PROCEDURE — 99490 CHRNC CARE MGMT STAFF 1ST 20: CPT | Mod: S$PBB,,, | Performed by: INTERNAL MEDICINE

## 2023-05-08 DIAGNOSIS — B00.9 HSV-2 INFECTION: ICD-10-CM

## 2023-05-08 DIAGNOSIS — I48.0 PAF (PAROXYSMAL ATRIAL FIBRILLATION): ICD-10-CM

## 2023-05-08 DIAGNOSIS — R00.2 PALPITATION: Primary | ICD-10-CM

## 2023-05-08 DIAGNOSIS — I10 PRIMARY HYPERTENSION: ICD-10-CM

## 2023-05-08 DIAGNOSIS — E78.5 HYPERLIPIDEMIA, UNSPECIFIED HYPERLIPIDEMIA TYPE: ICD-10-CM

## 2023-05-08 DIAGNOSIS — F41.9 ANXIETY: ICD-10-CM

## 2023-05-08 RX ORDER — RAMIPRIL 2.5 MG/1
2.5 CAPSULE ORAL DAILY
Qty: 90 CAPSULE | Refills: 2 | Status: SHIPPED | OUTPATIENT
Start: 2023-05-08 | End: 2023-05-16 | Stop reason: SDUPTHER

## 2023-05-08 RX ORDER — VALACYCLOVIR HYDROCHLORIDE 500 MG/1
500 TABLET, FILM COATED ORAL DAILY
Qty: 90 TABLET | Refills: 2 | Status: SHIPPED | OUTPATIENT
Start: 2023-05-08 | End: 2024-01-14

## 2023-05-08 RX ORDER — ESCITALOPRAM OXALATE 20 MG/1
20 TABLET ORAL DAILY
Qty: 90 TABLET | Refills: 2 | Status: SHIPPED | OUTPATIENT
Start: 2023-05-08 | End: 2023-07-14

## 2023-05-08 RX ORDER — ATORVASTATIN CALCIUM 10 MG/1
10 TABLET, FILM COATED ORAL DAILY
Qty: 90 TABLET | Refills: 2 | Status: SHIPPED | OUTPATIENT
Start: 2023-05-08 | End: 2023-07-14

## 2023-05-08 NOTE — TELEPHONE ENCOUNTER
No care due was identified.  [facility] Embedded Care Due Messages. Reference number: [id number].   5/08/2023 3:52:31 PM CDT

## 2023-05-08 NOTE — TELEPHONE ENCOUNTER
----- Message from Howard Newman sent at 5/8/2023  3:37 PM CDT -----  Contact: jo ann // [facility] is calling in regards to the patient has sign up for Medical Pass Service and she need the  update medication list  fax over to [phone] . Also need the Rx  e-script to [facility] Address: [address] [place] [address] Please call her back if the provider have any question  [phone]      Thanks  CF

## 2023-05-10 ENCOUNTER — TELEPHONE (OUTPATIENT)
Dept: INTERNAL MEDICINE | Facility: CLINIC | Age: 83
End: 2023-05-10
Payer: MEDICARE

## 2023-05-10 NOTE — TELEPHONE ENCOUNTER
----- Message from Karina Alatorre sent at 5/10/2023  8:25 AM CDT -----  Contact: Nurse  Type: RX Refill Request  Who Called:[facility]   Refill or New RX:Refill  RX Name and Strength:XARELTO 20 mg Tab  metoprolol succinate (TOPROL-XL) 25 MG 24 hr tablet  How is the patient currently taking it: As Directed  Is this a 30 or 90 day : as Directed  Preferred Pharmacy/Phone Number:    [facility] [address]  [address] [place] [address]  Phone: [phone] Fax: [phone]      Best Call Back Number:[phone]    Additional Information : Nurse stated any future medication changes would brittany to be fax to there med Management Dept fax number is [phone]

## 2023-05-10 NOTE — TELEPHONE ENCOUNTER
----- Message from Luli Correa sent at 5/10/2023  8:33 AM CDT -----  Contact: [facility] is needing a call back in regards to the patient. She stated that they are needinghand written scripts for the patients medication faxed over to [phone]. Please give her a call back at [phone].

## 2023-05-15 ENCOUNTER — TELEPHONE (OUTPATIENT)
Dept: INTERNAL MEDICINE | Facility: CLINIC | Age: 83
End: 2023-05-15

## 2023-05-15 ENCOUNTER — TELEPHONE (OUTPATIENT)
Dept: CARDIOLOGY | Facility: CLINIC | Age: 83
End: 2023-05-15

## 2023-05-15 NOTE — TELEPHONE ENCOUNTER
----- Message from Tariq Quigley sent at 5/15/2023 11:01 AM CDT -----  [facility]essa patient wellness nurse would like to consult in regards to a fax regarding the patient med management program she stated she is sending out a second attempt. Please call back at [phone]. Thanks

## 2023-05-15 NOTE — TELEPHONE ENCOUNTER
Made several attempts to fax back signed form. Fax unsuccessful. Advised pt son to see if someone can pick it up from the office.

## 2023-05-15 NOTE — TELEPHONE ENCOUNTER
Spoke with med management program in regards to paperwork needed to signed by dr. Cueva. Original paperwork was faxed to wrong office. New fax was sent over this morning.           ----- Message from Tariq Quigley sent at 5/15/2023 11:02 AM CDT -----  [facility] patient wellness nurse would like to consult in regards to a fax regarding the patient med management program she stated she is sending out a second attempt. Please call back at [phone]. Thanks

## 2023-05-16 DIAGNOSIS — I10 ESSENTIAL HYPERTENSION: ICD-10-CM

## 2023-05-16 DIAGNOSIS — I10 PRIMARY HYPERTENSION: ICD-10-CM

## 2023-05-17 RX ORDER — RAMIPRIL 2.5 MG/1
2.5 CAPSULE ORAL DAILY
Qty: 90 CAPSULE | Refills: 2 | Status: SHIPPED | OUTPATIENT
Start: 2023-05-17 | End: 2023-07-14

## 2023-05-17 RX ORDER — METOPROLOL SUCCINATE 25 MG/1
25 TABLET, EXTENDED RELEASE ORAL DAILY
Qty: 90 TABLET | Refills: 3 | Status: SHIPPED | OUTPATIENT
Start: 2023-05-17 | End: 2023-12-04 | Stop reason: SDUPTHER

## 2023-05-30 ENCOUNTER — PES CALL (OUTPATIENT)
Dept: ADMINISTRATIVE | Facility: CLINIC | Age: 83
End: 2023-05-30

## 2023-05-31 ENCOUNTER — EXTERNAL CHRONIC CARE MANAGEMENT (OUTPATIENT)
Dept: PRIMARY CARE CLINIC | Facility: CLINIC | Age: 83
End: 2023-05-31
Payer: MEDICARE

## 2023-05-31 PROCEDURE — 99490 CHRNC CARE MGMT STAFF 1ST 20: CPT | Mod: PBBFAC | Performed by: INTERNAL MEDICINE

## 2023-05-31 PROCEDURE — 99490 CHRNC CARE MGMT STAFF 1ST 20: CPT | Mod: S$PBB,,, | Performed by: INTERNAL MEDICINE

## 2023-05-31 PROCEDURE — 99490 PR CHRONIC CARE MGMT, 1ST 20 MIN: ICD-10-PCS | Mod: S$PBB,,, | Performed by: INTERNAL MEDICINE

## 2023-06-01 ENCOUNTER — TELEPHONE (OUTPATIENT)
Dept: INTERNAL MEDICINE | Facility: CLINIC | Age: 83
End: 2023-06-01

## 2023-06-01 NOTE — TELEPHONE ENCOUNTER
----- Message from Yessica Porras sent at 6/1/2023 10:37 AM CDT -----  Pt's son Dewayne would like a call back before the appt on 06/06/2023. Call back number is [phone]. Thx.EL

## 2023-06-01 NOTE — TELEPHONE ENCOUNTER
Pt son stated that he would like to have a discussion with Dr. Gudino without the patient present regarding her cognitive status. I informed him that he would have to discuss anything regarding the patient with the patient and Dr. Gudino during the visit. He v/u

## 2023-06-05 ENCOUNTER — TELEPHONE (OUTPATIENT)
Dept: INTERNAL MEDICINE | Facility: CLINIC | Age: 83
End: 2023-06-05

## 2023-06-05 NOTE — TELEPHONE ENCOUNTER
----- Message from Tariq Quigley sent at 6/5/2023  1:35 PM CDT -----  Iris with [facility] would like to consult with a nurse in regards to paperwork for the patient transfer for assist living. Please call back at [phone]. Thanks r/s

## 2023-06-06 ENCOUNTER — LAB VISIT (OUTPATIENT)
Dept: LAB | Facility: HOSPITAL | Age: 83
End: 2023-06-06
Attending: INTERNAL MEDICINE
Payer: MEDICARE

## 2023-06-06 ENCOUNTER — TELEPHONE (OUTPATIENT)
Dept: INTERNAL MEDICINE | Facility: CLINIC | Age: 83
End: 2023-06-06

## 2023-06-06 ENCOUNTER — OFFICE VISIT (OUTPATIENT)
Dept: INTERNAL MEDICINE | Facility: CLINIC | Age: 83
End: 2023-06-06
Payer: MEDICARE

## 2023-06-06 VITALS
OXYGEN SATURATION: 95 % | SYSTOLIC BLOOD PRESSURE: 120 MMHG | BODY MASS INDEX: 22.51 KG/M2 | DIASTOLIC BLOOD PRESSURE: 60 MMHG | TEMPERATURE: 99 F | HEART RATE: 67 BPM | WEIGHT: 135.13 LBS | HEIGHT: 65 IN

## 2023-06-06 DIAGNOSIS — I10 PRIMARY HYPERTENSION: ICD-10-CM

## 2023-06-06 DIAGNOSIS — F32.A DEPRESSION, UNSPECIFIED DEPRESSION TYPE: ICD-10-CM

## 2023-06-06 DIAGNOSIS — R41.3 MEMORY LOSS: ICD-10-CM

## 2023-06-06 DIAGNOSIS — M85.89 OSTEOPENIA OF MULTIPLE SITES: ICD-10-CM

## 2023-06-06 DIAGNOSIS — I48.0 PAF (PAROXYSMAL ATRIAL FIBRILLATION): Primary | ICD-10-CM

## 2023-06-06 DIAGNOSIS — E78.2 MIXED HYPERLIPIDEMIA: ICD-10-CM

## 2023-06-06 DIAGNOSIS — F41.9 ANXIETY: ICD-10-CM

## 2023-06-06 PROCEDURE — 99214 PR OFFICE/OUTPT VISIT, EST, LEVL IV, 30-39 MIN: ICD-10-PCS | Mod: S$PBB,,, | Performed by: INTERNAL MEDICINE

## 2023-06-06 PROCEDURE — 99214 OFFICE O/P EST MOD 30 MIN: CPT | Mod: S$PBB,,, | Performed by: INTERNAL MEDICINE

## 2023-06-06 PROCEDURE — 99999 PR PBB SHADOW E&M-EST. PATIENT-LVL IV: CPT | Mod: PBBFAC,,, | Performed by: INTERNAL MEDICINE

## 2023-06-06 PROCEDURE — 99214 OFFICE O/P EST MOD 30 MIN: CPT | Mod: PBBFAC | Performed by: INTERNAL MEDICINE

## 2023-06-06 PROCEDURE — 99999 PR PBB SHADOW E&M-EST. PATIENT-LVL IV: ICD-10-PCS | Mod: PBBFAC,,, | Performed by: INTERNAL MEDICINE

## 2023-06-06 PROCEDURE — 82746 ASSAY OF FOLIC ACID SERUM: CPT | Performed by: INTERNAL MEDICINE

## 2023-06-06 PROCEDURE — 82607 VITAMIN B-12: CPT | Performed by: INTERNAL MEDICINE

## 2023-06-06 PROCEDURE — 36415 COLL VENOUS BLD VENIPUNCTURE: CPT | Performed by: INTERNAL MEDICINE

## 2023-06-06 PROCEDURE — 86592 SYPHILIS TEST NON-TREP QUAL: CPT | Performed by: INTERNAL MEDICINE

## 2023-06-06 RX ORDER — PREDNISOLONE ACETATE 10 MG/ML
SUSPENSION/ DROPS OPHTHALMIC 2 TIMES DAILY
COMMUNITY
Start: 2023-05-08

## 2023-06-06 NOTE — TELEPHONE ENCOUNTER
----- Message from Desean Parmar sent at 6/6/2023 10:38 AM CDT -----  Contact: [facility] with assisted living is asking for an return call in reference to paper work that was faxed over , please call back at [phone] Thx CJ

## 2023-06-06 NOTE — PROGRESS NOTES
"Subjective:      Patient ID: Nerissa Burnham is a 83 y.o. female.    Chief Complaint: paperwork    HPI    82 yo with   Patient Active Problem List   Diagnosis    Hypertension    Hyperlipidemia    Depression    Osteopenia    HSV-2 infection    Anxiety    GERD (gastroesophageal reflux disease)    Palpitation    PAF (paroxysmal atrial fibrillation)    Diarrhea    Family history of factor V Leiden mutation    Nonrheumatic aortic valve insufficiency     Past Medical History:   Diagnosis Date    Depression     Encounter for blood transfusion     Hyperlipidemia     Hypertension      Here today for PE for admission to assisted living facility. Here today with her son who communicated that he is her POA. He reports dx with memory disorder with a neurologist. Ct scan completed. Labs not completed. Meds considered but not prescribed. He reports memory issues cont to worsen over past few months.   See assisted living forms completed by me for further details.     Review of Systems   Constitutional:  Negative for chills, fatigue and fever.   Respiratory:  Negative for cough, shortness of breath and wheezing.    Cardiovascular:  Negative for chest pain.   Gastrointestinal:  Negative for abdominal pain, nausea and vomiting.   Musculoskeletal:  Positive for arthralgias. Negative for neck pain and neck stiffness.   Skin:  Negative for wound.   Neurological:  Negative for dizziness, tremors, syncope, facial asymmetry, speech difficulty, weakness and headaches.   Objective:   /60 (BP Location: Left arm, Patient Position: Sitting, BP Method: Medium (Manual))   Pulse 67   Temp 98.6 °F (37 °C) (Tympanic)   Ht 5' 5" (1.651 m)   Wt 61.3 kg (135 lb 2.3 oz)   SpO2 95%   BMI 22.49 kg/m²     Physical Exam  Constitutional:       General: She is awake. She is not in acute distress.     Appearance: Normal appearance. She is well-developed. She is not ill-appearing.   HENT:      Head: Normocephalic and atraumatic.      Mouth/Throat:      " Mouth: Mucous membranes are moist.      Pharynx: Oropharynx is clear.   Eyes:      Conjunctiva/sclera: Conjunctivae normal.   Cardiovascular:      Rate and Rhythm: Normal rate.   Pulmonary:      Effort: Pulmonary effort is normal.      Breath sounds: No wheezing or rales.   Musculoskeletal:         General: No swelling.      Cervical back: Normal range of motion.   Skin:     General: Skin is warm and dry.   Neurological:      Mental Status: She is alert. Mental status is at baseline.      Comments: She is alert. Not oriented to day, month , or year.    Psychiatric:         Behavior: Behavior is cooperative.     Able to stand and walk without assistance. She did steady herself on the exam table at times while walking in exam room.     Lab Results   Component Value Date    WBC 8.50 04/17/2023    HGB 13.2 04/17/2023    HGB 12.6 10/06/2021    HGB 12.5 10/29/2020    HCT 41.3 04/17/2023    MCV 97 04/17/2023    MCV 98 10/06/2021     (H) 10/29/2020     04/17/2023    CHOL 182 04/17/2023    TRIG 102 04/17/2023    HDL 58 04/17/2023    LDLCALC 103.6 04/17/2023    LDLCALC 72.8 10/06/2021    LDLCALC 83.0 10/29/2020    ALT 11 04/17/2023    AST 18 04/17/2023     04/17/2023    K 5.0 04/17/2023    CALCIUM 9.5 04/17/2023     04/17/2023    CO2 25 04/17/2023    BUN 23 04/17/2023    CREATININE 1.0 04/17/2023    CREATININE 0.9 09/26/2022    CREATININE 0.9 10/06/2021    EGFRNORACEVR 56.6 (A) 04/17/2023    EGFRNORACEVR >60.0 09/26/2022    TSH 2.409 04/17/2023    TSH 1.909 10/06/2021    TSH 2.003 10/29/2020    GLU 89 04/17/2023    LTOBPEGF21JC 52 10/29/2020    ECGUYNTQ52EH 44 10/25/2019    CDGTMMAB93QC 47 10/17/2018          The ASCVD Risk score ([place] DK, et al., 2019) failed to calculate for the following reasons:    The 2019 ASCVD risk score is only valid for ages 40 to 79     Assessment:     1. PAF (paroxysmal atrial fibrillation)    2. Anxiety    3. Osteopenia of multiple sites    4. Depression, unspecified  depression type    5. Mixed hyperlipidemia    6. Primary hypertension    7. Memory loss      Plan:   1. PAF (paroxysmal atrial fibrillation)  Overview:  Rate controlled.  Continue recommendations Cardiology.      2. Anxiety  Overview:  Stable.  Continue current medications      3. Osteopenia of multiple sites  Assessment & Plan:  Complete dexa as previously ordered      4. Depression, unspecified depression type  Overview:  Stable.  Continue current medications      5. Mixed hyperlipidemia  Overview:  Stable.  Continue current medications      6. Primary hypertension  Overview:  Stable.  Continue current medications      7. Memory loss  -     Vitamin B12; Future; Expected date: 06/06/2023  -     Folate; Future; Expected date: 06/06/2023  -     Cancel: CT Head Without Contrast; Future; Expected date: 06/06/2023  -     RPR; Future; Expected date: 06/06/2023        There are no Patient Instructions on file for this visit.    Future Appointments   Date Time Provider Department Center   4/16/2024  2:40 PM [facility]       Lab Frequency Next Occurrence   DXA Bone Density Axial Skeleton 1 or more sites Once 04/18/2023       No follow-ups on file.

## 2023-06-06 NOTE — TELEPHONE ENCOUNTER
Informed iris that the paperwork cannot be filled out until the pt is seen. Pt appt is scheduled for today

## 2023-06-07 ENCOUNTER — TELEPHONE (OUTPATIENT)
Dept: INTERNAL MEDICINE | Facility: CLINIC | Age: 83
End: 2023-06-07

## 2023-06-07 DIAGNOSIS — R41.3 MEMORY LOSS: Primary | ICD-10-CM

## 2023-06-07 LAB
FOLATE SERPL-MCNC: 4.5 NG/ML (ref 4–24)
VIT B12 SERPL-MCNC: 266 PG/ML (ref 210–950)

## 2023-06-07 NOTE — TELEPHONE ENCOUNTER
----- Message from Katherine Mccarthy sent at 6/7/2023 10:34 AM CDT -----  Contact: [facility]/son  Patient son is calling to speak with the nurse to inform office to give a  call to son with results from  visit 06/06/2023 at [phone] and need a referral for Neurology.  Thanks  LR

## 2023-06-07 NOTE — TELEPHONE ENCOUNTER
Spoke with patient's son, Dewayne, and informed him that Dr. Gudino placed the referral for neurology and that someone should be contacting them to schedule soon. Also informed him that her lab tests are still pending results. He verbalized understanding.

## 2023-06-08 LAB — RPR SER QL: NORMAL

## 2023-06-30 ENCOUNTER — EXTERNAL CHRONIC CARE MANAGEMENT (OUTPATIENT)
Dept: PRIMARY CARE CLINIC | Facility: CLINIC | Age: 83
End: 2023-06-30
Payer: COMMERCIAL

## 2023-06-30 PROCEDURE — 99490 PR CHRONIC CARE MGMT, 1ST 20 MIN: ICD-10-PCS | Mod: S$GLB,,, | Performed by: INTERNAL MEDICINE

## 2023-06-30 PROCEDURE — 99490 CHRNC CARE MGMT STAFF 1ST 20: CPT | Mod: S$GLB,,, | Performed by: INTERNAL MEDICINE

## 2023-07-13 ENCOUNTER — TELEPHONE (OUTPATIENT)
Dept: INTERNAL MEDICINE | Facility: CLINIC | Age: 83
End: 2023-07-13
Payer: MEDICARE

## 2023-07-13 NOTE — TELEPHONE ENCOUNTER
----- Message from Tato Correa sent at 7/13/2023  3:09 PM CDT -----  Contact: [phone]  Pt is calling requesting medications pt is on/results to be emailed [email or web address]/([email or web address]) . Please call back at [phone] . Thanksdj

## 2023-07-14 DIAGNOSIS — E78.5 HYPERLIPIDEMIA, UNSPECIFIED HYPERLIPIDEMIA TYPE: ICD-10-CM

## 2023-07-14 DIAGNOSIS — I10 PRIMARY HYPERTENSION: ICD-10-CM

## 2023-07-14 DIAGNOSIS — F41.9 ANXIETY: ICD-10-CM

## 2023-07-14 RX ORDER — ESCITALOPRAM OXALATE 20 MG/1
TABLET ORAL
Qty: 90 TABLET | Refills: 3 | Status: SHIPPED | OUTPATIENT
Start: 2023-07-14 | End: 2024-02-15

## 2023-07-14 RX ORDER — RAMIPRIL 2.5 MG/1
2.5 CAPSULE ORAL DAILY
Qty: 90 CAPSULE | Refills: 3 | Status: SHIPPED | OUTPATIENT
Start: 2023-07-14 | End: 2024-01-23

## 2023-07-14 RX ORDER — ATORVASTATIN CALCIUM 10 MG/1
TABLET, FILM COATED ORAL
Qty: 90 TABLET | Refills: 3 | Status: SHIPPED | OUTPATIENT
Start: 2023-07-14 | End: 2024-02-19

## 2023-07-14 NOTE — TELEPHONE ENCOUNTER
Refill Decision Note   Nerissa Tej  is requesting a refill authorization.  Brief Assessment and Rationale for Refill:  Approve     Medication Therapy Plan:         Comments:     Note composed:2:01 AM 07/14/2023

## 2023-07-14 NOTE — TELEPHONE ENCOUNTER
No care due was identified.  [facility] Embedded Care Due Messages. Reference number: [id number].   7/14/2023 12:12:28 AM CDT

## 2023-07-31 ENCOUNTER — EXTERNAL CHRONIC CARE MANAGEMENT (OUTPATIENT)
Dept: PRIMARY CARE CLINIC | Facility: CLINIC | Age: 83
End: 2023-07-31
Payer: COMMERCIAL

## 2023-07-31 PROCEDURE — 99490 CHRNC CARE MGMT STAFF 1ST 20: CPT | Mod: S$GLB,,, | Performed by: INTERNAL MEDICINE

## 2023-07-31 PROCEDURE — 99490 PR CHRONIC CARE MGMT, 1ST 20 MIN: ICD-10-PCS | Mod: S$GLB,,, | Performed by: INTERNAL MEDICINE

## 2023-08-14 ENCOUNTER — PES CALL (OUTPATIENT)
Dept: ADMINISTRATIVE | Facility: CLINIC | Age: 83
End: 2023-08-14
Payer: MEDICARE

## 2023-08-31 ENCOUNTER — EXTERNAL CHRONIC CARE MANAGEMENT (OUTPATIENT)
Dept: PRIMARY CARE CLINIC | Facility: CLINIC | Age: 83
End: 2023-08-31
Payer: MEDICARE

## 2023-08-31 PROCEDURE — 99490 CHRNC CARE MGMT STAFF 1ST 20: CPT | Mod: S$PBB,,, | Performed by: INTERNAL MEDICINE

## 2023-08-31 PROCEDURE — 99490 CHRNC CARE MGMT STAFF 1ST 20: CPT | Mod: PBBFAC,25 | Performed by: INTERNAL MEDICINE

## 2023-08-31 PROCEDURE — 99439 PR CHRONIC CARE MGMT, EA ADDTL 20 MIN: ICD-10-PCS | Mod: S$PBB,,, | Performed by: INTERNAL MEDICINE

## 2023-08-31 PROCEDURE — 99490 PR CHRONIC CARE MGMT, 1ST 20 MIN: ICD-10-PCS | Mod: S$PBB,,, | Performed by: INTERNAL MEDICINE

## 2023-08-31 PROCEDURE — 99439 CHRNC CARE MGMT STAF EA ADDL: CPT | Mod: PBBFAC | Performed by: INTERNAL MEDICINE

## 2023-08-31 PROCEDURE — 99439 CHRNC CARE MGMT STAF EA ADDL: CPT | Mod: S$PBB,,, | Performed by: INTERNAL MEDICINE

## 2023-09-11 ENCOUNTER — TELEPHONE (OUTPATIENT)
Dept: INTERNAL MEDICINE | Facility: CLINIC | Age: 83
End: 2023-09-11
Payer: MEDICARE

## 2023-09-11 NOTE — TELEPHONE ENCOUNTER
----- Message from Desean Parmar sent at 9/11/2023  9:38 AM CDT -----  Contact: son  Pt rochelle Buchanan is asking for an return call in reference to having orders placed for an walker and PT due to mom falling please call back at [phone] CJ

## 2023-09-11 NOTE — TELEPHONE ENCOUNTER
Son states that the pt needs a walker due to frequent falls and that the assisted living facility she resides at ([facility]), provides them with the correct orders. Attempted to contact nurse at [facility] regarding orders. tamara

## 2023-09-12 ENCOUNTER — TELEPHONE (OUTPATIENT)
Dept: INTERNAL MEDICINE | Facility: CLINIC | Age: 83
End: 2023-09-12
Payer: MEDICARE

## 2023-09-12 NOTE — TELEPHONE ENCOUNTER
LVM for pt. Son letting him know that we have reached out to the nurse at [facility] but had to leave a vm.

## 2023-09-12 NOTE — TELEPHONE ENCOUNTER
----- Message from Jimmie Gonzales sent at 9/12/2023  1:18 PM CDT -----  Name of Who is Calling: [facility]         What is the request in detail: Called in to inform the office of pt recent fall on Raphael 9/10 and visit to the hospital pt does have bruising and stitches in the face,and son would like to request an order placed for walker and PT for the pt. Please advise       Can the clinic reply by [facility]:NO         [facility]: [place] [phone] [facility] [phone] desk please leave VM

## 2023-09-20 ENCOUNTER — OFFICE VISIT (OUTPATIENT)
Dept: INTERNAL MEDICINE | Facility: CLINIC | Age: 83
End: 2023-09-20
Payer: MEDICARE

## 2023-09-20 VITALS
SYSTOLIC BLOOD PRESSURE: 122 MMHG | OXYGEN SATURATION: 97 % | HEART RATE: 63 BPM | HEIGHT: 65 IN | DIASTOLIC BLOOD PRESSURE: 58 MMHG | WEIGHT: 140 LBS | BODY MASS INDEX: 23.32 KG/M2 | TEMPERATURE: 97 F

## 2023-09-20 DIAGNOSIS — Z48.02 VISIT FOR SUTURE REMOVAL: Primary | ICD-10-CM

## 2023-09-20 PROCEDURE — 99214 OFFICE O/P EST MOD 30 MIN: CPT | Mod: PBBFAC | Performed by: NURSE PRACTITIONER

## 2023-09-20 PROCEDURE — 99213 PR OFFICE/OUTPT VISIT, EST, LEVL III, 20-29 MIN: ICD-10-PCS | Mod: S$PBB,,, | Performed by: NURSE PRACTITIONER

## 2023-09-20 PROCEDURE — 99999 PR PBB SHADOW E&M-EST. PATIENT-LVL IV: ICD-10-PCS | Mod: PBBFAC,,, | Performed by: NURSE PRACTITIONER

## 2023-09-20 PROCEDURE — 99213 OFFICE O/P EST LOW 20 MIN: CPT | Mod: S$PBB,,, | Performed by: NURSE PRACTITIONER

## 2023-09-20 PROCEDURE — 99999 PR PBB SHADOW E&M-EST. PATIENT-LVL IV: CPT | Mod: PBBFAC,,, | Performed by: NURSE PRACTITIONER

## 2023-09-20 RX ORDER — MEMANTINE HYDROCHLORIDE 5 MG/1
5 TABLET ORAL 2 TIMES DAILY
COMMUNITY
Start: 2023-09-06

## 2023-09-20 NOTE — PROGRESS NOTES
Subjective:       Patient ID: Nerissa Burnham is a 83 y.o. female.    Chief Complaint: Suture / Staple Removal (Fall on 9/9/23)    Suture / Staple Removal        Recent ER visit secondary to fall (OLOL- chart reviewed) she is here for removal of sutures from left brow. She denies any s/s of infection/pain.  Her son accompanies her    Past Medical History:   Diagnosis Date    Depression     Encounter for blood transfusion     Hyperlipidemia     Hypertension      Past Surgical History:   Procedure Laterality Date    AUGMENTATION OF BREAST      BREAST SURGERY Bilateral     Augmentation    COLONOSCOPY N/A 1/10/2020    Procedure: COLONOSCOPY;  Surgeon: Donna Pruitt MD;  Location: [facility] ENDO;  Service: Endoscopy;  Laterality: N/A;    HYSTERECTOMY      OOPHORECTOMY       Social History     Socioeconomic History    Marital status:    Tobacco Use    Smoking status: Never    Smokeless tobacco: Never   Substance and Sexual Activity    Alcohol use: Yes     Comment: socially    Drug use: No   Social History Narrative    No smokers in household, 1 dog.     Social Determinants of Health     Financial Resource Strain: Low Risk  (4/24/2020)    Overall Financial Resource Strain (CARDIA)     Difficulty of Paying Living Expenses: Not hard at all   Food Insecurity: No Food Insecurity (4/24/2020)    Hunger Vital Sign     Worried About Running Out of Food in the Last Year: Never true     Ran Out of Food in the Last Year: Never true   Transportation Needs: No Transportation Needs (4/24/2020)    PRAPARE - Transportation     Lack of Transportation (Medical): No     Lack of Transportation (Non-Medical): No   Stress: No Stress Concern Present (4/24/2020)    [employer] [employer] - Occupational Stress Questionnaire     Feeling of Stress : Not at all   Social Connections: Unknown (4/24/2020)    Social Connection and Isolation Panel [NHANES]     Frequency of Communication with Friends and Family: Once a week      Frequency of Social Gatherings with Friends and Family: Twice a week     Active Member of Clubs or Organizations: No     Attends Club or Organization Meetings: Never     Marital Status:      Review of patient's allergies indicates:  No Known Allergies  Current Outpatient Medications   Medication Sig    ascorbic acid, vitamin C, (VITAMIN C) 1000 MG tablet Take 1,000 mg by mouth once daily.    atorvastatin (LIPITOR) 10 MG tablet TAKE 1 TABLET BY MOUTH EVERY DAY    b complex vitamins capsule Take 1 capsule by mouth once daily.    calcium carbonate (CALCIUM 600 ORAL) Take 1,200 mg by mouth once daily.    EScitalopram oxalate (LEXAPRO) 20 MG tablet TAKE 1 TABLET BY MOUTH EVERY DAY    memantine (NAMENDA) 5 MG Tab Take 5 mg by mouth 2 (two) times daily.    metoprolol succinate (TOPROL-XL) 25 MG 24 hr tablet Take 1 tablet (25 mg total) by mouth once daily.    prednisoLONE acetate (PRED FORTE) 1 % DrpS 2 (two) times a day.    ramipriL (ALTACE) 2.5 MG capsule TAKE 1 CAPSULE (2.5 MG TOTAL) BY MOUTH ONCE DAILY.    rivaroxaban (XARELTO) 20 mg Tab Take 1 tablet (20 mg total) by mouth once daily.    valACYclovir (VALTREX) 500 MG tablet Take 1 tablet (500 mg total) by mouth once daily.     No current facility-administered medications for this visit.           Review of Systems   Constitutional:  Negative for activity change, appetite change, chills, diaphoresis, fatigue, fever and unexpected weight change.   HENT:  Negative for congestion, ear pain, postnasal drip, rhinorrhea, sinus pressure, sinus pain, sneezing, sore throat, tinnitus, trouble swallowing and voice change.    Eyes:  Negative for photophobia, pain and visual disturbance.   Respiratory:  Negative for cough, chest tightness, shortness of breath and wheezing.    Cardiovascular:  Negative for chest pain, palpitations and leg swelling.   Gastrointestinal:  Negative for abdominal distention, abdominal pain, constipation, diarrhea, nausea and vomiting.    Genitourinary:  Negative for decreased urine volume, difficulty urinating, dysuria, flank pain, frequency, hematuria and urgency.   Musculoskeletal:  Negative for arthralgias, back pain, joint swelling, neck pain and neck stiffness.   Skin:  Positive for wound.   Allergic/Immunologic: Negative for immunocompromised state.   Neurological:  Negative for dizziness, tremors, seizures, syncope, facial asymmetry, speech difficulty, weakness, light-headedness, numbness and headaches.   Hematological:  Negative for adenopathy. Does not bruise/bleed easily.   Psychiatric/Behavioral:  Negative for confusion and sleep disturbance.        Objective:      Physical Exam  Vitals reviewed.   Skin:     Comments: Sutures removed from L brow x 3 .    Neurological:      Mental Status: She is alert.         Assessment:     Vitals:    09/20/23 0954   BP: (!) 122/58   Pulse: 63   Temp: 97.2 °F (36.2 °C)         1. Visit for suture removal        Plan:   Visit for suture removal      Pt tolerated with no issues

## 2023-09-30 ENCOUNTER — EXTERNAL CHRONIC CARE MANAGEMENT (OUTPATIENT)
Dept: PRIMARY CARE CLINIC | Facility: CLINIC | Age: 83
End: 2023-09-30
Payer: MEDICARE

## 2023-09-30 PROCEDURE — 99490 CHRNC CARE MGMT STAFF 1ST 20: CPT | Mod: PBBFAC | Performed by: INTERNAL MEDICINE

## 2023-09-30 PROCEDURE — 99490 PR CHRONIC CARE MGMT, 1ST 20 MIN: ICD-10-PCS | Mod: S$PBB,,, | Performed by: INTERNAL MEDICINE

## 2023-09-30 PROCEDURE — 99490 CHRNC CARE MGMT STAFF 1ST 20: CPT | Mod: S$PBB,,, | Performed by: INTERNAL MEDICINE

## 2023-10-31 ENCOUNTER — EXTERNAL CHRONIC CARE MANAGEMENT (OUTPATIENT)
Dept: PRIMARY CARE CLINIC | Facility: CLINIC | Age: 83
End: 2023-10-31
Payer: MEDICARE

## 2023-10-31 PROCEDURE — 99490 PR CHRONIC CARE MGMT, 1ST 20 MIN: ICD-10-PCS | Mod: S$PBB,,, | Performed by: INTERNAL MEDICINE

## 2023-10-31 PROCEDURE — 99490 CHRNC CARE MGMT STAFF 1ST 20: CPT | Mod: S$PBB,,, | Performed by: INTERNAL MEDICINE

## 2023-10-31 PROCEDURE — 99490 CHRNC CARE MGMT STAFF 1ST 20: CPT | Mod: PBBFAC | Performed by: INTERNAL MEDICINE

## 2023-11-15 ENCOUNTER — TELEPHONE (OUTPATIENT)
Dept: INTERNAL MEDICINE | Facility: CLINIC | Age: 83
End: 2023-11-15
Payer: MEDICARE

## 2023-11-15 DIAGNOSIS — R19.7 DIARRHEA, UNSPECIFIED TYPE: ICD-10-CM

## 2023-11-15 DIAGNOSIS — F32.A DEPRESSION, UNSPECIFIED DEPRESSION TYPE: Primary | ICD-10-CM

## 2023-11-15 NOTE — TELEPHONE ENCOUNTER
----- Message from Yessica Porras sent at 11/15/2023  1:13 PM CST -----  Pt's son stated his mother is experiencing diarrhea and would like a referral to gastro. Call Sunny back at [phone]. Thx.EL

## 2023-11-15 NOTE — TELEPHONE ENCOUNTER
----- Message from Lena Owen MA sent at 11/15/2023  1:18 PM CST -----  Would you like for her to schedule an appt first before you give her the referral?  ----- Message -----  From: Yessica Porras  Sent: 11/15/2023   1:15 PM CST  To: Shahab You Staff    Pt's son stated his mother is experiencing diarrhea and would like a referral to gastro. Call Sunny mari at [phone]. Thx.EL

## 2023-11-17 ENCOUNTER — TELEPHONE (OUTPATIENT)
Dept: INTERNAL MEDICINE | Facility: CLINIC | Age: 83
End: 2023-11-17
Payer: MEDICARE

## 2023-11-17 NOTE — TELEPHONE ENCOUNTER
Was attempting to call the pt to inform her that she has an appt with gastro in May and that she is on the waiting list for a sooner appt if one become available.

## 2023-11-17 NOTE — TELEPHONE ENCOUNTER
----- Message from Kate Myers sent at 11/17/2023  2:54 PM CST -----  Contact: Dewayne/Son  Patient is calling to speak with a nurse regarding referral. Patient reports not yet receiving a response regarding external referral to and request assistance. Please give patient a call back at [phone] when possible.  Thank you,  GH

## 2023-11-24 DIAGNOSIS — I10 ESSENTIAL HYPERTENSION: ICD-10-CM

## 2023-11-24 RX ORDER — METOPROLOL SUCCINATE 25 MG/1
TABLET, EXTENDED RELEASE ORAL
Qty: 30 TABLET | Refills: 11 | OUTPATIENT
Start: 2023-11-24

## 2023-12-04 DIAGNOSIS — I10 ESSENTIAL HYPERTENSION: ICD-10-CM

## 2023-12-04 RX ORDER — METOPROLOL SUCCINATE 25 MG/1
TABLET, EXTENDED RELEASE ORAL
Qty: 30 TABLET | Refills: 11 | OUTPATIENT
Start: 2023-12-04

## 2023-12-04 RX ORDER — METOPROLOL SUCCINATE 25 MG/1
25 TABLET, EXTENDED RELEASE ORAL DAILY
Qty: 30 TABLET | Refills: 1 | Status: SHIPPED | OUTPATIENT
Start: 2023-12-04 | End: 2024-02-11

## 2023-12-04 NOTE — TELEPHONE ENCOUNTER
----- Message from Geneva Holguin sent at 12/4/2023 10:22 AM CST -----  Type:  RX Refill Request    Who Called: pt son  Refill or New Rx:refill  RX Name and Strength:metoprolol succinate (TOPROL-XL) 25 MG 24 hr tablet  How is the patient currently taking it? (ex. 1XDay):Take 1 tablet (25 mg total) by mouth once daily.  Is this a 30 day or 90 day RX:90  Preferred Pharmacy with phone number:[facility], [address] [address]  Phone: [phone] Fax: [phone]    Mail order   Would the patient rather a call back or a response via [facility]? call  Best Call Back Number:[phone]  Additional Information:

## 2024-01-12 DIAGNOSIS — B00.9 HSV-2 INFECTION: ICD-10-CM

## 2024-01-12 NOTE — TELEPHONE ENCOUNTER
No care due was identified.  Health [facility] Embedded Care Due Messages. Reference number: [id number].   1/12/2024 2:08:45 PM CST

## 2024-01-14 RX ORDER — VALACYCLOVIR HYDROCHLORIDE 500 MG/1
TABLET, FILM COATED ORAL
Qty: 90 TABLET | Refills: 0 | Status: SHIPPED | OUTPATIENT
Start: 2024-01-14

## 2024-01-14 NOTE — TELEPHONE ENCOUNTER
Refill Decision Note   Nerissa Tej  is requesting a refill authorization.  Brief Assessment and Rationale for Refill:  Approve     Medication Therapy Plan:         Comments:     Note composed:2:49 AM 01/14/2024

## 2024-01-22 DIAGNOSIS — I10 PRIMARY HYPERTENSION: ICD-10-CM

## 2024-01-22 NOTE — TELEPHONE ENCOUNTER
Care Due:                  Date            Visit Type   Department     Provider  --------------------------------------------------------------------------------                                EP -                              PRIMARY      [facility] INTERNAL  Last Visit: 06-      CARE ([facility])   ALIA Gudino                              EP -                              PRIMARY      [facility] INTERNAL  Next Visit: 04-      CARE ([facility])   ALIA Gudino                                                            Last  Test          Frequency    Reason                     Performed    Due Date  --------------------------------------------------------------------------------    CBC.........  12 months..  valACYclovir.............  04- 04-    CMP.........  12 months..  atorvastatin, ramipriL,    04- 04-                             valACYclovir.............    Lipid Panel.  12 months..  atorvastatin.............  04- 04-    Health [facility] Embedded Care Due Messages. Reference number: [id number].   1/22/2024 12:09:14 PM CST

## 2024-01-23 DIAGNOSIS — I10 PRIMARY HYPERTENSION: Primary | ICD-10-CM

## 2024-01-23 DIAGNOSIS — I48.0 PAF (PAROXYSMAL ATRIAL FIBRILLATION): ICD-10-CM

## 2024-01-23 RX ORDER — RAMIPRIL 2.5 MG/1
2.5 CAPSULE ORAL DAILY
Qty: 90 CAPSULE | Refills: 0 | Status: SHIPPED | OUTPATIENT
Start: 2024-01-23 | End: 2024-02-23

## 2024-01-23 NOTE — TELEPHONE ENCOUNTER
Provider Staff:  Action required for this patient     Please see care gap opportunities below in Care Due Message.    Thanks!  [facility]     Appointments      Date Provider   Last Visit   6/6/2023 Avelino Gudino MD   Next Visit   4/16/2024 Avelino Gudino MD      Refill Decision Note   Nerissa Burnham  is requesting a refill authorization.  Brief Assessment and Rationale for Refill:  Approve     Medication Therapy Plan:  CBC, CMP, Lipid panel due 4.12.2024      Pharmacist review requested: Yes   Extended chart review required: Yes   Comments:     Note composed:3:00 AM 01/23/2024

## 2024-01-23 NOTE — TELEPHONE ENCOUNTER
Refill Routing Note   Medication(s) are not appropriate for processing by [facility] for the following reason(s):        Drug-disease interaction:  ramipriL and Diarrhea     [facility] action(s):  Defer     Requires labs : Yes    Medication Therapy Plan:  CBC, CMP, Lipid panel due 4.12.2024    Pharmacist review requested: Yes     Appointments  past 12m or future 3m with PCP    Date Provider   Last Visit   6/6/2023 Avelino Gudino MD   Next Visit   4/16/2024 Avelino Gudino MD   ED visits in past 90 days: 0        Note composed:2:56 AM 01/23/2024

## 2024-01-30 ENCOUNTER — OFFICE VISIT (OUTPATIENT)
Dept: CARDIOLOGY | Facility: CLINIC | Age: 84
End: 2024-01-30
Payer: MEDICARE

## 2024-01-30 ENCOUNTER — HOSPITAL ENCOUNTER (OUTPATIENT)
Dept: CARDIOLOGY | Facility: HOSPITAL | Age: 84
Discharge: HOME | End: 2024-01-30
Attending: INTERNAL MEDICINE
Payer: MEDICARE

## 2024-01-30 VITALS
BODY MASS INDEX: 23.52 KG/M2 | HEART RATE: 70 BPM | SYSTOLIC BLOOD PRESSURE: 126 MMHG | WEIGHT: 141.13 LBS | DIASTOLIC BLOOD PRESSURE: 70 MMHG | HEIGHT: 65 IN | OXYGEN SATURATION: 97 %

## 2024-01-30 DIAGNOSIS — I10 PRIMARY HYPERTENSION: Primary | ICD-10-CM

## 2024-01-30 DIAGNOSIS — I48.0 PAF (PAROXYSMAL ATRIAL FIBRILLATION): ICD-10-CM

## 2024-01-30 DIAGNOSIS — I10 PRIMARY HYPERTENSION: ICD-10-CM

## 2024-01-30 DIAGNOSIS — I35.1 NONRHEUMATIC AORTIC VALVE INSUFFICIENCY: ICD-10-CM

## 2024-01-30 DIAGNOSIS — E78.2 MIXED HYPERLIPIDEMIA: ICD-10-CM

## 2024-01-30 DIAGNOSIS — R00.2 PALPITATION: ICD-10-CM

## 2024-01-30 PROCEDURE — 3074F SYST BP LT 130 MM HG: CPT | Mod: CPTII,S$GLB,, | Performed by: INTERNAL MEDICINE

## 2024-01-30 PROCEDURE — 99214 OFFICE O/P EST MOD 30 MIN: CPT | Mod: S$GLB,,, | Performed by: INTERNAL MEDICINE

## 2024-01-30 PROCEDURE — 3288F FALL RISK ASSESSMENT DOCD: CPT | Mod: CPTII,S$GLB,, | Performed by: INTERNAL MEDICINE

## 2024-01-30 PROCEDURE — 1159F MED LIST DOCD IN RCRD: CPT | Mod: CPTII,S$GLB,, | Performed by: INTERNAL MEDICINE

## 2024-01-30 PROCEDURE — 3078F DIAST BP <80 MM HG: CPT | Mod: CPTII,S$GLB,, | Performed by: INTERNAL MEDICINE

## 2024-01-30 PROCEDURE — 99999 PR PBB SHADOW E&M-EST. PATIENT-LVL III: CPT | Mod: PBBFAC,,, | Performed by: INTERNAL MEDICINE

## 2024-01-30 PROCEDURE — 93010 ELECTROCARDIOGRAM REPORT: CPT | Mod: ,,, | Performed by: INTERNAL MEDICINE

## 2024-01-30 PROCEDURE — 1126F AMNT PAIN NOTED NONE PRSNT: CPT | Mod: CPTII,S$GLB,, | Performed by: INTERNAL MEDICINE

## 2024-01-30 PROCEDURE — 1101F PT FALLS ASSESS-DOCD LE1/YR: CPT | Mod: CPTII,S$GLB,, | Performed by: INTERNAL MEDICINE

## 2024-01-30 PROCEDURE — 1160F RVW MEDS BY RX/DR IN RCRD: CPT | Mod: CPTII,S$GLB,, | Performed by: INTERNAL MEDICINE

## 2024-01-30 PROCEDURE — 93005 ELECTROCARDIOGRAM TRACING: CPT

## 2024-01-30 NOTE — PROGRESS NOTES
Subjective:   Patient ID:  Nerissa Burnham is a 83 y.o. female who presents for follow up of No chief complaint on file.      84 yo female routine visit. Lives at group home  PMH PAF, HTN, HLD and depression.  No smoking.  passed away in . Since then, the palpitation seems worse.    ECHo showed normal EF, mild AI, LAE and garde I DD  holter showed PAF 7% burden in . ToprolXL 25 mg added.  Echo 09/2020 normal biv function and mild AI     visit. palpitation once a month. And could last for 3 hours. No associated dizziness faint and SOB. Lying on left side made it worse. Occurred at night.  Quit alcohol now.  No faint, dizziness and chest pain/dyspnea.  Sometime feels knees imbalanced but no fall. More at night when uses the bathroom.  Today EKG NSR   BP LDL and BS controlled     visit.  Palpitation improved, and worse at night lying on left side, less frequency compared to 6 months ago after quit the alcohol.  No faint dizziness dyspnea and chest pain  No active bleeding and bruise.     visit  Palpitation occasionally, when doing things fast. Go to bde later and may have the palpitation. no dizziness faint SOB, leg swelling.   Work out twice a week at [facility]. No active bleeding. Lives alone.   Mild imbalance     02/2022 visit  Recent muscle pulling on left hip and had a lot of pain. On cane ekg today NSR  BP controlled. No dizziness faint and sob and leg swelling   Med compliance and no active bleeding. No fall    Interval history  Moved to group home in 18 months. Group activity.  No chest pain dizziness palpitation dyspnea orthopnea. No active bleeding  EKG reviewed by myself today reveals NSR nonspecific STT change  2020 echo ef nl LVH and mild AI    .                         Past Medical History:   Diagnosis Date    Depression     Encounter for blood transfusion     Hyperlipidemia     Hypertension        Past Surgical History:   Procedure Laterality Date    AUGMENTATION  OF BREAST      BREAST SURGERY Bilateral     Augmentation    COLONOSCOPY N/A 1/10/2020    Procedure: COLONOSCOPY;  Surgeon: Donna Pruitt MD;  Location: [facility];  Service: Endoscopy;  Laterality: N/A;    HYSTERECTOMY      OOPHORECTOMY         Social History     Tobacco Use    Smoking status: Never    Smokeless tobacco: Never   Substance Use Topics    Alcohol use: Yes     Comment: socially    Drug use: No       Family History   Problem Relation Age of Onset    Cancer Mother         Breast    Breast cancer Mother     No Known Problems Father     Breast cancer Maternal Aunt          Review of Systems   Constitutional: Negative for decreased appetite, diaphoresis, fever, malaise/fatigue and night sweats.   HENT:  Negative for nosebleeds.    Eyes:  Negative for blurred vision and double vision.   Cardiovascular:  Negative for chest pain, claudication, dyspnea on exertion, irregular heartbeat, leg swelling, near-syncope, orthopnea, paroxysmal nocturnal dyspnea and syncope.   Respiratory:  Negative for cough, shortness of breath, sleep disturbances due to breathing, snoring, sputum production and wheezing.    Endocrine: Negative for cold intolerance and polyuria.   Hematologic/Lymphatic: Does not bruise/bleed easily.   Skin:  Negative for rash.   Musculoskeletal:  Negative for back pain, falls, joint pain, joint swelling and neck pain.   Gastrointestinal:  Negative for abdominal pain, heartburn, nausea and vomiting.   Genitourinary:  Negative for dysuria, frequency and hematuria.   Neurological:  Negative for difficulty with concentration, dizziness, focal weakness, headaches, light-headedness, numbness, seizures and weakness.   Psychiatric/Behavioral:  Negative for depression, memory loss and substance abuse. The patient does not have insomnia.    Allergic/Immunologic: Negative for HIV exposure and hives.       Objective:   Physical Exam  HENT:      Head: Normocephalic.   Eyes:      Pupils: Pupils are equal, round,  and reactive to light.   Neck:      Thyroid: No thyromegaly.      Vascular: Normal carotid pulses. No carotid bruit or JVD.   Cardiovascular:      Rate and Rhythm: Normal rate and regular rhythm. No extrasystoles are present.     Chest Wall: PMI is not displaced.      Pulses: Normal pulses.      Heart sounds: Murmur heard.      No gallop. No S3 sounds.      Comments: 1/6 ESM on URSB  Pulmonary:      Effort: No respiratory distress.      Breath sounds: Normal breath sounds. No stridor.   Abdominal:      General: Bowel sounds are normal.      Palpations: Abdomen is soft.      Tenderness: There is no abdominal tenderness. There is no rebound.   Musculoskeletal:         General: Normal range of motion.   Skin:     Findings: No rash.   Neurological:      Mental Status: She is alert and oriented to person, place, and time.   Psychiatric:         Behavior: Behavior normal.         Lab Results   Component Value Date    CHOL 182 04/17/2023    CHOL 151 10/06/2021    CHOL 155 10/29/2020     Lab Results   Component Value Date    HDL 58 04/17/2023    HDL 67 10/06/2021    HDL 58 10/29/2020     Lab Results   Component Value Date    LDLCALC 103.6 04/17/2023    LDLCALC 72.8 10/06/2021    LDLCALC 83.0 10/29/2020     Lab Results   Component Value Date    TRIG 102 04/17/2023    TRIG 56 10/06/2021    TRIG 70 10/29/2020     Lab Results   Component Value Date    CHOLHDL 31.9 04/17/2023    CHOLHDL 44.4 10/06/2021    CHOLHDL 37.4 10/29/2020       Chemistry        Component Value Date/Time     04/17/2023 1631    K 5.0 04/17/2023 1631     04/17/2023 1631    CO2 25 04/17/2023 1631    BUN 23 04/17/2023 1631    CREATININE 1.0 04/17/2023 1631    GLU 89 04/17/2023 1631        Component Value Date/Time    CALCIUM 9.5 04/17/2023 1631    ALKPHOS 49 (L) 04/17/2023 1631    AST 18 04/17/2023 1631    ALT 11 04/17/2023 1631    BILITOT 0.3 04/17/2023 1631    ESTGFRAFRICA >60.0 10/06/2021 1055    EGFRNONAA >60.0 10/06/2021 1055          No  "results found for: "LABA1C", "HGBA1C"  Lab Results   Component Value Date    TSH 2.409 04/17/2023     No results found for: "INR", "PROTIME"  Lab Results   Component Value Date    WBC 8.50 04/17/2023    HGB 13.2 04/17/2023    HCT 41.3 04/17/2023    MCV 97 04/17/2023     04/17/2023     BMP  Sodium   Date Value Ref Range Status   04/17/2023 139 136 - 145 mmol/L Final     Potassium   Date Value Ref Range Status   04/17/2023 5.0 3.5 - 5.1 mmol/L Final     Chloride   Date Value Ref Range Status   04/17/2023 104 95 - 110 mmol/L Final     CO2   Date Value Ref Range Status   04/17/2023 25 23 - 29 mmol/L Final     BUN   Date Value Ref Range Status   04/17/2023 23 8 - 23 mg/dL Final     Creatinine   Date Value Ref Range Status   04/17/2023 1.0 0.5 - 1.4 mg/dL Final     Calcium   Date Value Ref Range Status   04/17/2023 9.5 8.7 - 10.5 mg/dL Final     Anion Gap   Date Value Ref Range Status   04/17/2023 10 8 - 16 mmol/L Final     eGFR if    Date Value Ref Range Status   10/06/2021 >60.0 >60 mL/min/1.73 m^2 Final     eGFR if non    Date Value Ref Range Status   10/06/2021 >60.0 >60 mL/min/1.73 m^2 Final     Comment:     Calculation used to obtain the estimated glomerular filtration  rate (eGFR) is the CKD-EPI equation.        BNP  @LABRCNTIP(BNP,BNPTRIAGEBLO)@  @LABRCNTIP(troponini)@  CrCl cannot be calculated (Patient's most recent lab result is older than the maximum 7 days allowed.).  No results found in the last 24 hours.  No results found in the last 24 hours.  No results found in the last 24 hours.    Assessment:      1. Primary hypertension    2. PAF (paroxysmal atrial fibrillation)    3. Mixed hyperlipidemia    4. Palpitation    5. Nonrheumatic aortic valve insufficiency        Plan:   Contiune xarelto 20 mg dialy torpolXL ramipril and lipitor  Echo for AI  Counseled DASH  Check Lipid profile with PCP in 6 months  Recommend heart-healthy diet, weight control and regular " exercise.  Dian. Risk modification.   I have reviewed all pertinent labs and cardiac studies independently. Plans and recommendations have been formulated under my direct supervision. All questions answered and patient voiced understanding.   If symptoms persist go to the ED  RTC in 6 months

## 2024-02-07 ENCOUNTER — HOSPITAL ENCOUNTER (OUTPATIENT)
Dept: CARDIOLOGY | Facility: HOSPITAL | Age: 84
Discharge: HOME | End: 2024-02-07
Attending: INTERNAL MEDICINE
Payer: MEDICARE

## 2024-02-07 VITALS
HEIGHT: 65 IN | BODY MASS INDEX: 23.49 KG/M2 | SYSTOLIC BLOOD PRESSURE: 126 MMHG | DIASTOLIC BLOOD PRESSURE: 70 MMHG | WEIGHT: 141 LBS

## 2024-02-07 DIAGNOSIS — I35.1 NONRHEUMATIC AORTIC VALVE INSUFFICIENCY: ICD-10-CM

## 2024-02-07 LAB
AORTIC ROOT ANNULUS: 2.19 CM
AV INDEX (PROSTH): 0.88
AV MEAN GRADIENT: 5 MMHG
AV PEAK GRADIENT: 9 MMHG
AV REGURGITATION PRESSURE HALF TIME: 802.54 MS
AV VALVE AREA BY VELOCITY RATIO: 2.63 CM²
AV VALVE AREA: 2.83 CM²
AV VELOCITY RATIO: 0.82
BSA FOR ECHO PROCEDURE: 1.71 M2
CV ECHO LV RWT: 0.46 CM
DOP CALC AO PEAK VEL: 1.51 M/S
DOP CALC AO VTI: 30.9 CM
DOP CALC LVOT AREA: 3.2 CM2
DOP CALC LVOT DIAMETER: 2.02 CM
DOP CALC LVOT PEAK VEL: 1.24 M/S
DOP CALC LVOT STROKE VOLUME: 87.45 CM3
DOP CALC RVOT PEAK VEL: 0.96 M/S
DOP CALC RVOT VTI: 19.2 CM
DOP CALCLVOT PEAK VEL VTI: 27.3 CM
E WAVE DECELERATION TIME: 251.22 MSEC
E/A RATIO: 0.61
E/E' RATIO: 12.33 M/S
ECHO LV POSTERIOR WALL: 0.93 CM (ref 0.6–1.1)
FRACTIONAL SHORTENING: 29 % (ref 28–44)
INTERVENTRICULAR SEPTUM: 1.08 CM (ref 0.6–1.1)
IVRT: 91.34 MSEC
LA MAJOR: 5.41 CM
LA MINOR: 5.05 CM
LA WIDTH: 3.3 CM
LEFT ATRIUM SIZE: 3.67 CM
LEFT ATRIUM VOLUME INDEX MOD: 35.7 ML/M2
LEFT ATRIUM VOLUME INDEX: 31.4 ML/M2
LEFT ATRIUM VOLUME MOD: 60.97 CM3
LEFT ATRIUM VOLUME: 53.78 CM3
LEFT INTERNAL DIMENSION IN SYSTOLE: 2.89 CM (ref 2.1–4)
LEFT VENTRICLE DIASTOLIC VOLUME INDEX: 42.16 ML/M2
LEFT VENTRICLE DIASTOLIC VOLUME: 72.09 ML
LEFT VENTRICLE MASS INDEX: 76 G/M2
LEFT VENTRICLE SYSTOLIC VOLUME INDEX: 18.7 ML/M2
LEFT VENTRICLE SYSTOLIC VOLUME: 31.93 ML
LEFT VENTRICULAR INTERNAL DIMENSION IN DIASTOLE: 4.05 CM (ref 3.5–6)
LEFT VENTRICULAR MASS: 130.49 G
LV LATERAL E/E' RATIO: 12.33 M/S
LV SEPTAL E/E' RATIO: 12.33 M/S
LVOT MG: 3.1 MMHG
LVOT MV: 0.81 CM/S
MV PEAK A VEL: 1.21 M/S
MV PEAK E VEL: 0.74 M/S
PISA AR MAX VEL: 4.24 M/S
PISA TR MAX VEL: 3.1 M/S
PULM VEIN S/D RATIO: 1.68
PV MEAN GRADIENT: 2 MMHG
PV MV: 0.68 M/S
PV PEAK D VEL: 0.34 M/S
PV PEAK GRADIENT: 4 MMHG
PV PEAK S VEL: 0.57 M/S
PV PEAK VELOCITY: 1.01 M/S
RA MAJOR: 4.19 CM
RA PRESSURE ESTIMATED: 3 MMHG
RA WIDTH: 2.83 CM
RIGHT VENTRICULAR END-DIASTOLIC DIMENSION: 2.67 CM
RV TB RVSP: 6 MMHG
SINUS: 2.18 CM
STJ: 2.24 CM
TDI LATERAL: 0.06 M/S
TDI SEPTAL: 0.06 M/S
TDI: 0.06 M/S
TR MAX PG: 38 MMHG
TRICUSPID ANNULAR PLANE SYSTOLIC EXCURSION: 2.14 CM
TV REST PULMONARY ARTERY PRESSURE: 41 MMHG
Z-SCORE OF LEFT VENTRICULAR DIMENSION IN END DIASTOLE: -1.59
Z-SCORE OF LEFT VENTRICULAR DIMENSION IN END SYSTOLE: -0.14

## 2024-02-07 PROCEDURE — 93306 TTE W/DOPPLER COMPLETE: CPT

## 2024-02-07 PROCEDURE — 93306 TTE W/DOPPLER COMPLETE: CPT | Mod: 26,,, | Performed by: INTERNAL MEDICINE

## 2024-02-09 DIAGNOSIS — I10 ESSENTIAL HYPERTENSION: ICD-10-CM

## 2024-02-09 NOTE — TELEPHONE ENCOUNTER
Spoke with pt's son in regards to test results. He verbalized understanding information.               ----- Message from Duke Cueva MD sent at 2/8/2024  9:17 PM CST -----  Echo showed nml EF and mild AI  Continue current Rx.   F/U as scheduled

## 2024-02-09 NOTE — TELEPHONE ENCOUNTER
No care due was identified.  Health [facility] Embedded Care Due Messages. Reference number: [id number].   2/09/2024 12:16:15 PM CST

## 2024-02-09 NOTE — TELEPHONE ENCOUNTER
Refill Routing Note   Medication(s) are not appropriate for processing by [facility] for the following reason(s):        Non-participating provider    [facility] action(s):  Route               Appointments  past 12m or future 3m with PCP    Date Provider   Last Visit   1/30/2024 Duke Cueva MD   Next Visit   7/30/2024 Duke Cueva MD   ED visits in past 90 days: 0        Note composed:1:19 PM 02/09/2024

## 2024-02-11 RX ORDER — METOPROLOL SUCCINATE 25 MG/1
TABLET, EXTENDED RELEASE ORAL
Qty: 30 TABLET | Refills: 11 | Status: SHIPPED | OUTPATIENT
Start: 2024-02-11

## 2024-02-19 DIAGNOSIS — E78.5 HYPERLIPIDEMIA, UNSPECIFIED HYPERLIPIDEMIA TYPE: ICD-10-CM

## 2024-02-19 RX ORDER — ATORVASTATIN CALCIUM 10 MG/1
10 TABLET, FILM COATED ORAL
Qty: 90 TABLET | Refills: 0 | Status: SHIPPED | OUTPATIENT
Start: 2024-02-19 | End: 2024-03-26

## 2024-02-19 NOTE — TELEPHONE ENCOUNTER
Refill Decision Note   Nerissa Burnham  is requesting a refill authorization.  Brief Assessment and Rationale for Refill:  Approve     Medication Therapy Plan:       Medication Reconciliation Completed: No   Comments:     No Care Gaps recommended.     Note composed:11:20 AM 02/19/2024

## 2024-02-19 NOTE — TELEPHONE ENCOUNTER
No care due was identified.  [facility] Embedded Care Due Messages. Reference number: [id number].   2/19/2024 7:38:47 AM CST

## 2024-02-23 DIAGNOSIS — I10 PRIMARY HYPERTENSION: ICD-10-CM

## 2024-02-23 RX ORDER — RAMIPRIL 2.5 MG/1
CAPSULE ORAL
Qty: 90 CAPSULE | Refills: 0 | Status: SHIPPED | OUTPATIENT
Start: 2024-02-23 | End: 2024-03-19

## 2024-02-23 NOTE — TELEPHONE ENCOUNTER
Refill Decision Note   Nerissa Burnham  is requesting a refill authorization.  Brief Assessment and Rationale for Refill:  Approve     Medication Therapy Plan:  Drug-Disease: ramipriL and Diarrhea    Medication Reconciliation Completed: No   Comments:     No Care Gaps recommended.     Note composed:4:58 PM 02/23/2024

## 2024-02-23 NOTE — TELEPHONE ENCOUNTER
Refill Routing Note   Medication(s) are not appropriate for processing by [facility] for the following reason(s):        Drug-disease interaction    [facility] action(s):  Defer        Medication Therapy Plan: Drug-Disease: ramipriL and Diarrhea    Pharmacist review requested: Yes     Appointments  past 12m or future 3m with PCP    Date Provider   Last Visit   6/6/2023 Avelino Gudino MD   Next Visit   4/16/2024 Avelino Gudino MD   ED visits in past 90 days: 0        Note composed:4:46 PM 02/23/2024

## 2024-02-23 NOTE — TELEPHONE ENCOUNTER
No care due was identified.  [facility] Embedded Care Due Messages. Reference number: [id number].   2/23/2024 11:09:58 AM CST

## 2024-03-18 DIAGNOSIS — I10 PRIMARY HYPERTENSION: ICD-10-CM

## 2024-03-18 NOTE — TELEPHONE ENCOUNTER
No care due was identified.  Health [facility] Embedded Care Due Messages. Reference number: [id number].   3/18/2024 2:47:00 PM CDT

## 2024-03-19 RX ORDER — RAMIPRIL 2.5 MG/1
CAPSULE ORAL
Qty: 90 CAPSULE | Refills: 0 | Status: SHIPPED | OUTPATIENT
Start: 2024-03-19 | End: 2024-04-26

## 2024-03-19 NOTE — TELEPHONE ENCOUNTER
Refill Routing Note   Medication(s) are not appropriate for processing by [facility] for the following reason(s):        Drug-disease interaction    [facility] action(s):  Defer      Medication Therapy Plan: Diarrhea    Pharmacist review requested: Yes     Appointments  past 12m or future 3m with PCP    Date Provider   Last Visit   6/6/2023 Avelino Gudino MD   Next Visit   4/16/2024 Avelino Gudino MD   ED visits in past 90 days: 0        Note composed:7:50 AM 03/19/2024

## 2024-03-19 NOTE — TELEPHONE ENCOUNTER
Refill Decision Note   Nerissa Burnham  is requesting a refill authorization.  Brief Assessment and Rationale for Refill:  Approve     Medication Therapy Plan:         Pharmacist review requested: Yes   Comments:     Note composed:8:52 AM 03/19/2024

## 2024-03-25 DIAGNOSIS — E78.5 HYPERLIPIDEMIA, UNSPECIFIED HYPERLIPIDEMIA TYPE: ICD-10-CM

## 2024-03-25 NOTE — TELEPHONE ENCOUNTER
Care Due:                  Date            Visit Type   Department     Provider  --------------------------------------------------------------------------------                                EP -                              PRIMARY      [facility] INTER[facility]  Last Visit: 06-      CARE ([facility])   ALIA Gudino                              EP -                              PRIMARY      [facility] INTER[facility]  Next Visit: 04-      CARE ([facility])   ALIA Gudino                                                            Last  Test          Frequency    Reason                     Performed    Due Date  --------------------------------------------------------------------------------    CBC.........  12 months..  valACYclovir.............  04- 04-    CMP.........  12 months..  atorvastatin, ramipriL,    04- 04-                             valACYclovir.............    Lipid Panel.  12 months..  atorvastatin.............  04- 04-    Health [facility] Embedded Care Due Messages. Reference number: [id number].   3/25/2024 7:59:13 AM CDT

## 2024-03-26 RX ORDER — BLACK COHOSH ROOT 200 MG
CAPSULE ORAL
Qty: 30 TABLET | Refills: 11 | Status: SHIPPED | OUTPATIENT
Start: 2024-03-26

## 2024-03-26 RX ORDER — ATORVASTATIN CALCIUM 10 MG/1
10 TABLET, FILM COATED ORAL DAILY
Qty: 90 TABLET | Refills: 0 | Status: SHIPPED | OUTPATIENT
Start: 2024-03-26 | End: 2024-05-08 | Stop reason: SDUPTHER

## 2024-03-26 NOTE — TELEPHONE ENCOUNTER
Refill Routing Note   Medication(s) are not appropriate for processing by [facility] for the following reason(s):        Outside of protocol    [facility] action(s):  Route  Approve     Requires labs : Yes             Appointments  past 12m or future 3m with PCP    Date Provider   Last Visit   6/6/2023 Avelino Gudino MD   Next Visit   4/16/2024 Avelino Gudino MD   ED visits in past 90 days: 0        Note composed:11:35 AM 03/26/2024

## 2024-04-25 DIAGNOSIS — I10 PRIMARY HYPERTENSION: ICD-10-CM

## 2024-04-25 NOTE — TELEPHONE ENCOUNTER
No care due was identified.  [facility] Embedded Care Due Messages. Reference number: [id number].   4/25/2024 1:01:35 PM CDT

## 2024-04-26 RX ORDER — RAMIPRIL 2.5 MG/1
CAPSULE ORAL
Qty: 90 CAPSULE | Refills: 0 | Status: SHIPPED | OUTPATIENT
Start: 2024-04-26 | End: 2024-05-08 | Stop reason: SDUPTHER

## 2024-04-26 NOTE — TELEPHONE ENCOUNTER
Refill Routing Note   Medication(s) are not appropriate for processing by [facility] for the following reason(s):      Required labs outdated    [facility] action(s):  Defer Care Due:  None identified            Appointments  past 12m or future 3m with PCP    Date Provider   Last Visit   6/6/2023 Avelino Gudino MD   Next Visit   Visit date not found Avelino Gudino MD   ED visits in past 90 days: 0        Note composed:11:50 PM 04/25/2024

## 2024-05-07 ENCOUNTER — TELEPHONE (OUTPATIENT)
Dept: INTERNAL MEDICINE | Facility: CLINIC | Age: 84
End: 2024-05-07
Payer: MEDICARE

## 2024-05-07 NOTE — TELEPHONE ENCOUNTER
----- Message from Scarlet Madrigal sent at 5/6/2024 11:17 AM CDT -----  Contact: pt's son/Sunny Correa is calling in rgd to needing the nurse to call him about the pt.  Please call him back at  [phone]  thanks/pedro

## 2024-05-08 ENCOUNTER — OFFICE VISIT (OUTPATIENT)
Dept: INTERNAL MEDICINE | Facility: CLINIC | Age: 84
End: 2024-05-08
Payer: MEDICARE

## 2024-05-08 VITALS
BODY MASS INDEX: 23.91 KG/M2 | DIASTOLIC BLOOD PRESSURE: 70 MMHG | TEMPERATURE: 98 F | WEIGHT: 143.5 LBS | HEART RATE: 65 BPM | SYSTOLIC BLOOD PRESSURE: 120 MMHG | HEIGHT: 65 IN | OXYGEN SATURATION: 95 %

## 2024-05-08 DIAGNOSIS — M79.641 PAIN IN RIGHT HAND: Primary | ICD-10-CM

## 2024-05-08 DIAGNOSIS — F32.A DEPRESSION, UNSPECIFIED DEPRESSION TYPE: ICD-10-CM

## 2024-05-08 DIAGNOSIS — F41.9 ANXIETY: ICD-10-CM

## 2024-05-08 DIAGNOSIS — Z00.00 ROUTINE GENERAL MEDICAL EXAMINATION AT A HEALTH CARE FACILITY: Primary | ICD-10-CM

## 2024-05-08 DIAGNOSIS — I48.0 PAF (PAROXYSMAL ATRIAL FIBRILLATION): ICD-10-CM

## 2024-05-08 DIAGNOSIS — I10 PRIMARY HYPERTENSION: ICD-10-CM

## 2024-05-08 DIAGNOSIS — M65.30 TRIGGER FINGER OF RIGHT HAND, UNSPECIFIED FINGER: ICD-10-CM

## 2024-05-08 DIAGNOSIS — M85.89 OSTEOPENIA OF MULTIPLE SITES: ICD-10-CM

## 2024-05-08 DIAGNOSIS — E78.2 MIXED HYPERLIPIDEMIA: ICD-10-CM

## 2024-05-08 DIAGNOSIS — E78.5 HYPERLIPIDEMIA, UNSPECIFIED HYPERLIPIDEMIA TYPE: ICD-10-CM

## 2024-05-08 PROCEDURE — 1101F PT FALLS ASSESS-DOCD LE1/YR: CPT | Mod: CPTII,S$GLB,, | Performed by: INTERNAL MEDICINE

## 2024-05-08 PROCEDURE — 99999 PR PBB SHADOW E&M-EST. PATIENT-LVL III: CPT | Mod: PBBFAC,,, | Performed by: INTERNAL MEDICINE

## 2024-05-08 PROCEDURE — 3078F DIAST BP <80 MM HG: CPT | Mod: CPTII,S$GLB,, | Performed by: INTERNAL MEDICINE

## 2024-05-08 PROCEDURE — 3074F SYST BP LT 130 MM HG: CPT | Mod: CPTII,S$GLB,, | Performed by: INTERNAL MEDICINE

## 2024-05-08 PROCEDURE — 1126F AMNT PAIN NOTED NONE PRSNT: CPT | Mod: CPTII,S$GLB,, | Performed by: INTERNAL MEDICINE

## 2024-05-08 PROCEDURE — 3288F FALL RISK ASSESSMENT DOCD: CPT | Mod: CPTII,S$GLB,, | Performed by: INTERNAL MEDICINE

## 2024-05-08 PROCEDURE — 99397 PER PM REEVAL EST PAT 65+ YR: CPT | Mod: S$GLB,,, | Performed by: INTERNAL MEDICINE

## 2024-05-08 RX ORDER — ATORVASTATIN CALCIUM 10 MG/1
10 TABLET, FILM COATED ORAL DAILY
Qty: 90 TABLET | Refills: 3 | Status: SHIPPED | OUTPATIENT
Start: 2024-05-08 | End: 2024-05-23 | Stop reason: SDUPTHER

## 2024-05-08 RX ORDER — LOPERAMIDE HYDROCHLORIDE 2 MG/1
CAPSULE ORAL
COMMUNITY
End: 2024-06-05

## 2024-05-08 RX ORDER — RAMIPRIL 2.5 MG/1
2.5 CAPSULE ORAL DAILY
Qty: 90 CAPSULE | Refills: 3 | Status: SHIPPED | OUTPATIENT
Start: 2024-05-08 | End: 2024-05-23 | Stop reason: SDUPTHER

## 2024-05-08 RX ORDER — ESCITALOPRAM OXALATE 20 MG/1
20 TABLET ORAL DAILY
Qty: 90 TABLET | Refills: 3 | Status: SHIPPED | OUTPATIENT
Start: 2024-05-08 | End: 2024-05-29 | Stop reason: SDUPTHER

## 2024-05-08 NOTE — PROGRESS NOTES
Subjective:      Patient ID: Nerissa Burnham is a 84 y.o. female.    Chief Complaint: Annual Exam    HPI      84 y.o. with  Patient Active Problem List   Diagnosis    Hypertension    Hyperlipidemia    Depression    Osteopenia    HSV-2 infection    Anxiety    GERD (gastroesophageal reflux disease)    Palpitation    PAF (paroxysmal atrial fibrillation)    Diarrhea    Family history of factor V Leiden mutation    Nonrheumatic aortic valve insufficiency    Routine general medical examination at a health care facility     Past Medical History:   Diagnosis Date    Depression     Encounter for blood transfusion     Hyperlipidemia     Hypertension        Here today for annual prev exam.  Compliant with meds without significant side effects. Energy and appetite are good.     Also c/o weeks of unable to extend right ring finger. No trauma.             Past Surgical History:   Procedure Laterality Date    AUGMENTATION OF BREAST      BREAST SURGERY Bilateral     Augmentation    COLONOSCOPY N/A 1/10/2020    Procedure: COLONOSCOPY;  Surgeon: Donna Pruitt MD;  Location: [facility];  Service: Endoscopy;  Laterality: N/A;    HYSTERECTOMY      OOPHORECTOMY       Social History     Socioeconomic History    Marital status:    Tobacco Use    Smoking status: Never    Smokeless tobacco: Never   Substance and Sexual Activity    Alcohol use: Yes     Comment: socially    Drug use: No   Social History Narrative    No smokers in household, 1 dog.     Social Determinants of Health     Financial Resource Strain: Low Risk  (4/24/2020)    Overall Financial Resource Strain (CARDIA)     Difficulty of Paying Living Expenses: Not hard at all   Food Insecurity: No Food Insecurity (4/24/2020)    Hunger Vital Sign     Worried About Running Out of Food in the Last Year: Never true     Ran Out of Food in the Last Year: Never true   Transportation Needs: No Transportation Needs (4/24/2020)    PRAPARE - Transportation     Lack of Transportation  "(Medical): No     Lack of Transportation (Non-Medical): No   Stress: No Stress Concern Present (4/24/2020)    [employer] - Occupational Stress Questionnaire     Feeling of Stress : Not at all     family history includes Breast cancer in her maternal aunt and mother; Cancer in her mother; No Known Problems in her father.  Review of Systems   Constitutional:  Negative for chills and fever.   HENT:  Negative for ear pain and sore throat.    Respiratory:  Negative for cough.    Cardiovascular:  Negative for chest pain.   Gastrointestinal:  Negative for abdominal pain and blood in stool.   Genitourinary:  Negative for dysuria and hematuria.   Neurological:  Negative for seizures and syncope.     Objective:   /70 (BP Location: Left arm, Patient Position: Sitting, BP Method: Small (Manual))   Pulse 65   Temp 98.4 °F (36.9 °C) (Tympanic)   Ht 5' 5" (1.651 m)   Wt 65.1 kg (143 lb 8.3 oz)   SpO2 95%   BMI 23.88 kg/m²     Physical Exam  Constitutional:       General: She is not in acute distress.     Appearance: She is well-developed.   HENT:      Head: Normocephalic and atraumatic.   Eyes:      Extraocular Movements: Extraocular movements intact.   Neck:      Thyroid: No thyromegaly.   Cardiovascular:      Rate and Rhythm: Normal rate and regular rhythm.   Pulmonary:      Breath sounds: Normal breath sounds. No wheezing or rales.   Abdominal:      General: Bowel sounds are normal.      Palpations: Abdomen is soft.      Tenderness: There is no abdominal tenderness.   Musculoskeletal:         General: No swelling.      Cervical back: Neck supple. No rigidity.      Right lower leg: No edema.      Left lower leg: No edema.   Lymphadenopathy:      Cervical: No cervical adenopathy.   Skin:     General: Skin is warm and dry.   Neurological:      Mental Status: She is alert and oriented to person, place, and time.   Psychiatric:         Mood and Affect: Mood normal.         Behavior: Behavior " normal.       Trigger finger noted to right ring finger.  No tenderness.  No redness or warmth or edema.    Lab Results   Component Value Date    WBC 8.50 04/17/2023    HGB 13.2 04/17/2023    HGB 12.6 10/06/2021    HGB 12.5 10/29/2020    HCT 41.3 04/17/2023    MCV 97 04/17/2023    MCV 98 10/06/2021     (H) 10/29/2020     04/17/2023    CHOL 182 04/17/2023    TRIG 102 04/17/2023    HDL 58 04/17/2023    LDLCALC 103.6 04/17/2023    LDLCALC 72.8 10/06/2021    LDLCALC 83.0 10/29/2020    ALT 11 04/17/2023    AST 18 04/17/2023     04/17/2023    K 5.0 04/17/2023    CALCIUM 9.5 04/17/2023     04/17/2023    CO2 25 04/17/2023    BUN 23 04/17/2023    CREATININE 1.0 04/17/2023    CREATININE 0.9 09/26/2022    CREATININE 0.9 10/06/2021    EGFRNORACEVR 56.6 (A) 04/17/2023    EGFRNORACEVR >60.0 09/26/2022    TSH 2.409 04/17/2023    TSH 1.909 10/06/2021    TSH 2.003 10/29/2020    GLU 89 04/17/2023    QPVSZZDL88BH 52 10/29/2020    XOATJOBF80RF 44 10/25/2019    SDAOMUZS29GW 47 10/17/2018          The ASCVD Risk score (Rolando DK, et al., 2019) failed to calculate for the following reasons:    The 2019 ASCVD risk score is only valid for ages 40 to 79     Assessment:     1. Routine general medical examination at a health care facility    2. Primary hypertension    3. Mixed hyperlipidemia    4. Depression, unspecified depression type    5. Anxiety    6. Osteopenia of multiple sites    7. PAF (paroxysmal atrial fibrillation)    8. Hyperlipidemia, unspecified hyperlipidemia type    9. Trigger finger of right hand, unspecified finger      Plan:   1. Routine general medical examination at a health care facility  Overview:  Heart healthy diet, regular exercise, and regular use of sunscreen.   HM reviewed        2. Primary hypertension  Overview:  Stable.  Continue current medications    Orders:  -     ramipriL (ALTACE) 2.5 MG capsule; Take 1 capsule (2.5 mg total) by mouth once daily.  Dispense: 90 capsule; Refill:  3    3. Mixed hyperlipidemia  Overview:  Stable.  Continue current medications    Orders:  -     TSH; Future; Expected date: 05/08/2024  -     Lipid Panel; Future; Expected date: 05/08/2024    4. Depression, unspecified depression type  Overview:  Stable.  Continue current medications      5. Anxiety  Overview:  Stable.  Continue current medications    Orders:  -     EScitalopram oxalate (LEXAPRO) 20 MG tablet; Take 1 tablet (20 mg total) by mouth once daily.  Dispense: 90 tablet; Refill: 3    6. Osteopenia of multiple sites  Overview:  Cont calcium and vitamin D      7. PAF (paroxysmal atrial fibrillation)  Overview:  Rate controlled.  Continue recommendations Cardiology.      8. Hyperlipidemia, unspecified hyperlipidemia type  Overview:  Stable.  Continue current medications    Orders:  -     atorvastatin (LIPITOR) 10 MG tablet; Take 1 tablet (10 mg total) by mouth once daily.  Dispense: 90 tablet; Refill: 3    9. Trigger finger of right hand, unspecified finger  -     Ambulatory referral/consult to Orthopedics; Future; Expected date: 05/15/2024        Patient Instructions   Check with your pharmacy regarding rsv vaccine.      Future Appointments   Date Time Provider Department Center   5/10/2024  8:55 AM LABORATORY, [facility] HG LAB [facility]   7/30/2024  2:40 PM Duke Cueva MD [facility] CARDIO [facility]   5/8/2025 10:00 AM Avelino Gudino MD [facility]       Lab Frequency Next Occurrence   Ambulatory referral/consult to Neurology Once 06/14/2023   Ambulatory referral/consult to Gastroenterology Once 11/22/2023       Follow up in about 1 year (around 5/8/2025), or if symptoms worsen or fail to improve.

## 2024-05-20 DIAGNOSIS — M65.30 TRIGGER FINGER, ACQUIRED: Primary | ICD-10-CM

## 2024-05-21 ENCOUNTER — OFFICE VISIT (OUTPATIENT)
Dept: SPORTS MEDICINE | Facility: CLINIC | Age: 84
End: 2024-05-21
Payer: MEDICARE

## 2024-05-21 ENCOUNTER — HOSPITAL ENCOUNTER (OUTPATIENT)
Dept: RADIOLOGY | Facility: HOSPITAL | Age: 84
Discharge: HOME | End: 2024-05-21
Attending: STUDENT IN AN ORGANIZED HEALTH CARE EDUCATION/TRAINING PROGRAM
Payer: MEDICARE

## 2024-05-21 VITALS — WEIGHT: 143.5 LBS | BODY MASS INDEX: 23.91 KG/M2 | HEIGHT: 65 IN

## 2024-05-21 DIAGNOSIS — M65.30 TRIGGER FINGER, ACQUIRED: ICD-10-CM

## 2024-05-21 DIAGNOSIS — M72.0 DUPUYTREN'S CONTRACTURE OF RIGHT HAND: ICD-10-CM

## 2024-05-21 PROCEDURE — 99999 PR PBB SHADOW E&M-EST. PATIENT-LVL IV: CPT | Mod: PBBFAC,,, | Performed by: STUDENT IN AN ORGANIZED HEALTH CARE EDUCATION/TRAINING PROGRAM

## 2024-05-21 PROCEDURE — 99213 OFFICE O/P EST LOW 20 MIN: CPT | Mod: S$GLB,,, | Performed by: STUDENT IN AN ORGANIZED HEALTH CARE EDUCATION/TRAINING PROGRAM

## 2024-05-21 PROCEDURE — 1126F AMNT PAIN NOTED NONE PRSNT: CPT | Mod: CPTII,S$GLB,, | Performed by: STUDENT IN AN ORGANIZED HEALTH CARE EDUCATION/TRAINING PROGRAM

## 2024-05-21 PROCEDURE — 1100F PTFALLS ASSESS-DOCD GE2>/YR: CPT | Mod: CPTII,S$GLB,, | Performed by: STUDENT IN AN ORGANIZED HEALTH CARE EDUCATION/TRAINING PROGRAM

## 2024-05-21 PROCEDURE — 73130 X-RAY EXAM OF HAND: CPT | Mod: 26,50,, | Performed by: RADIOLOGY

## 2024-05-21 PROCEDURE — 3288F FALL RISK ASSESSMENT DOCD: CPT | Mod: CPTII,S$GLB,, | Performed by: STUDENT IN AN ORGANIZED HEALTH CARE EDUCATION/TRAINING PROGRAM

## 2024-05-21 PROCEDURE — 1159F MED LIST DOCD IN RCRD: CPT | Mod: CPTII,S$GLB,, | Performed by: STUDENT IN AN ORGANIZED HEALTH CARE EDUCATION/TRAINING PROGRAM

## 2024-05-21 PROCEDURE — 73130 X-RAY EXAM OF HAND: CPT | Mod: TC,50,PN

## 2024-05-21 NOTE — PROGRESS NOTES
Patient ID: Nerissa Burnham  YOB: 1940  MRN: [id number]    Chief Complaint: Pain of the Right Hand and trigger finger      Referred By:   Primary care for  right hand contracture    History of Present Illness: Nerissa Burnham is a right-hand dominant 84 y.o. female who presents today with  right hand contracture.      84-year-old female presenting today for right hand contracture.  Most concern over the ring finger of the right hand some of the middle fingers as well as the pinky finger.  Notes this has been slowly occurring over last couple months can not remember any specific injuries falls traumas.  No popping or clicking but a inability to fully extend her fingers specifically at the PIP joints.  Denies any real pain, denies any family history of hand issues either.  No symptoms on the left at this time.    Past Medical History:   Past Medical History:   Diagnosis Date    Depression     Encounter for blood transfusion     Hyperlipidemia     Hypertension      Past Surgical History:   Procedure Laterality Date    AUGMENTATION OF BREAST      BREAST SURGERY Bilateral     Augmentation    COLONOSCOPY N/A 1/10/2020    Procedure: COLONOSCOPY;  Surgeon: Donna Pruitt MD;  Location: [facility];  Service: Endoscopy;  Laterality: N/A;    HYSTERECTOMY      OOPHORECTOMY       Family History   Problem Relation Name Age of Onset    Cancer Mother          Breast    Breast cancer Mother      No Known Problems Father      Breast cancer Maternal Aunt       Social History     Socioeconomic History    Marital status:    Tobacco Use    Smoking status: Never    Smokeless tobacco: Never   Substance and Sexual Activity    Alcohol use: Yes     Comment: socially    Drug use: No   Social History Narrative    No smokers in household, 1 dog.     Social Determinants of Health     Financial Resource Strain: Low Risk  (4/24/2020)    Overall Financial Resource Strain (CARDIA)     Difficulty of Paying Living Expenses:  Not hard at all   Food Insecurity: No Food Insecurity (4/24/2020)    Hunger Vital Sign     Worried About Running Out of Food in the Last Year: Never true     Ran Out of Food in the Last Year: Never true   Transportation Needs: No Transportation Needs (4/24/2020)    PRAPARE - Transportation     Lack of Transportation (Medical): No     Lack of Transportation (Non-Medical): No   Stress: No Stress Concern Present (4/24/2020)    [employer] - Occupational Stress Questionnaire     Feeling of Stress : Not at all     Medication List with Changes/Refills   Current Medications    ATORVASTATIN (LIPITOR) 10 MG TABLET    Take 1 tablet (10 mg total) by mouth once daily.    B COMPLEX VITAMINS CAPSULE    Take 1 capsule by mouth once daily.    CALCIUM CARBONATE (CALCIUM 600 ORAL)    Take 1,200 mg by mouth once daily.    ESCITALOPRAM OXALATE (LEXAPRO) 20 MG TABLET    Take 1 tablet (20 mg total) by mouth once daily.    LOPERAMIDE (IMODIUM) 2 MG CAPSULE    Take by mouth.    MEMANTINE (NAMENDA) 5 MG TAB    Take 5 mg by mouth 2 (two) times daily.    METOPROLOL SUCCINATE (TOPROL-XL) 25 MG 24 HR TABLET    GIVE 1 TABLET BY MOUTH ONCE DAILY    PREDNISOLONE ACETATE (PRED FORTE) 1 % DRPS    2 (two) times a day.    RAMIPRIL (ALTACE) 2.5 MG CAPSULE    Take 1 capsule (2.5 mg total) by mouth once daily.    RIVAROXABAN (XARELTO) 20 MG TAB    Take 1 tablet (20 mg total) by mouth once daily.    VALACYCLOVIR (VALTREX) 500 MG TABLET    GIVE 1 TABLET BY MOUTH ONCE DAILY    VITAMIN C 1000 MG TABLET    GIVE 1 TABLET BY MOUTH EVERY NIGHT AT BEDTIME     Review of patient's allergies indicates:  No Known Allergies    Physical Exam:   Body mass index is 23.88 kg/m².    GENERAL: Well appearing, in no acute distress.  HEAD: Normocephalic and atraumatic.  ENT: External ears and nose grossly normal.  EYES: EOMI bilaterally  PULMONARY: Respirations are grossly even and non-labored.  NEURO: Awake, alert, and oriented x 3.  SKIN: No  obvious rashes appreciated.  PSYCH: Mood & affect are appropriate.    Detailed MSK exam:       Right hand exam contracture positive table top test for almost all the digits of the right hand specifically the ring finger and middle finger with at least 20-25 degrees of contracture noted at the PIP joints.  Positive cords appreciated as well on the palmar aspect that are nonpainful.  She does have some early cord formation on the left hand as well without any obvious contractures at this time neurovascularly intact distally motor function median ulnar radial nerve all intact 2+ radial pulse bilaterally    Imaging:  X-Ray Hand 3 View Bilateral  Narrative: EXAM: XR HAND COMPLETE 3 VIEWS BILATERAL    CLINICAL HISTORY: Trigger finger    FINDINGS:  3 views of each hand.    Right hand: Fingers are held in flexion.  No acute fracture or dislocation.  There appear to be degenerative changes related to the third and fourth distal interphalangeal joints.  Mild degenerative changes of the first carpal metacarpal joint.    Left hand: No acute fracture or dislocation.  Moderate to severe degenerative changes of the first carpal metacarpal joint.  Other joints appear unremarkable.  Impression:  Moderate severe degenerative changes of the first left carpometacarpal joint.    Mild to moderate degenerative changes of the right first carpal metacarpal joint and third and fourth distal interphalangeal joints.    Finalized on: 5/21/2024 9:52 AM By:  Daron Price MD  [facility]# [id number]      2024-05-21 09:54:05.903    [facility]      Relevant imaging results were reviewed and interpreted by me and per my read  as above.  This was discussed with the patient and / or family today.     Assessment:  Nerissa Burnham is a 84 y.o. female  presents today for Dupuytren's contracture of the right hand, we discussed the diagnosis prognosis as well as conservative treatment options moving forward.  We reviewed her x-rays today that does show moderate to severe  arthritis at the 1st CMC joint of the thumb but is not likely contributing to her symptoms today.  We discussed she may be a candidate for his Xiaflex injection and she is open to seeing 1 of our hand surgeons, she has not interested in any surgical intervention.  Follow-up as needed.  Information given today on Dupuytren's.    Dupuytren's contracture of right hand  -     Ambulatory referral/consult to Orthopedics         A copy of today's visit note has been sent to the referring provider.       Gustavo Raines MD    Disclaimer: This note was prepared using a voice recognition system and is likely to have sound alike errors within the text.

## 2024-05-21 NOTE — PATIENT INSTRUCTIONS
Assessment:  Nerissa Burnham is a 84 y.o. female   Chief Complaint   Patient presents with    Right Hand - Pain    trigger finger       Encounter Diagnosis   Name Primary?    Dupuytren's contracture of right hand         Plan:  Follow up with hand surgery    Follow-up: Hand surgery or sooner if there are any problems between now and then.    Thank you for choosing [facility] and Dr. Gustavo Raines for your orthopedic & sports medicine care. It is our goal to provide you with exceptional care that will help keep you healthy, active, and get you back in the game.    Please do not hesitate to reach out to us via email, phone, or MyChart with any questions, concerns, or feedback.    If you felt that you received exemplary care today, please consider leaving us feedback on [employer] at:  [email or web address]    If you are experiencing pain/discomfort ,or have questions after 5pm and would like to be connected to the [facility] on-call team, please call this number and specify which Sports Medicine provider is treating you: [phone]

## 2024-05-23 DIAGNOSIS — E78.5 HYPERLIPIDEMIA, UNSPECIFIED HYPERLIPIDEMIA TYPE: ICD-10-CM

## 2024-05-23 DIAGNOSIS — I10 PRIMARY HYPERTENSION: ICD-10-CM

## 2024-05-23 RX ORDER — RAMIPRIL 2.5 MG/1
2.5 CAPSULE ORAL DAILY
Qty: 90 CAPSULE | Refills: 3 | Status: SHIPPED | OUTPATIENT
Start: 2024-05-23

## 2024-05-23 RX ORDER — ATORVASTATIN CALCIUM 10 MG/1
10 TABLET, FILM COATED ORAL DAILY
Qty: 90 TABLET | Refills: 3 | Status: SHIPPED | OUTPATIENT
Start: 2024-05-23 | End: 2024-06-05

## 2024-05-23 NOTE — TELEPHONE ENCOUNTER
No care due was identified.  [facility] Embedded Care Due Messages. Reference number: [id number].   5/23/2024 3:29:31 PM CDT

## 2024-05-24 ENCOUNTER — TELEPHONE (OUTPATIENT)
Dept: INTERNAL MEDICINE | Facility: CLINIC | Age: 84
End: 2024-05-24
Payer: MEDICARE

## 2024-05-24 NOTE — TELEPHONE ENCOUNTER
----- Message from Yuni Boyer sent at 5/24/2024  3:19 PM CDT -----  Regarding: pt callback  Name of Who is Calling:[facility]         What is the request in detail: Requesting if pt is currently taking Lexapro 20 mg   Pls advise           Can the clinic reply by [facility]: no         What [facility] to [facility] Back [facility] not [facility]:[phone]

## 2024-05-29 ENCOUNTER — TELEPHONE (OUTPATIENT)
Dept: INTERNAL MEDICINE | Facility: CLINIC | Age: 84
End: 2024-05-29
Payer: MEDICARE

## 2024-05-29 DIAGNOSIS — F41.9 ANXIETY: ICD-10-CM

## 2024-05-29 RX ORDER — ESCITALOPRAM OXALATE 20 MG/1
20 TABLET ORAL DAILY
Qty: 90 TABLET | Refills: 3 | Status: SHIPPED | OUTPATIENT
Start: 2024-05-29 | End: 2024-06-05

## 2024-05-29 NOTE — TELEPHONE ENCOUNTER
----- Message from Yusra Montejo sent at 5/28/2024  4:46 PM CDT -----  Regarding: [facility] pharmacy  Type: RX Refill Request    Who Called:  [facility] pharmacy        Have you contacted your pharmacy? yes    Refill or New Rx: refill     RX Name and Strength: Epi pen     Best Call Back Number: [phone]

## 2024-05-29 NOTE — TELEPHONE ENCOUNTER
Care Due:                  Date            Visit Type   Department     Provider  --------------------------------------------------------------------------------                                EP -                              PRIMARY      [facility] INTERNAL  Last Visit: 05-      CARE ([facility])   ALIA Gudino                              EP -                              PRIMARY      [facility] INTERNAL  Next Visit: 05-      CARE ([facility])   ALIA Gudino                                                            Last  Test          Frequency    Reason                     Performed    Due Date  --------------------------------------------------------------------------------    CBC.........  12 months..  valACYclovir.............  04- 04-    CMP.........  12 months..  atorvastatin, ramipriL,    04- 04-                             valACYclovir.............    Lipid Panel.  12 months..  atorvastatin.............  04- 04-    [facility] Embedded Care Due Messages. Reference number: [id number].   5/29/2024 2:42:28 PM CDT

## 2024-05-31 ENCOUNTER — PATIENT OUTREACH (OUTPATIENT)
Dept: ADMINISTRATIVE | Facility: CLINIC | Age: 84
End: 2024-05-31
Payer: MEDICARE

## 2024-05-31 NOTE — PROGRESS NOTES
C3 nurse spoke with Nerissa Burnham ( son)  for a TCC post hospital discharge follow up call. The patient has a scheduled [facility] appointment with Avelino Gudino MD on 06/05/2024 @ 1040.

## 2024-06-05 ENCOUNTER — OFFICE VISIT (OUTPATIENT)
Dept: INTERNAL MEDICINE | Facility: CLINIC | Age: 84
End: 2024-06-05
Payer: MEDICARE

## 2024-06-05 VITALS
WEIGHT: 142.19 LBS | OXYGEN SATURATION: 96 % | HEIGHT: 65 IN | TEMPERATURE: 99 F | DIASTOLIC BLOOD PRESSURE: 60 MMHG | SYSTOLIC BLOOD PRESSURE: 126 MMHG | HEART RATE: 58 BPM | BODY MASS INDEX: 23.69 KG/M2

## 2024-06-05 DIAGNOSIS — I10 PRIMARY HYPERTENSION: Primary | ICD-10-CM

## 2024-06-05 DIAGNOSIS — R19.7 DIARRHEA, UNSPECIFIED TYPE: ICD-10-CM

## 2024-06-05 DIAGNOSIS — I70.0 AORTIC ATHEROSCLEROSIS: ICD-10-CM

## 2024-06-05 DIAGNOSIS — E86.0 DEHYDRATION: ICD-10-CM

## 2024-06-05 PROCEDURE — 3288F FALL RISK ASSESSMENT DOCD: CPT | Mod: CPTII,S$GLB,, | Performed by: INTERNAL MEDICINE

## 2024-06-05 PROCEDURE — 3074F SYST BP LT 130 MM HG: CPT | Mod: CPTII,S$GLB,, | Performed by: INTERNAL MEDICINE

## 2024-06-05 PROCEDURE — 99999 PR PBB SHADOW E&M-EST. PATIENT-LVL IV: CPT | Mod: PBBFAC,,, | Performed by: INTERNAL MEDICINE

## 2024-06-05 PROCEDURE — 3078F DIAST BP <80 MM HG: CPT | Mod: CPTII,S$GLB,, | Performed by: INTERNAL MEDICINE

## 2024-06-05 PROCEDURE — 1100F PTFALLS ASSESS-DOCD GE2>/YR: CPT | Mod: CPTII,S$GLB,, | Performed by: INTERNAL MEDICINE

## 2024-06-05 PROCEDURE — 99213 OFFICE O/P EST LOW 20 MIN: CPT | Mod: S$GLB,,, | Performed by: INTERNAL MEDICINE

## 2024-06-05 PROCEDURE — 1126F AMNT PAIN NOTED NONE PRSNT: CPT | Mod: CPTII,S$GLB,, | Performed by: INTERNAL MEDICINE

## 2024-06-05 PROCEDURE — 1159F MED LIST DOCD IN RCRD: CPT | Mod: CPTII,S$GLB,, | Performed by: INTERNAL MEDICINE

## 2024-06-05 RX ORDER — ESCITALOPRAM OXALATE 20 MG/1
1 TABLET ORAL DAILY
COMMUNITY
Start: 2024-05-28

## 2024-06-05 RX ORDER — BISACODYL 5 MG
TABLET, DELAYED RELEASE (ENTERIC COATED) ORAL
COMMUNITY
Start: 2024-02-01

## 2024-06-05 RX ORDER — ATORVASTATIN CALCIUM 10 MG/1
1 TABLET, FILM COATED ORAL EVERY MORNING
COMMUNITY
Start: 2023-10-23

## 2024-06-05 RX ORDER — IBUPROFEN 100 MG/5ML
1 SUSPENSION, ORAL (FINAL DOSE FORM) ORAL NIGHTLY
COMMUNITY

## 2024-06-05 RX ORDER — LOPERAMIDE HYDROCHLORIDE 2 MG/1
2 CAPSULE ORAL
COMMUNITY
Start: 2024-05-28

## 2024-06-05 NOTE — PROGRESS NOTES
Subjective:      Patient ID: Nerissa Burnham is a 84 y.o. female.    Chief Complaint: Follow-up    HPI    Transitional Care Note    Family and/or Caretaker present at visit?  Yes.  Diagnostic tests reviewed/disposition: No diagnosic tests pending after this hospitalization.  Disease/illness education: hydration   Home health/community services discussion/referrals: Patient does not have home health established from hospital visit.  They do need home health.  If needed, we will set up home health for the patient.   Establishment or re-establishment of referral orders for community resources: No other necessary community resources.   Discussion with other health care providers: No discussion with other health care providers necessary.     Here today for hospital follow-up visit after [facility] and near syncopal episode.  Patient was discharged on May 28th after a 1 day stay.  Blood pressure at assisted living facility reported as 80s to 90s.  Acute kidney injury found on presenting labs.  Creatinine 1.14.  She had orthostatic vital signs.  She received IV fluids and ramipril was initially held.  Creatinine returned to baseline of 0.84.  Her blood pressure improved with IV fluids.   Patient was discharged on her home medications and advised to wear compression stockings and adequate hydration..  Of note patient is also no longer receiving alcoholic beverages at the assisted living site.    Son reported chronic diarrhea to hospital team.  She has had negative workup with GI in the past.  Imodium every other day was advised    Pt reports no diarrhea since d/c. Feeling in usual state of health.       Review of Systems   Constitutional:  Negative for chills and fever.   Respiratory:  Negative for cough.    Cardiovascular:  Negative for chest pain.   Gastrointestinal:  Negative for abdominal pain.     Objective:   /60 (BP Location: Left arm, Patient Position: Sitting, BP  "Method: Medium (Manual))   Pulse (!) 58   Temp 98.6 °F (37 °C) (Tympanic)   Ht 5' 5" (1.651 m)   Wt 64.5 kg (142 lb 3.2 oz)   SpO2 96%   BMI 23.66 kg/m²     Physical Exam  Constitutional:       General: She is not in acute distress.     Appearance: She is well-developed. She is not ill-appearing.   Cardiovascular:      Rate and Rhythm: Normal rate.   Pulmonary:      Effort: Pulmonary effort is normal.   Skin:     General: Skin is warm and dry.   Neurological:      Mental Status: She is alert.   Psychiatric:         Behavior: Behavior normal.         Lab Results   Component Value Date    WBC 8.50 04/17/2023    HGB 13.2 04/17/2023    HGB 12.6 10/06/2021    HGB 12.5 10/29/2020    HCT 41.3 04/17/2023    MCV 97 04/17/2023    MCV 98 10/06/2021     (H) 10/29/2020     04/17/2023    CHOL 182 04/17/2023    TRIG 102 04/17/2023    HDL 58 04/17/2023    LDLCALC 103.6 04/17/2023    LDLCALC 72.8 10/06/2021    LDLCALC 83.0 10/29/2020    ALT 11 04/17/2023    AST 18 04/17/2023     05/28/2024    K 4.3 05/28/2024    CALCIUM 8.7 (L) 05/28/2024     (H) 05/28/2024    CO2 24 05/28/2024    BUN 15 05/28/2024    CREATININE 0.84 05/28/2024    CREATININE 1.0 04/17/2023    CREATININE 0.9 09/26/2022    EGFRNORACEVR 56.6 (A) 04/17/2023    EGFRNORACEVR >60.0 09/26/2022    TSH 2.409 04/17/2023    TSH 1.909 10/06/2021    TSH 2.003 10/29/2020    GLU 89 04/17/2023    QLOBAPCL21ES 52 10/29/2020    RCNTVNHG44LU 44 10/25/2019    VFOGRRWS30EY 47 10/17/2018     (H) 05/27/2024          The ASCVD Risk score (Rolando HAND, et al., 2019) failed to calculate for the following reasons:    The 2019 ASCVD risk score is only valid for ages 40 to 79     Assessment:     1. Primary hypertension    2. Dehydration    3. Diarrhea, unspecified type    4. Aortic atherosclerosis      Plan:   1. Primary hypertension  Overview:  Stable.  Continue current medications      2. Dehydration    3. Diarrhea, unspecified type    4. Aortic " "atherosclerosis  Assessment & Plan:   Date:19-DEC-23; X-Ray Chest 1 View: ".AORTIC ATHEROSCLEROSIS..."           Continue hydration.   Can continue imodium.   Forms completed for facility.   Patient Instructions   Check with your pharmacy regarding rsv vaccine.      Future Appointments   Date Time Provider Department Center   7/11/2024 11:00 AM Scarlet Richard PA-C [facility]   7/30/2024  2:40 PM Duke Cueva MD [facility] CARDIO [facility]   5/8/2025 10:00 AM Avelino Gudino MD [facility]       Lab Frequency Next Occurrence   Ambulatory referral/consult to Neurology Once 06/14/2023   Ambulatory referral/consult to Gastroenterology Once 11/22/2023   TSH Once 05/08/2024   Lipid Panel Once 05/08/2024   X-Ray Hand Complete Right Once 05/08/2024       Follow up if symptoms worsen or fail to improve.             "

## 2024-06-16 PROBLEM — I70.0 AORTIC ATHEROSCLEROSIS: Status: ACTIVE | Noted: 2024-06-16

## 2024-07-05 DIAGNOSIS — B00.9 HSV-2 INFECTION: ICD-10-CM

## 2024-07-05 NOTE — TELEPHONE ENCOUNTER
No care due was identified.  [facility] Embedded Care Due Messages. Reference number: [id number].   7/05/2024 12:20:43 PM CDT

## 2024-07-06 NOTE — TELEPHONE ENCOUNTER
Refill Routing Note   Medication(s) are not appropriate for processing by [facility] for the following reason(s):        Required labs outdated    [facility] action(s):  Defer             Appointments  past 12m or future 3m with PCP    Date Provider   Last Visit   6/5/2024 Avelino Gudino MD   Next Visit   Visit date not found Avelino Gudino MD   ED visits in past 90 days: 0        Note composed:6:19 PM 07/06/2024

## 2024-07-07 RX ORDER — VALACYCLOVIR HYDROCHLORIDE 500 MG/1
TABLET, FILM COATED ORAL
Qty: 90 TABLET | Refills: 0 | Status: SHIPPED | OUTPATIENT
Start: 2024-07-07

## 2024-08-12 PROBLEM — Z00.00 ROUTINE GENERAL MEDICAL EXAMINATION AT A HEALTH CARE FACILITY: Status: RESOLVED | Noted: 2024-05-08 | Resolved: 2024-08-12

## 2024-08-13 DIAGNOSIS — I70.0 AORTIC ATHEROSCLEROSIS: Primary | ICD-10-CM

## 2024-08-13 DIAGNOSIS — I48.0 PAF (PAROXYSMAL ATRIAL FIBRILLATION): ICD-10-CM

## 2024-08-13 RX ORDER — RIVAROXABAN 20 MG/1
TABLET, FILM COATED ORAL
Qty: 30 TABLET | Refills: 11 | Status: SHIPPED | OUTPATIENT
Start: 2024-08-13

## 2024-09-26 ENCOUNTER — TELEPHONE (OUTPATIENT)
Dept: CARDIOLOGY | Facility: CLINIC | Age: 84
End: 2024-09-26
Payer: MEDICARE

## 2024-09-26 ENCOUNTER — OFFICE VISIT (OUTPATIENT)
Dept: CARDIOLOGY | Facility: CLINIC | Age: 84
End: 2024-09-26
Payer: MEDICARE

## 2024-09-26 ENCOUNTER — HOSPITAL ENCOUNTER (OUTPATIENT)
Dept: CARDIOLOGY | Facility: HOSPITAL | Age: 84
Discharge: HOME | End: 2024-09-26
Attending: INTERNAL MEDICINE
Payer: MEDICARE

## 2024-09-26 VITALS
HEART RATE: 58 BPM | OXYGEN SATURATION: 96 % | SYSTOLIC BLOOD PRESSURE: 160 MMHG | HEIGHT: 65 IN | DIASTOLIC BLOOD PRESSURE: 80 MMHG | WEIGHT: 142 LBS | BODY MASS INDEX: 23.66 KG/M2

## 2024-09-26 DIAGNOSIS — I70.0 AORTIC ATHEROSCLEROSIS: ICD-10-CM

## 2024-09-26 DIAGNOSIS — I48.0 PAF (PAROXYSMAL ATRIAL FIBRILLATION): ICD-10-CM

## 2024-09-26 DIAGNOSIS — I10 PRIMARY HYPERTENSION: ICD-10-CM
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
## 2024-09-26 DIAGNOSIS — I35.1 NONRHEUMATIC AORTIC VALVE INSUFFICIENCY: ICD-10-CM

## 2024-09-26 DIAGNOSIS — I70.0 AORTIC ATHEROSCLEROSIS: Primary | ICD-10-CM

## 2024-09-26 DIAGNOSIS — E78.2 MIXED HYPERLIPIDEMIA: ICD-10-CM

## 2024-09-26 DIAGNOSIS — Z83.2 FAMILY HISTORY OF FACTOR V LEIDEN MUTATION: ICD-10-CM
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
## 2024-09-26 PROCEDURE — 3077F SYST BP >= 140 MM HG: CPT | Mod: CPTII,S$GLB,, | Performed by: INTERNAL MEDICINE

## 2024-09-26 PROCEDURE — 1159F MED LIST DOCD IN RCRD: CPT | Mod: CPTII,S$GLB,, | Performed by: INTERNAL MEDICINE

## 2024-09-26 PROCEDURE — 3288F FALL RISK ASSESSMENT DOCD: CPT | Mod: CPTII,S$GLB,, | Performed by: INTERNAL MEDICINE

## 2024-09-26 PROCEDURE — 99999 PR PBB SHADOW E&M-EST. PATIENT-LVL IV: CPT | Mod: PBBFAC,,, | Performed by: INTERNAL MEDICINE

## 2024-09-26 PROCEDURE — 1126F AMNT PAIN NOTED NONE PRSNT: CPT | Mod: CPTII,S$GLB,, | Performed by: INTERNAL MEDICINE

## 2024-09-26 PROCEDURE — 99214 OFFICE O/P EST MOD 30 MIN: CPT | Mod: S$GLB,,, | Performed by: INTERNAL MEDICINE

## 2024-09-26 PROCEDURE — 1160F RVW MEDS BY RX/DR IN RCRD: CPT | Mod: CPTII,S$GLB,, | Performed by: INTERNAL MEDICINE

## 2024-09-26 PROCEDURE — 93010 ELECTROCARDIOGRAM REPORT: CPT | Mod: ,,, | Performed by: INTERNAL MEDICINE

## 2024-09-26 PROCEDURE — 3079F DIAST BP 80-89 MM HG: CPT | Mod: CPTII,S$GLB,, | Performed by: INTERNAL MEDICINE

## 2024-09-26 PROCEDURE — 1101F PT FALLS ASSESS-DOCD LE1/YR: CPT | Mod: CPTII,S$GLB,, | Performed by: INTERNAL MEDICINE

## 2024-09-26 PROCEDURE — 93005 ELECTROCARDIOGRAM TRACING: CPT

## 2024-09-26 NOTE — PROGRESS NOTES
Subjective:   Patient ID:  Nerissa Burnham is a 84 y.o. female who presents for follow up of Follow-up      85 yo female routine visit. Lives at group home  PMH PAF, HTN, HLD and depression.  No smoking.  passed away in . Since then, the palpitation seems worse.    ECHo showed normal EF, mild AI, LAE and garde I DD  holter showed PAF 7% burden in . ToprolXL 25 mg added.  Echo 09/2020 normal biv function and mild AI     visit. palpitation once a month. And could last for 3 hours. No associated dizziness faint and SOB. Lying on left side made it worse. Occurred at night.  Quit alcohol now.  No faint, dizziness and chest pain/dyspnea.  Sometime feels knees imbalanced but no fall. More at night when uses the bathroom.  Today EKG NSR   BP LDL and BS controlled     visit.  Palpitation improved, and worse at night lying on left side, less frequency compared to 6 months ago after quit the alcohol.  No faint dizziness dyspnea and chest pain  No active bleeding and bruise.     visit  Palpitation occasionally, when doing things fast. Go to bde later and may have the palpitation. no dizziness faint SOB, leg swelling.   Work out twice a week at [facility]. No active bleeding. Lives alone.   Mild imbalance     02/2022 visit  Recent muscle pulling on left hip and had a lot of pain. On cane ekg today NSR  BP controlled. No dizziness faint and sob and leg swelling   Med compliance and no active bleeding. No fall    01/24 visit   Moved to group home in 18 months. Group activity.  No chest pain dizziness palpitation dyspnea orthopnea. No active bleeding  EKG reviewed by myself today reveals NSR nonspecific STT change  2020 echo ef nl LVH and mild AI    Interval history  Few falls,. Tripped and lost balance. Walker dependent. Demantia  No chest pain dizziness orthopnea   SOBOE. BP high today. No headache. BP was controlled in the past   No active bleeding. No bruise    .                          Past Medical History:   Diagnosis Date    Depression     Encounter for blood transfusion     Hyperlipidemia     Hypertension        Past Surgical History:   Procedure Laterality Date    AUGMENTATION OF BREAST      BREAST SURGERY Bilateral     Augmentation    COLONOSCOPY N/A 1/10/2020    Procedure: COLONOSCOPY;  Surgeon: Donna Pruitt MD;  Location: [facility];  Service: Endoscopy;  Laterality: N/A;    HYSTERECTOMY      OOPHORECTOMY         Social History     Tobacco Use    Smoking status: Never    Smokeless tobacco: Never   Substance Use Topics    Alcohol use: Yes     Comment: socially    Drug use: No       Family History   Problem Relation Name Age of Onset    Cancer Mother          Breast    Breast cancer Mother      No Known Problems Father      Breast cancer Maternal Aunt           ROS    Objective:   Physical Exam  HENT:      Head: Normocephalic.   Eyes:      Pupils: Pupils are equal, round, and reactive to light.   Neck:      Thyroid: No thyromegaly.      Vascular: Normal carotid pulses. No carotid bruit or JVD.   Cardiovascular:      Rate and Rhythm: Normal rate and regular rhythm. No extrasystoles are present.     Chest Wall: PMI is not displaced.      Pulses: Normal pulses.      Heart sounds: Murmur heard.      No gallop. No S3 sounds.      Comments: 1/6 ESM on URSB  Pulmonary:      Effort: No respiratory distress.      Breath sounds: Normal breath sounds. No stridor.   Abdominal:      General: Bowel sounds are normal.      Palpations: Abdomen is soft.      Tenderness: There is no abdominal tenderness. There is no rebound.   Musculoskeletal:         General: Normal range of motion.   Skin:     Findings: No rash.   Neurological:      Mental Status: She is alert and oriented to person, place, and time.   Psychiatric:         Behavior: Behavior normal.         Lab Results   Component Value Date    CHOL 182 04/17/2023    CHOL 151 10/06/2021    CHOL 155 10/29/2020     Lab Results  "  Component Value Date    HDL 58 04/17/2023    HDL 67 10/06/2021    HDL 58 10/29/2020     Lab Results   Component Value Date    LDLCALC 103.6 04/17/2023    LDLCALC 72.8 10/06/2021    LDLCALC 83.0 10/29/2020     Lab Results   Component Value Date    TRIG 102 04/17/2023    TRIG 56 10/06/2021    TRIG 70 10/29/2020     Lab Results   Component Value Date    CHOLHDL 31.9 04/17/2023    CHOLHDL 44.4 10/06/2021    CHOLHDL 37.4 10/29/2020       Chemistry        Component Value Date/Time     05/28/2024 0612     04/17/2023 1631    K 4.3 05/28/2024 0612    K 5.0 04/17/2023 1631     (H) 05/28/2024 0612     04/17/2023 1631    CO2 24 05/28/2024 0612    CO2 25 04/17/2023 1631    BUN 15 05/28/2024 0612    BUN 23 04/17/2023 1631    CREATININE 0.84 05/28/2024 0612    CREATININE 1.0 04/17/2023 1631    GLU 89 04/17/2023 1631        Component Value Date/Time    CALCIUM 8.7 (L) 05/28/2024 0612    CALCIUM 9.5 04/17/2023 1631    ALKPHOS 49 (L) 04/17/2023 1631    AST 18 04/17/2023 1631    ALT 11 04/17/2023 1631    BILITOT 0.3 04/17/2023 1631    ESTGFRAFRICA 69 05/28/2024 0612    ESTGFRAFRICA >60.0 10/06/2021 1055    EGFRNONAA >60.0 10/06/2021 1055          No results found for: "LABA1C", "HGBA1C"  Lab Results   Component Value Date    TSH 1.240 06/13/2024     No results found for: "INR", "PROTIME"  Lab Results   Component Value Date    WBC 8.50 04/17/2023    HGB 13.2 04/17/2023    HCT 41.3 04/17/2023    MCV 97 04/17/2023     04/17/2023     BMP  Sodium   Date Value Ref Range Status   05/28/2024 140 136 - 145 mmol/L Final   04/17/2023 139 136 - 145 mmol/L Final     Potassium   Date Value Ref Range Status   05/28/2024 4.3 3.5 - 5.1 mmol/L Final   04/17/2023 5.0 3.5 - 5.1 mmol/L Final     Chloride   Date Value Ref Range Status   05/28/2024 112 (H) 100 - 109 mmol/L Final   04/17/2023 104 95 - 110 mmol/L Final     CO2   Date Value Ref Range Status   04/17/2023 25 23 - 29 mmol/L Final     Carbon Dioxide   Date Value " Ref Range Status   05/28/2024 24 22 - 33 mmol/L Final     BUN   Date Value Ref Range Status   04/17/2023 23 8 - 23 mg/dL Final     Blood Urea Nitrogen   Date Value Ref Range Status   05/28/2024 15 5 - 25 mg/dL Final     Creatinine   Date Value Ref Range Status   05/28/2024 0.84 0.55 - 1.02 mg/dL Final   04/17/2023 1.0 0.5 - 1.4 mg/dL Final     Calcium   Date Value Ref Range Status   05/28/2024 8.7 (L) 8.8 - 10.6 mg/dL Final   04/17/2023 9.5 8.7 - 10.5 mg/dL Final     Anion Gap   Date Value Ref Range Status   05/28/2024 4 (L) 8 - 16 mmol/L Final   04/17/2023 10 8 - 16 mmol/L Final     eGFR if    Date Value Ref Range Status   10/06/2021 >60.0 >60 mL/min/1.73 m^2 Final     eGFR    Date Value Ref Range Status   05/28/2024 69 mL/min/1.73mSq Final     Comment:     In accordance with NKF-ASN Task Force recommendation, calculation based on the Chronic Kidney Disease Epidemiology Collaboration (CKD-EPI) equation without adjustment for race. eGFR adjusted for gender and age and calculated in ml/min/1.73mSquared. eGFR cannot be calculated if patient is under 18 years of age.     Reference Range:   >= 60 ml/min/1.73mSquared.     eGFR if non    Date Value Ref Range Status   10/06/2021 >60.0 >60 mL/min/1.73 m^2 Final     Comment:     Calculation used to obtain the estimated glomerular filtration  rate (eGFR) is the CKD-EPI equation.        BNP  @LABRCNTIP(BNP,BNPTRIAGEBLO)@  @LABRCNTIP(troponini)@  CrCl cannot be calculated (Patient's most recent lab result is older than the maximum 7 days allowed.).  No results found in the last 24 hours.  No results found in the last 24 hours.  No results found in the last 24 hours.    Assessment:      1. Aortic atherosclerosis    2. Mixed hyperlipidemia    3. Primary hypertension    4. Nonrheumatic aortic valve insufficiency    5. PAF (paroxysmal atrial fibrillation)    6. Family history of factor V Leiden mutation    7. Fall, sequela         Plan:   Decrease xarelto to 15 mg daily due to recent falls    Continue Lipitor toprolXL remipril for HTN HLD and aortic calcifgcation    Counseled DASH  Check Lipid profile with PCP in 6 months  Recommend heart-healthy diet, weight control   Dian. Risk modification.   I have reviewed all pertinent labs and cardiac studies independently. Plans and recommendations have been formulated under my direct supervision. All questions answered and patient voiced understanding.   If symptoms persist go to the ED  RTC in 6 months

## 2024-09-26 NOTE — TELEPHONE ENCOUNTER
Spoke with nurse Descanso in regards to rescheduling appt. Pt was scheduled for appt later today.                 ----- Message from Cheryle Quinones sent at 9/26/2024  1:19 PM CDT -----  Contact: Parul with [facility] phone [phone]  Calling to reschedule the appointments she missed. Please call her. Thanks.

## 2024-09-27 LAB
OHS QRS DURATION: 80 MS
OHS QTC CALCULATION: 409 MS

## 2024-09-30 ENCOUNTER — TELEPHONE (OUTPATIENT)
Dept: CARDIOLOGY | Facility: CLINIC | Age: 84
End: 2024-09-30
Payer: MEDICARE

## 2024-09-30 NOTE — TELEPHONE ENCOUNTER
Please resend xarelto rx.                 ----- Message from Alisha sent at 9/30/2024  2:28 PM CDT -----  Contact: Carly Lee/ [facility]  .Type:  Needs Prescription Transfer     Who Called:  Carly  [facility], [address] [address]  Phone: [phone] Fax: [phone]      Pharmacy name and phone #:     Would the patient rather a call back or a response via [facility]?  Call back   Best Call Back Number:  [phone]    Additional Information:  Carly is calling in regards to the medication rivaroxaban (XARELTO) 15 mg Tab being sent to the wrong pharmacy and needs to get the prescription transferred to [facility]     Thanks

## 2024-09-30 NOTE — TELEPHONE ENCOUNTER
Spoke with pt's son in regards to questions he had about his mom's last office visit.                     ----- Message from Tim Serrano sent at 9/30/2024 10:23 AM CDT -----  Contact: [phone]  Ramses Jt (pt son) called                    Mr pearce is requesting a call back to speak with staff in regards to last office visit appt on 09/26/24.

## 2024-12-04 DIAGNOSIS — Z78.0 MENOPAUSE: ICD-10-CM

## 2024-12-30 DIAGNOSIS — B00.9 HSV-2 INFECTION: ICD-10-CM

## 2024-12-30 NOTE — TELEPHONE ENCOUNTER
Care Due:                  Date            Visit Type   Department     Provider  --------------------------------------------------------------------------------                                EP -                              PRIMARY      [facility] INTERNAL  Last Visit: 06-      CARE ([facility])   ALIA Gudino                              EP -                              PRIMARY      [facility] INTERNAL  Next Visit: 05-      CARE ([facility])   ALIA Gudino                                                            Last  Test          Frequency    Reason                     Performed    Due Date  --------------------------------------------------------------------------------    CBC.........  12 months..  valACYclovir.............  04- 04-    CMP.........  12 months..  ramipriL, valACYclovir...  04- 04-    [facility] Embedded Care Due Messages. Reference number: [id number].   12/30/2024 11:24:57 AM CST

## 2024-12-31 RX ORDER — VALACYCLOVIR HYDROCHLORIDE 500 MG/1
TABLET, FILM COATED ORAL
Qty: 90 TABLET | Refills: 0 | Status: SHIPPED | OUTPATIENT
Start: 2024-12-31

## 2024-12-31 NOTE — TELEPHONE ENCOUNTER
Refill Routing Note   Medication(s) are not appropriate for processing by [facility] for the following reason(s):        Required labs outdated    [facility] action(s):  Defer   Requires labs : Yes      Medication Therapy Plan: FOVS      Appointments  past 12m or future 3m with PCP    Date Provider   Last Visit   6/5/2024 Avelino Gudino MD   Next Visit   5/8/2025 Avelino uGdino MD   ED visits in past 90 days: 0        Note composed:6:18 AM 12/31/2024

## 2025-02-25 DIAGNOSIS — I10 ESSENTIAL HYPERTENSION: ICD-10-CM

## 2025-02-25 RX ORDER — METOPROLOL SUCCINATE 25 MG/1
TABLET, EXTENDED RELEASE ORAL
Qty: 30 TABLET | Refills: 11 | Status: SHIPPED | OUTPATIENT
Start: 2025-02-25

## 2025-03-24 DIAGNOSIS — I48.0 PAF (PAROXYSMAL ATRIAL FIBRILLATION): Primary | ICD-10-CM

## 2025-03-24 DIAGNOSIS — R00.2 PALPITATION: ICD-10-CM

## 2025-06-02 ENCOUNTER — TELEPHONE (OUTPATIENT)
Dept: PHARMACY | Facility: CLINIC | Age: 85
End: 2025-06-02
Payer: MEDICARE

## 2025-08-29 ENCOUNTER — PATIENT MESSAGE (OUTPATIENT)
Dept: ADMINISTRATIVE | Facility: HOSPITAL | Age: 85
End: 2025-08-29
Payer: MEDICARE